# Patient Record
Sex: MALE | HISPANIC OR LATINO | Employment: FULL TIME | URBAN - METROPOLITAN AREA
[De-identification: names, ages, dates, MRNs, and addresses within clinical notes are randomized per-mention and may not be internally consistent; named-entity substitution may affect disease eponyms.]

---

## 2019-01-23 ENCOUNTER — HOSPITAL ENCOUNTER (INPATIENT)
Facility: MEDICAL CENTER | Age: 29
LOS: 2 days | DRG: 247 | End: 2019-01-26
Attending: EMERGENCY MEDICINE | Admitting: INTERNAL MEDICINE
Payer: COMMERCIAL

## 2019-01-23 ENCOUNTER — APPOINTMENT (OUTPATIENT)
Dept: RADIOLOGY | Facility: MEDICAL CENTER | Age: 29
DRG: 247 | End: 2019-01-23
Attending: EMERGENCY MEDICINE
Payer: COMMERCIAL

## 2019-01-23 ENCOUNTER — APPOINTMENT (OUTPATIENT)
Dept: RADIOLOGY | Facility: MEDICAL CENTER | Age: 29
DRG: 247 | End: 2019-01-23
Payer: COMMERCIAL

## 2019-01-23 DIAGNOSIS — R07.9 CHEST PAIN, UNSPECIFIED TYPE: ICD-10-CM

## 2019-01-23 LAB
ALBUMIN SERPL BCP-MCNC: 5.1 G/DL (ref 3.2–4.9)
ALBUMIN/GLOB SERPL: 1.7 G/DL
ALP SERPL-CCNC: 77 U/L (ref 30–99)
ALT SERPL-CCNC: 34 U/L (ref 2–50)
ANION GAP SERPL CALC-SCNC: 12 MMOL/L (ref 0–11.9)
APTT PPP: 31.3 SEC (ref 24.7–36)
AST SERPL-CCNC: 78 U/L (ref 12–45)
BASOPHILS # BLD AUTO: 0.4 % (ref 0–1.8)
BASOPHILS # BLD: 0.04 K/UL (ref 0–0.12)
BILIRUB SERPL-MCNC: 0.5 MG/DL (ref 0.1–1.5)
BNP SERPL-MCNC: 22 PG/ML (ref 0–100)
BUN SERPL-MCNC: 24 MG/DL (ref 8–22)
CALCIUM SERPL-MCNC: 9.7 MG/DL (ref 8.5–10.5)
CHLORIDE SERPL-SCNC: 101 MMOL/L (ref 96–112)
CO2 SERPL-SCNC: 24 MMOL/L (ref 20–33)
CREAT SERPL-MCNC: 1.29 MG/DL (ref 0.5–1.4)
EOSINOPHIL # BLD AUTO: 0.09 K/UL (ref 0–0.51)
EOSINOPHIL NFR BLD: 0.8 % (ref 0–6.9)
ERYTHROCYTE [DISTWIDTH] IN BLOOD BY AUTOMATED COUNT: 38.7 FL (ref 35.9–50)
GLOBULIN SER CALC-MCNC: 3 G/DL (ref 1.9–3.5)
GLUCOSE SERPL-MCNC: 94 MG/DL (ref 65–99)
HCT VFR BLD AUTO: 50.6 % (ref 42–52)
HGB BLD-MCNC: 17.4 G/DL (ref 14–18)
IMM GRANULOCYTES # BLD AUTO: 0.02 K/UL (ref 0–0.11)
IMM GRANULOCYTES NFR BLD AUTO: 0.2 % (ref 0–0.9)
INR PPP: 1.05 (ref 0.87–1.13)
LIPASE SERPL-CCNC: 32 U/L (ref 11–82)
LYMPHOCYTES # BLD AUTO: 1.4 K/UL (ref 1–4.8)
LYMPHOCYTES NFR BLD: 12.7 % (ref 22–41)
MCH RBC QN AUTO: 30.6 PG (ref 27–33)
MCHC RBC AUTO-ENTMCNC: 34.4 G/DL (ref 33.7–35.3)
MCV RBC AUTO: 89.1 FL (ref 81.4–97.8)
MONOCYTES # BLD AUTO: 0.61 K/UL (ref 0–0.85)
MONOCYTES NFR BLD AUTO: 5.5 % (ref 0–13.4)
NEUTROPHILS # BLD AUTO: 8.85 K/UL (ref 1.82–7.42)
NEUTROPHILS NFR BLD: 80.4 % (ref 44–72)
NRBC # BLD AUTO: 0 K/UL
NRBC BLD-RTO: 0 /100 WBC
PLATELET # BLD AUTO: 258 K/UL (ref 164–446)
PMV BLD AUTO: 9.5 FL (ref 9–12.9)
POTASSIUM SERPL-SCNC: 3.8 MMOL/L (ref 3.6–5.5)
PROT SERPL-MCNC: 8.1 G/DL (ref 6–8.2)
PROTHROMBIN TIME: 13.8 SEC (ref 12–14.6)
RBC # BLD AUTO: 5.68 M/UL (ref 4.7–6.1)
SODIUM SERPL-SCNC: 137 MMOL/L (ref 135–145)
TROPONIN I SERPL-MCNC: 6.87 NG/ML (ref 0–0.04)
WBC # BLD AUTO: 11 K/UL (ref 4.8–10.8)

## 2019-01-23 PROCEDURE — 700111 HCHG RX REV CODE 636 W/ 250 OVERRIDE (IP): Performed by: EMERGENCY MEDICINE

## 2019-01-23 PROCEDURE — 93005 ELECTROCARDIOGRAM TRACING: CPT

## 2019-01-23 PROCEDURE — 99291 CRITICAL CARE FIRST HOUR: CPT

## 2019-01-23 PROCEDURE — 83880 ASSAY OF NATRIURETIC PEPTIDE: CPT

## 2019-01-23 PROCEDURE — 84100 ASSAY OF PHOSPHORUS: CPT

## 2019-01-23 PROCEDURE — 93005 ELECTROCARDIOGRAM TRACING: CPT | Performed by: EMERGENCY MEDICINE

## 2019-01-23 PROCEDURE — 85025 COMPLETE CBC W/AUTO DIFF WBC: CPT

## 2019-01-23 PROCEDURE — 71045 X-RAY EXAM CHEST 1 VIEW: CPT

## 2019-01-23 PROCEDURE — 83735 ASSAY OF MAGNESIUM: CPT

## 2019-01-23 PROCEDURE — 96374 THER/PROPH/DIAG INJ IV PUSH: CPT

## 2019-01-23 PROCEDURE — 85610 PROTHROMBIN TIME: CPT

## 2019-01-23 PROCEDURE — 80053 COMPREHEN METABOLIC PANEL: CPT

## 2019-01-23 PROCEDURE — 85730 THROMBOPLASTIN TIME PARTIAL: CPT

## 2019-01-23 PROCEDURE — 84484 ASSAY OF TROPONIN QUANT: CPT | Mod: 91

## 2019-01-23 PROCEDURE — 96375 TX/PRO/DX INJ NEW DRUG ADDON: CPT

## 2019-01-23 PROCEDURE — 83690 ASSAY OF LIPASE: CPT

## 2019-01-23 RX ORDER — COLCHICINE 0.6 MG/1
1.2 TABLET ORAL DAILY
Status: DISCONTINUED | OUTPATIENT
Start: 2019-01-24 | End: 2019-01-23

## 2019-01-23 RX ORDER — MORPHINE SULFATE 4 MG/ML
4 INJECTION, SOLUTION INTRAMUSCULAR; INTRAVENOUS ONCE
Status: COMPLETED | OUTPATIENT
Start: 2019-01-24 | End: 2019-01-23

## 2019-01-23 RX ORDER — ONDANSETRON 2 MG/ML
4 INJECTION INTRAMUSCULAR; INTRAVENOUS ONCE
Status: COMPLETED | OUTPATIENT
Start: 2019-01-24 | End: 2019-01-23

## 2019-01-23 RX ADMIN — ONDANSETRON 4 MG: 2 INJECTION INTRAMUSCULAR; INTRAVENOUS at 23:53

## 2019-01-23 RX ADMIN — MORPHINE SULFATE 4 MG: 4 INJECTION INTRAVENOUS at 23:53

## 2019-01-24 ENCOUNTER — APPOINTMENT (OUTPATIENT)
Dept: CARDIOLOGY | Facility: MEDICAL CENTER | Age: 29
DRG: 247 | End: 2019-01-24
Attending: INTERNAL MEDICINE
Payer: COMMERCIAL

## 2019-01-24 PROBLEM — N17.9 AKI (ACUTE KIDNEY INJURY) (HCC): Status: RESOLVED | Noted: 2019-01-24 | Resolved: 2019-01-24

## 2019-01-24 PROBLEM — R07.9 CHEST PAIN RADIATING TO JAW: Status: ACTIVE | Noted: 2019-01-24

## 2019-01-24 PROBLEM — R74.01 ELEVATED AST (SGOT): Status: ACTIVE | Noted: 2019-01-24

## 2019-01-24 PROBLEM — R79.89 ELEVATED TROPONIN: Status: ACTIVE | Noted: 2019-01-24

## 2019-01-24 PROBLEM — N17.9 AKI (ACUTE KIDNEY INJURY) (HCC): Status: ACTIVE | Noted: 2019-01-24

## 2019-01-24 LAB
ALBUMIN SERPL BCP-MCNC: 4.6 G/DL (ref 3.2–4.9)
ALBUMIN/GLOB SERPL: 1.8 G/DL
ALP SERPL-CCNC: 74 U/L (ref 30–99)
ALT SERPL-CCNC: 42 U/L (ref 2–50)
AMPHET UR QL SCN: NEGATIVE
ANION GAP SERPL CALC-SCNC: 9 MMOL/L (ref 0–11.9)
AST SERPL-CCNC: 144 U/L (ref 12–45)
BARBITURATES UR QL SCN: NEGATIVE
BASOPHILS # BLD AUTO: 0.4 % (ref 0–1.8)
BASOPHILS # BLD: 0.04 K/UL (ref 0–0.12)
BENZODIAZ UR QL SCN: NEGATIVE
BILIRUB SERPL-MCNC: 0.7 MG/DL (ref 0.1–1.5)
BUN SERPL-MCNC: 22 MG/DL (ref 8–22)
BZE UR QL SCN: NEGATIVE
CALCIUM SERPL-MCNC: 9.5 MG/DL (ref 8.5–10.5)
CANNABINOIDS UR QL SCN: POSITIVE
CHLORIDE SERPL-SCNC: 104 MMOL/L (ref 96–112)
CHOLEST SERPL-MCNC: 174 MG/DL (ref 100–199)
CO2 SERPL-SCNC: 25 MMOL/L (ref 20–33)
CREAT SERPL-MCNC: 1.09 MG/DL (ref 0.5–1.4)
CRP SERPL HS-MCNC: 0.68 MG/DL (ref 0–0.75)
CRP SERPL HS-MCNC: 6.8 MG/L (ref 0–7.5)
EKG IMPRESSION: NORMAL
EOSINOPHIL # BLD AUTO: 0.2 K/UL (ref 0–0.51)
EOSINOPHIL NFR BLD: 1.8 % (ref 0–6.9)
ERYTHROCYTE [DISTWIDTH] IN BLOOD BY AUTOMATED COUNT: 39 FL (ref 35.9–50)
ERYTHROCYTE [SEDIMENTATION RATE] IN BLOOD BY WESTERGREN METHOD: 7 MM/HOUR (ref 0–15)
EST. AVERAGE GLUCOSE BLD GHB EST-MCNC: 111 MG/DL
GLOBULIN SER CALC-MCNC: 2.6 G/DL (ref 1.9–3.5)
GLUCOSE SERPL-MCNC: 93 MG/DL (ref 65–99)
HBA1C MFR BLD: 5.5 % (ref 0–5.6)
HCT VFR BLD AUTO: 48 % (ref 42–52)
HDLC SERPL-MCNC: 33 MG/DL
HGB BLD-MCNC: 16.7 G/DL (ref 14–18)
IMM GRANULOCYTES # BLD AUTO: 0.03 K/UL (ref 0–0.11)
IMM GRANULOCYTES NFR BLD AUTO: 0.3 % (ref 0–0.9)
LDLC SERPL CALC-MCNC: 110 MG/DL
LV EJECT FRACT  99904: 45
LV EJECT FRACT MOD 2C 99903: 62.72
LV EJECT FRACT MOD 4C 99902: 55.7
LV EJECT FRACT MOD BP 99901: 55.34
LYMPHOCYTES # BLD AUTO: 2.18 K/UL (ref 1–4.8)
LYMPHOCYTES NFR BLD: 19.9 % (ref 22–41)
MAGNESIUM SERPL-MCNC: 1.9 MG/DL (ref 1.5–2.5)
MAGNESIUM SERPL-MCNC: 2 MG/DL (ref 1.5–2.5)
MCH RBC QN AUTO: 30.8 PG (ref 27–33)
MCHC RBC AUTO-ENTMCNC: 34.8 G/DL (ref 33.7–35.3)
MCV RBC AUTO: 88.6 FL (ref 81.4–97.8)
METHADONE UR QL SCN: NEGATIVE
MONOCYTES # BLD AUTO: 0.95 K/UL (ref 0–0.85)
MONOCYTES NFR BLD AUTO: 8.7 % (ref 0–13.4)
NEUTROPHILS # BLD AUTO: 7.58 K/UL (ref 1.82–7.42)
NEUTROPHILS NFR BLD: 68.9 % (ref 44–72)
NRBC # BLD AUTO: 0 K/UL
NRBC BLD-RTO: 0 /100 WBC
OPIATES UR QL SCN: POSITIVE
OXYCODONE UR QL SCN: NEGATIVE
PCP UR QL SCN: NEGATIVE
PHOSPHATE SERPL-MCNC: 4.4 MG/DL (ref 2.5–4.5)
PLATELET # BLD AUTO: 228 K/UL (ref 164–446)
PMV BLD AUTO: 9.8 FL (ref 9–12.9)
POTASSIUM SERPL-SCNC: 3.9 MMOL/L (ref 3.6–5.5)
PROPOXYPH UR QL SCN: NEGATIVE
PROT SERPL-MCNC: 7.2 G/DL (ref 6–8.2)
RBC # BLD AUTO: 5.42 M/UL (ref 4.7–6.1)
SODIUM SERPL-SCNC: 138 MMOL/L (ref 135–145)
TRIGL SERPL-MCNC: 153 MG/DL (ref 0–149)
TROPONIN I SERPL-MCNC: 16.59 NG/ML (ref 0–0.04)
TROPONIN I SERPL-MCNC: 55.06 NG/ML (ref 0–0.04)
TROPONIN I SERPL-MCNC: 69.19 NG/ML (ref 0–0.04)
TROPONIN I SERPL-MCNC: 73.93 NG/ML (ref 0–0.04)
TSH SERPL DL<=0.005 MIU/L-ACNC: 3.86 UIU/ML (ref 0.38–5.33)
WBC # BLD AUTO: 11 K/UL (ref 4.8–10.8)

## 2019-01-24 PROCEDURE — 83735 ASSAY OF MAGNESIUM: CPT

## 2019-01-24 PROCEDURE — 92928 PRQ TCAT PLMT NTRAC ST 1 LES: CPT | Mod: LD | Performed by: INTERNAL MEDICINE

## 2019-01-24 PROCEDURE — 84443 ASSAY THYROID STIM HORMONE: CPT

## 2019-01-24 PROCEDURE — A9270 NON-COVERED ITEM OR SERVICE: HCPCS | Performed by: INTERNAL MEDICINE

## 2019-01-24 PROCEDURE — C1757 CATH, THROMBECTOMY/EMBOLECT: HCPCS

## 2019-01-24 PROCEDURE — C1769 GUIDE WIRE: HCPCS

## 2019-01-24 PROCEDURE — 700105 HCHG RX REV CODE 258: Performed by: INTERNAL MEDICINE

## 2019-01-24 PROCEDURE — C1874 STENT, COATED/COV W/DEL SYS: HCPCS

## 2019-01-24 PROCEDURE — A9270 NON-COVERED ITEM OR SERVICE: HCPCS | Performed by: STUDENT IN AN ORGANIZED HEALTH CARE EDUCATION/TRAINING PROGRAM

## 2019-01-24 PROCEDURE — 360979 HCHG DIAGNOSTIC CATH

## 2019-01-24 PROCEDURE — 99152 MOD SED SAME PHYS/QHP 5/>YRS: CPT

## 2019-01-24 PROCEDURE — C1894 INTRO/SHEATH, NON-LASER: HCPCS

## 2019-01-24 PROCEDURE — 700111 HCHG RX REV CODE 636 W/ 250 OVERRIDE (IP)

## 2019-01-24 PROCEDURE — 93306 TTE W/DOPPLER COMPLETE: CPT

## 2019-01-24 PROCEDURE — 93005 ELECTROCARDIOGRAM TRACING: CPT | Performed by: INTERNAL MEDICINE

## 2019-01-24 PROCEDURE — 700117 HCHG RX CONTRAST REV CODE 255: Performed by: INTERNAL MEDICINE

## 2019-01-24 PROCEDURE — 02C03ZZ EXTIRPATION OF MATTER FROM CORONARY ARTERY, ONE ARTERY, PERCUTANEOUS APPROACH: ICD-10-PCS | Performed by: INTERNAL MEDICINE

## 2019-01-24 PROCEDURE — 93010 ELECTROCARDIOGRAM REPORT: CPT | Performed by: INTERNAL MEDICINE

## 2019-01-24 PROCEDURE — 02703DZ DILATION OF CORONARY ARTERY, ONE ARTERY WITH INTRALUMINAL DEVICE, PERCUTANEOUS APPROACH: ICD-10-PCS | Performed by: INTERNAL MEDICINE

## 2019-01-24 PROCEDURE — 027034Z DILATION OF CORONARY ARTERY, ONE ARTERY WITH DRUG-ELUTING INTRALUMINAL DEVICE, PERCUTANEOUS APPROACH: ICD-10-PCS | Performed by: INTERNAL MEDICINE

## 2019-01-24 PROCEDURE — 93454 CORONARY ARTERY ANGIO S&I: CPT

## 2019-01-24 PROCEDURE — C1725 CATH, TRANSLUMIN NON-LASER: HCPCS

## 2019-01-24 PROCEDURE — 93454 CORONARY ARTERY ANGIO S&I: CPT | Mod: 26,59 | Performed by: INTERNAL MEDICINE

## 2019-01-24 PROCEDURE — 700102 HCHG RX REV CODE 250 W/ 637 OVERRIDE(OP): Performed by: STUDENT IN AN ORGANIZED HEALTH CARE EDUCATION/TRAINING PROGRAM

## 2019-01-24 PROCEDURE — 83036 HEMOGLOBIN GLYCOSYLATED A1C: CPT

## 2019-01-24 PROCEDURE — 700101 HCHG RX REV CODE 250

## 2019-01-24 PROCEDURE — 80061 LIPID PANEL: CPT

## 2019-01-24 PROCEDURE — 86141 C-REACTIVE PROTEIN HS: CPT

## 2019-01-24 PROCEDURE — 99223 1ST HOSP IP/OBS HIGH 75: CPT | Performed by: INTERNAL MEDICINE

## 2019-01-24 PROCEDURE — 93306 TTE W/DOPPLER COMPLETE: CPT | Mod: 26 | Performed by: INTERNAL MEDICINE

## 2019-01-24 PROCEDURE — 80053 COMPREHEN METABOLIC PANEL: CPT

## 2019-01-24 PROCEDURE — 86140 C-REACTIVE PROTEIN: CPT

## 2019-01-24 PROCEDURE — 93005 ELECTROCARDIOGRAM TRACING: CPT | Performed by: STUDENT IN AN ORGANIZED HEALTH CARE EDUCATION/TRAINING PROGRAM

## 2019-01-24 PROCEDURE — 770020 HCHG ROOM/CARE - TELE (206)

## 2019-01-24 PROCEDURE — 85025 COMPLETE CBC W/AUTO DIFF WBC: CPT

## 2019-01-24 PROCEDURE — B2111ZZ FLUOROSCOPY OF MULTIPLE CORONARY ARTERIES USING LOW OSMOLAR CONTRAST: ICD-10-PCS | Performed by: INTERNAL MEDICINE

## 2019-01-24 PROCEDURE — 80307 DRUG TEST PRSMV CHEM ANLYZR: CPT

## 2019-01-24 PROCEDURE — A9270 NON-COVERED ITEM OR SERVICE: HCPCS

## 2019-01-24 PROCEDURE — 700111 HCHG RX REV CODE 636 W/ 250 OVERRIDE (IP): Performed by: INTERNAL MEDICINE

## 2019-01-24 PROCEDURE — 99152 MOD SED SAME PHYS/QHP 5/>YRS: CPT | Performed by: INTERNAL MEDICINE

## 2019-01-24 PROCEDURE — C9602 PERC D-E COR STENT ATHER S: HCPCS | Mod: LD

## 2019-01-24 PROCEDURE — 99255 IP/OBS CONSLTJ NEW/EST HI 80: CPT | Performed by: INTERNAL MEDICINE

## 2019-01-24 PROCEDURE — 700102 HCHG RX REV CODE 250 W/ 637 OVERRIDE(OP): Performed by: INTERNAL MEDICINE

## 2019-01-24 PROCEDURE — C1760 CLOSURE DEV, VASC: HCPCS

## 2019-01-24 PROCEDURE — 700102 HCHG RX REV CODE 250 W/ 637 OVERRIDE(OP)

## 2019-01-24 PROCEDURE — 85652 RBC SED RATE AUTOMATED: CPT

## 2019-01-24 PROCEDURE — C1887 CATHETER, GUIDING: HCPCS

## 2019-01-24 PROCEDURE — 700105 HCHG RX REV CODE 258: Performed by: STUDENT IN AN ORGANIZED HEALTH CARE EDUCATION/TRAINING PROGRAM

## 2019-01-24 PROCEDURE — 84484 ASSAY OF TROPONIN QUANT: CPT | Mod: 91

## 2019-01-24 PROCEDURE — 304952 HCHG R 2 PADS

## 2019-01-24 PROCEDURE — 99153 MOD SED SAME PHYS/QHP EA: CPT

## 2019-01-24 PROCEDURE — 36415 COLL VENOUS BLD VENIPUNCTURE: CPT

## 2019-01-24 PROCEDURE — C9600 PERC DRUG-EL COR STENT SING: HCPCS | Mod: LD

## 2019-01-24 RX ORDER — NITROGLYCERIN 0.4 MG/1
0.4 TABLET SUBLINGUAL
Status: DISCONTINUED | OUTPATIENT
Start: 2019-01-24 | End: 2019-01-26 | Stop reason: HOSPADM

## 2019-01-24 RX ORDER — BIVALIRUDIN 250 MG/5ML
INJECTION, POWDER, LYOPHILIZED, FOR SOLUTION INTRAVENOUS
Status: COMPLETED
Start: 2019-01-24 | End: 2019-01-24

## 2019-01-24 RX ORDER — ACETAMINOPHEN 325 MG/1
650 TABLET ORAL EVERY 6 HOURS PRN
Status: DISCONTINUED | OUTPATIENT
Start: 2019-01-24 | End: 2019-01-24

## 2019-01-24 RX ORDER — MIDAZOLAM HYDROCHLORIDE 1 MG/ML
INJECTION INTRAMUSCULAR; INTRAVENOUS
Status: COMPLETED
Start: 2019-01-24 | End: 2019-01-24

## 2019-01-24 RX ORDER — ASPIRIN 325 MG
325 TABLET ORAL DAILY
Status: DISCONTINUED | OUTPATIENT
Start: 2019-01-24 | End: 2019-01-24

## 2019-01-24 RX ORDER — HEPARIN SODIUM,PORCINE 1000/ML
VIAL (ML) INJECTION
Status: COMPLETED
Start: 2019-01-24 | End: 2019-01-24

## 2019-01-24 RX ORDER — VERAPAMIL HYDROCHLORIDE 2.5 MG/ML
INJECTION, SOLUTION INTRAVENOUS
Status: COMPLETED
Start: 2019-01-24 | End: 2019-01-24

## 2019-01-24 RX ORDER — COLCHICINE 0.6 MG/1
0.6 TABLET ORAL 2 TIMES DAILY
Status: DISCONTINUED | OUTPATIENT
Start: 2019-01-24 | End: 2019-01-25

## 2019-01-24 RX ORDER — ATORVASTATIN CALCIUM 40 MG/1
40 TABLET, FILM COATED ORAL
Status: DISCONTINUED | OUTPATIENT
Start: 2019-01-24 | End: 2019-01-24

## 2019-01-24 RX ORDER — CARVEDILOL 3.12 MG/1
3.12 TABLET ORAL 2 TIMES DAILY WITH MEALS
Status: DISCONTINUED | OUTPATIENT
Start: 2019-01-24 | End: 2019-01-24

## 2019-01-24 RX ORDER — LIDOCAINE HYDROCHLORIDE 20 MG/ML
INJECTION, SOLUTION INFILTRATION; PERINEURAL
Status: COMPLETED
Start: 2019-01-24 | End: 2019-01-24

## 2019-01-24 RX ORDER — ACETAMINOPHEN 325 MG/1
650 TABLET ORAL EVERY 6 HOURS PRN
Status: DISCONTINUED | OUTPATIENT
Start: 2019-01-24 | End: 2019-01-26 | Stop reason: HOSPADM

## 2019-01-24 RX ORDER — AMOXICILLIN 250 MG
2 CAPSULE ORAL 2 TIMES DAILY
Status: DISCONTINUED | OUTPATIENT
Start: 2019-01-24 | End: 2019-01-26 | Stop reason: HOSPADM

## 2019-01-24 RX ORDER — SODIUM CHLORIDE 9 MG/ML
INJECTION, SOLUTION INTRAVENOUS CONTINUOUS
Status: DISPENSED | OUTPATIENT
Start: 2019-01-24 | End: 2019-01-25

## 2019-01-24 RX ORDER — ASPIRIN 325 MG
325 TABLET ORAL ONCE
Status: COMPLETED | OUTPATIENT
Start: 2019-01-24 | End: 2019-01-24

## 2019-01-24 RX ORDER — ATORVASTATIN CALCIUM 80 MG/1
80 TABLET, FILM COATED ORAL EVERY EVENING
Status: DISCONTINUED | OUTPATIENT
Start: 2019-01-24 | End: 2019-01-26 | Stop reason: HOSPADM

## 2019-01-24 RX ORDER — SODIUM CHLORIDE 9 MG/ML
INJECTION, SOLUTION INTRAVENOUS CONTINUOUS
Status: DISCONTINUED | OUTPATIENT
Start: 2019-01-24 | End: 2019-01-25

## 2019-01-24 RX ORDER — BISACODYL 10 MG
10 SUPPOSITORY, RECTAL RECTAL
Status: DISCONTINUED | OUTPATIENT
Start: 2019-01-24 | End: 2019-01-26 | Stop reason: HOSPADM

## 2019-01-24 RX ORDER — POLYETHYLENE GLYCOL 3350 17 G/17G
1 POWDER, FOR SOLUTION ORAL
Status: DISCONTINUED | OUTPATIENT
Start: 2019-01-24 | End: 2019-01-26 | Stop reason: HOSPADM

## 2019-01-24 RX ADMIN — BIVALIRUDIN IN 0.9% SODIUM CHLORIDE INJECTION 0.2 MG/KG/HR: 250 INJECTION INTRAVENOUS at 18:30

## 2019-01-24 RX ADMIN — HEPARIN SODIUM: 200 INJECTION, SOLUTION INTRAVENOUS at 15:45

## 2019-01-24 RX ADMIN — LIDOCAINE HYDROCHLORIDE: 20 INJECTION, SOLUTION INFILTRATION; PERINEURAL at 16:59

## 2019-01-24 RX ADMIN — FENTANYL CITRATE 100 MCG: 50 INJECTION, SOLUTION INTRAMUSCULAR; INTRAVENOUS at 17:45

## 2019-01-24 RX ADMIN — TICAGRELOR 180 MG: 90 TABLET ORAL at 18:04

## 2019-01-24 RX ADMIN — ASPIRIN 325 MG: 325 TABLET ORAL at 03:12

## 2019-01-24 RX ADMIN — SODIUM CHLORIDE: 9 INJECTION, SOLUTION INTRAVENOUS at 21:30

## 2019-01-24 RX ADMIN — COLCHICINE 0.6 MG: 0.6 TABLET, FILM COATED ORAL at 06:58

## 2019-01-24 RX ADMIN — MIDAZOLAM HYDROCHLORIDE 2 MG: 1 INJECTION, SOLUTION INTRAMUSCULAR; INTRAVENOUS at 17:45

## 2019-01-24 RX ADMIN — SODIUM CHLORIDE: 9 INJECTION, SOLUTION INTRAVENOUS at 04:00

## 2019-01-24 RX ADMIN — ASPIRIN 81 MG: 81 TABLET, COATED ORAL at 12:59

## 2019-01-24 RX ADMIN — MIDAZOLAM HYDROCHLORIDE 2 MG: 1 INJECTION, SOLUTION INTRAMUSCULAR; INTRAVENOUS at 17:07

## 2019-01-24 RX ADMIN — ATORVASTATIN CALCIUM 80 MG: 80 TABLET, FILM COATED ORAL at 18:47

## 2019-01-24 RX ADMIN — NITROGLYCERIN 0.4 MG: 0.4 TABLET SUBLINGUAL at 00:51

## 2019-01-24 RX ADMIN — VERAPAMIL HYDROCHLORIDE 2.5 MG: 2.5 INJECTION, SOLUTION INTRAVENOUS at 17:00

## 2019-01-24 RX ADMIN — ACETAMINOPHEN 650 MG: 325 TABLET, FILM COATED ORAL at 04:10

## 2019-01-24 RX ADMIN — HEPARIN SODIUM: 1000 INJECTION, SOLUTION INTRAVENOUS; SUBCUTANEOUS at 16:59

## 2019-01-24 RX ADMIN — NITROGLYCERIN 10 ML: 20 INJECTION INTRAVENOUS at 17:00

## 2019-01-24 RX ADMIN — IOHEXOL 237 ML: 350 INJECTION, SOLUTION INTRAVENOUS at 18:17

## 2019-01-24 RX ADMIN — COLCHICINE 0.6 MG: 0.6 TABLET, FILM COATED ORAL at 21:52

## 2019-01-24 RX ADMIN — FENTANYL CITRATE 100 MCG: 50 INJECTION, SOLUTION INTRAMUSCULAR; INTRAVENOUS at 17:07

## 2019-01-24 RX ADMIN — BIVALIRUDIN 250 MG: 250 INJECTION, POWDER, LYOPHILIZED, FOR SOLUTION INTRAVENOUS at 17:46

## 2019-01-24 RX ADMIN — MIDAZOLAM HYDROCHLORIDE 0.5 MG: 1 INJECTION, SOLUTION INTRAMUSCULAR; INTRAVENOUS at 18:18

## 2019-01-24 ASSESSMENT — LIFESTYLE VARIABLES
HAVE PEOPLE ANNOYED YOU BY CRITICIZING YOUR DRINKING: NO
ON A TYPICAL DAY WHEN YOU DRINK ALCOHOL HOW MANY DRINKS DO YOU HAVE: 3
CONSUMPTION TOTAL: POSITIVE
TOTAL SCORE: 0
TOTAL SCORE: 0
EVER FELT BAD OR GUILTY ABOUT YOUR DRINKING: NO
TOTAL SCORE: 0
HOW MANY TIMES IN THE PAST YEAR HAVE YOU HAD 5 OR MORE DRINKS IN A DAY: 2
ALCOHOL_USE: YES
EVER_SMOKED: NEVER
HAVE YOU EVER FELT YOU SHOULD CUT DOWN ON YOUR DRINKING: NO
AVERAGE NUMBER OF DAYS PER WEEK YOU HAVE A DRINK CONTAINING ALCOHOL: 1
EVER HAD A DRINK FIRST THING IN THE MORNING TO STEADY YOUR NERVES TO GET RID OF A HANGOVER: NO

## 2019-01-24 ASSESSMENT — COGNITIVE AND FUNCTIONAL STATUS - GENERAL
DAILY ACTIVITIY SCORE: 24
MOBILITY SCORE: 24
SUGGESTED CMS G CODE MODIFIER DAILY ACTIVITY: CH
SUGGESTED CMS G CODE MODIFIER MOBILITY: CH

## 2019-01-24 ASSESSMENT — ENCOUNTER SYMPTOMS
ABDOMINAL PAIN: 0
CHILLS: 0
FEVER: 0
VOMITING: 0
GASTROINTESTINAL NEGATIVE: 1
NECK PAIN: 0
PHOTOPHOBIA: 0
SORE THROAT: 0
CLAUDICATION: 0
DIZZINESS: 0
PND: 0
EYE PAIN: 0
ORTHOPNEA: 0
HEMOPTYSIS: 0
WEIGHT LOSS: 0
MUSCULOSKELETAL NEGATIVE: 1
RESPIRATORY NEGATIVE: 1
WEAKNESS: 0
SPUTUM PRODUCTION: 0
HEARTBURN: 1
WHEEZING: 0
POLYDIPSIA: 0
TREMORS: 0
PALPITATIONS: 0
SPEECH CHANGE: 0
HEADACHES: 0
SHORTNESS OF BREATH: 0
PSYCHIATRIC NEGATIVE: 1
BLURRED VISION: 0
COUGH: 0
MYALGIAS: 0
NAUSEA: 0
FOCAL WEAKNESS: 0
BACK PAIN: 0
CONSTIPATION: 0
TINGLING: 0
BLOOD IN STOOL: 0
DOUBLE VISION: 0
SENSORY CHANGE: 0
NEUROLOGICAL NEGATIVE: 1
CONSTITUTIONAL NEGATIVE: 1
DIARRHEA: 0
EYES NEGATIVE: 1
DIAPHORESIS: 0
FLANK PAIN: 0

## 2019-01-24 ASSESSMENT — PATIENT HEALTH QUESTIONNAIRE - PHQ9
2. FEELING DOWN, DEPRESSED, IRRITABLE, OR HOPELESS: NOT AT ALL
1. LITTLE INTEREST OR PLEASURE IN DOING THINGS: NOT AT ALL
SUM OF ALL RESPONSES TO PHQ9 QUESTIONS 1 AND 2: 0

## 2019-01-24 NOTE — ED PROVIDER NOTES
"ED Provider Note    ED Provider Note      Primary care provider: Pcp Pt States None    CHIEF COMPLAINT  Chief Complaint   Patient presents with   • Chest Pain   • Jaw Pain   • Body Aches       HPI  Zeeshan Hewitt is a 28 y.o. male who presents to the Emergency Department with chief complaint of chest pain.  Patient's been experiencing the chest pain 2 days.    States that he initially experienced it after playing basketball.  States he played basketball approximately 2 hours with no pain but then had burning type substernal chest pain that radiated into bilateral neck after the pain after the basketball yesterday.  Patient stated that the pain resolved on its own however he went for an approximately 3 mile run today again had no pain with exertion but had post exertional chest pain.  Patient states recent history of hand-foot-and-mouth/coxsackie infection and states he had similar symptoms with that presentation.  He said no shortness of breath he reports minor nausea no diaphoresis he has had no fevers or chills.  No abdominal pain.  No urinary complaints no changes in stool no skin changes no other acute symptoms family history of early coronary artery disease.      REVIEW OF SYSTEMS  10 systems reviewed and otherwise negative, pertinent positives and negatives listed in the history of present illness.    PAST MEDICAL HISTORY   Patient denies    SURGICAL HISTORY   has a past surgical history that includes radius ulna orif (3/17/2014).    SOCIAL HISTORY  Social History   Substance Use Topics   • Smoking status: Never Smoker   • Smokeless tobacco: Never Used   • Alcohol use Yes      Comment: occasionally      History   Drug Use     Comment: weed       FAMILY HISTORY  Early coronary artery disease    CURRENT MEDICATIONS  None  ALLERGIES  No Known Allergies    PHYSICAL EXAM  VITAL SIGNS: /103   Pulse 78   Temp 36.6 °C (97.9 °F) (Temporal)   Resp 17   Ht 1.778 m (5' 10\")   Wt 86.2 kg (190 lb)   SpO2 " 95%   BMI 27.26 kg/m²   Pulse ox interpretation: I interpret this pulse ox as normal.  Constitutional: Alert and oriented x 3, minimal distress  HEENT: Atraumatic normocephalic, pupils are equal round reactive to light extraocular movements are intact. The nares is clear, external ears are normal, mouth shows moist mucous membranes  Neck: Supple, no JVD no tracheal deviation  Cardiovascular: Regular rate and rhythm no murmur rub or gallop 2+ pulses peripherally x4  Thorax & Lungs: No respiratory distress, no wheezes rales or rhonchi, No chest tenderness.   GI: Soft nontender nondistended positive bowel sounds, no peritoneal signs  Skin: Warm dry no acute rash or lesion  Musculoskeletal: Moving all extremities with full range and 5 of 5 strength, no acute  deformity  Neurologic: Cranial nerves III through XII are grossly intact, no sensory deficit, no cerebellar dysfunction   Psychiatric: Appropriate affect for situation at this time      DIAGNOSTIC STUDIES / PROCEDURES  LABS      Results for orders placed or performed during the hospital encounter of 01/23/19   Troponin   Result Value Ref Range    Troponin I 6.87 (H) 0.00 - 0.04 ng/mL   Btype Natriuretic Peptide   Result Value Ref Range    B Natriuretic Peptide 22 0 - 100 pg/mL   CBC with Differential   Result Value Ref Range    WBC 11.0 (H) 4.8 - 10.8 K/uL    RBC 5.68 4.70 - 6.10 M/uL    Hemoglobin 17.4 14.0 - 18.0 g/dL    Hematocrit 50.6 42.0 - 52.0 %    MCV 89.1 81.4 - 97.8 fL    MCH 30.6 27.0 - 33.0 pg    MCHC 34.4 33.7 - 35.3 g/dL    RDW 38.7 35.9 - 50.0 fL    Platelet Count 258 164 - 446 K/uL    MPV 9.5 9.0 - 12.9 fL    Neutrophils-Polys 80.40 (H) 44.00 - 72.00 %    Lymphocytes 12.70 (L) 22.00 - 41.00 %    Monocytes 5.50 0.00 - 13.40 %    Eosinophils 0.80 0.00 - 6.90 %    Basophils 0.40 0.00 - 1.80 %    Immature Granulocytes 0.20 0.00 - 0.90 %    Nucleated RBC 0.00 /100 WBC    Neutrophils (Absolute) 8.85 (H) 1.82 - 7.42 K/uL    Lymphs (Absolute) 1.40 1.00 -  4.80 K/uL    Monos (Absolute) 0.61 0.00 - 0.85 K/uL    Eos (Absolute) 0.09 0.00 - 0.51 K/uL    Baso (Absolute) 0.04 0.00 - 0.12 K/uL    Immature Granulocytes (abs) 0.02 0.00 - 0.11 K/uL    NRBC (Absolute) 0.00 K/uL   Complete Metabolic Panel (CMP)   Result Value Ref Range    Sodium 137 135 - 145 mmol/L    Potassium 3.8 3.6 - 5.5 mmol/L    Chloride 101 96 - 112 mmol/L    Co2 24 20 - 33 mmol/L    Anion Gap 12.0 (H) 0.0 - 11.9    Glucose 94 65 - 99 mg/dL    Bun 24 (H) 8 - 22 mg/dL    Creatinine 1.29 0.50 - 1.40 mg/dL    Calcium 9.7 8.5 - 10.5 mg/dL    AST(SGOT) 78 (H) 12 - 45 U/L    ALT(SGPT) 34 2 - 50 U/L    Alkaline Phosphatase 77 30 - 99 U/L    Total Bilirubin 0.5 0.1 - 1.5 mg/dL    Albumin 5.1 (H) 3.2 - 4.9 g/dL    Total Protein 8.1 6.0 - 8.2 g/dL    Globulin 3.0 1.9 - 3.5 g/dL    A-G Ratio 1.7 g/dL   Prothrombin Time   Result Value Ref Range    PT 13.8 12.0 - 14.6 sec    INR 1.05 0.87 - 1.13   APTT   Result Value Ref Range    APTT 31.3 24.7 - 36.0 sec   Lipase   Result Value Ref Range    Lipase 32 11 - 82 U/L   ESTIMATED GFR   Result Value Ref Range    GFR If African American >60 >60 mL/min/1.73 m 2    GFR If Non African American >60 >60 mL/min/1.73 m 2   TROPONIN   Result Value Ref Range    Troponin I 16.59 (H) 0.00 - 0.04 ng/mL   MAGNESIUM   Result Value Ref Range    Magnesium 1.9 1.5 - 2.5 mg/dL   PHOSPHORUS   Result Value Ref Range    Phosphorus 4.4 2.5 - 4.5 mg/dL   EKG   Result Value Ref Range    Report       [facility]    Test Date:  2019  Pt Name:    RUTH NGUYEN              Department: ER  MRN:        [id number]                      Room:  Gender:     Male                         Technician: [id number]  :        1990                   Requested By:ER TRIAGE PROTOCOL  Order #:    [id number]                    Reading MD:    Measurements  Intervals                                Axis  Rate:       84                           P:          62  CT:         156                           QRS:        80  QRSD:       70                           T:          42  QT:         352  QTc:        417    Interpretive Statements  SINUS RHYTHM  No previous ECG available for comparison     EKG in four (4) hours   Result Value Ref Range    Report       [facility] Emergency Dept.    Test Date:  2019  Pt Name:    RUTH NGUYEN              Department: ER  MRN:        [id number]                      Room:        14  Gender:     Male                         Technician: [id number]  :        1990                   Requested By:CLAUDIA DALAL  Order #:    [id number]                    Reading MD:    Measurements  Intervals                                Axis  Rate:       70                           P:          33  AZ:         156                          QRS:        54  QRSD:       78                           T:          16  QT:         380  QTc:        410    Interpretive Statements  SINUS RHYTHM  Compared to ECG 2019 21:18:11  No significant changes         All labs reviewed by me.      RADIOLOGY  DX-CHEST-LIMITED (1 VIEW)   Final Result      No evidence of acute cardiopulmonary process.        The radiologist's interpretation of all radiological studies have been reviewed by me.    COURSE & MEDICAL DECISION MAKING  Pertinent Labs & Imaging studies reviewed. (See chart for details)    11:38 PM - Patient seen and examined at bedside.  Patient had chest pain triage protocol ordered and had grossly elevated troponin at 6.8.  His EKG is unremarkable and his chest pain-free at this time we will not give any aspirin due to low suspicion that this is acute coronary syndrome and more likely myocarditis.  Bedside ultrasound shows normal ejection fraction no gross valve dysfunction and no evidence of any pericardial.  Due to concerns that this is most likely myocarditis and not acute coronary syndrome or pericarditis NSAIDs will be held at this time there is no indication  "for heparinization at this time.  I discussed the case with cardiologist Dr. Martinez who agrees to consult on the patient and have also discussed with the internal medicine resident housestaff.  Patient be admitted to their care for further evaluation and treatment admitted in guarded condition.      Patient noted to have slightly elevated blood pressure likely circumstantial secondary to presenting complaint. Referred to primary care physician for further evaluation.        /103   Pulse 81   Temp 36.6 °C (97.9 °F) (Temporal)   Resp 15   Ht 1.778 m (5' 10\")   Wt 86.2 kg (190 lb)   SpO2 90%   BMI 27.26 kg/m²           FINAL IMPRESSION  1. Chest pain, unspecified type    2.  Probable myocarditis versus pericarditis      This dictation has been created using voice recognition software and/or scribes. The accuracy of the dictation is limited by the abilities of the software and the expertise of the scribes. I expect there may be some errors of grammar and possibly content. I made every attempt to manually correct the errors within my dictation. However, errors related to voice recognition software and/or scribes may still exist and should be interpreted within the appropriate context.            "

## 2019-01-24 NOTE — ED NOTES
1st pt contact:  Received to [facility] in no acute distress.  Cardiac monitor/NIBP/Pulse ox applied, repeat EKG done.  Pt denies c/o CP, nausea or SOB.  Call light in reach.

## 2019-01-24 NOTE — NON-PROVIDER
Internal Medicine Medical Student Note  Note Author: Sofia Childs, Student    Name Zeeshan Hewitt     1990   Age/Sex 28 y.o. male   MRN [id number]   Code Status FULL     Reason for interval visit  (Principal Problem)   Chest pain radiating to jaw   - started having burning chest pain after 2hr basketball, was able to sleep but pain still noticeable   - woke up with improved pain, again started after 3mi run, came into ED a few hours later with continued pain  -Explains pain as similar to when he previously had hand-foot-mouth coxsackie in   -Denies recent illness or sick contacts   -Fam Hx: MI brother at 42, DM in both parents and brother     Interval Problem Daily Status Update  (problem status, last 24 hours, new history, new data )   -Troponins: 6.87 at 11pm, 16.59 at 12:30am, 74 at 5:13am  -WNL: BMP, BNP, TSH, CRP, aPTT, PT, INR  -UDS: Cannabinoid+ and opiate+ (likely due to analgesics given in ED)  -Fasting lipids: TG elevated at 153, HDL low at 33, LDL high at 110  -CXR negative  -EKG normal  -ECHO:   -Mildly reduced left ventricular systolic function   -Left ventricular ejection fraction is visually estimated to be 45%   -There is hypokinesis of the basal to mid anteroseptal wall   -Grade I diastolic dysfunction   -Mild concentric left ventricular hypertrophy   -Borderline aneurysmal interatrial septum with less than 10 mm excursion     Physical Exam  Constitutional: Well-developed, well-appearing  Head: Normocephalic, atraumatic  CV: RRR, palpable pulses, no MRG appreciated   Pulm: Normal respiratory effort, no wheezes/rales   Abd: Normoactive BS, NT/ND  Neuro: A&Ox4     Vitals:    19 0530 19 0600 19 0630 19 0700   BP:       Pulse: 63 63 66 (!) 59   Resp:      Temp:       TempSrc:       SpO2: 94% 100% 93% 95%   Weight:       Height:         Body mass index is 27.26 kg/m². Weight: 86.2 kg (190 lb)  Oxygen Therapy:  Pulse Oximetry: 95 %, O2 (LPM):  0    Physical Exam    Assessment/Plan    Chest pain (ACS versus myocarditis): Pt presented with burning retrosternal pain that first started after 2h basketball on 1/22 and resolved with sleep. Recurrent pain, radiating to neck on 1/22 continuing for hours after 3mi run brought this pt to ED. Normal EKG/CXR, elevated troponin (6.87 -> 16.59 -> 74.93), ECHO demonstrating mildly reduced left ventricular systolic function, LVEF visually estimated at 45%, hypokinesis of the basal to mid anteroseptal wall, grade I diastolic dysfunction, mild concentric left ventricular hypertrophy, and borderline aneurysmal interatrial septum with less than 10 mm excursion.   -0.4mg NG Q5M PRN and 0.6mg colchicine BID for pain control   -Cont 81mg aspirin and 80mg atorvastatin  -Coronary angiography  -Cardiology following      Elevated troponin (ACS versus myocarditis): Since admit to ED troponin has trended up 6.87 -> 16.59 -> 74.93 (5:13AM).   -Cont trending EKGs and troponin   -Telemetry monitoring      Sofia Childs, MS3

## 2019-01-24 NOTE — CONSULTS
Cardiology Initial Consult Note    Date of note:    1/24/2019      Consulting Physician: Patric Tineo M.D.    Patient ID:    Name:   Zeeshan Hewitt   YOB: 1990  Age:   28 y.o.  male   MRN:   [id number]      Reason for Consultation: elevated troponin.     HPI:  Zeeshan Hewitt is a 28 y.o.-year-old male with a family history of early CAD who presents with chest pain.     He was in his usual state of health until around 9:30 PM last night when, after playing 2 hours of basketball, he had acute onset of 8/10 burning substernal chest pain radiating up to his neck.  He rested at home and eventually was able to fall asleep and when he woke up his pain was gone.  He originally attributed this to heart burn.  Today he went for a three mile run and afterwards the pain recurred and thus he drove himself to the emergency room for evaluation.  Patient denies palpitations, dyspnea on exertion, pre-syncope, syncope, lower extremity swelling, orthopnea, PND or recent weight gain.       Initial EKG showed no ischemic changes.  Initial troponin was 6.9.    He smokes occasional marijuana and has the occasional drink but nothing recently. He denies any recent URI symptoms, but has had sick contacts.  He has had diffuse arthralgias, but no rash. Of note, he did have similar symptoms years ago when he had coxsackie virus.     His brother did have an MI at age 42.       ROS  Constitution: Negative for chills, fever and night sweats.   HENT: Negative for nosebleeds.    Eyes: Negative for vision loss in left eye and vision loss in right eye.   Respiratory: Negative for hemoptysis.    Gastrointestinal: Negative for hematemesis, hematochezia and melena.   Genitourinary: Negative for hematuria.   Neurological: Negative for focal weakness, numbness and paresthesias.      All others reviewed and negative.      History reviewed. No pertinent past medical history. No past medical history.     Past Surgical History:  "  Procedure Laterality Date   • RADIUS ULNA ORIF  3/17/2014    Performed by Joby Gunderson M.D. at SURGERY [facility]       Medications: No prior to admission medications.       No Known Allergies      Family History   Problem Relation Age of Onset   • Diabetes Mother    • Diabetes Father    • Diabetes Brother    • Heart Attack Brother 42   • Heart Disease Brother         pacemaker         Social History     Social History   • Marital status: Single     Spouse name: N/A   • Number of children: N/A   • Years of education: N/A     Occupational History   • Not on file.     Social History Main Topics   • Smoking status: Never Smoker   • Smokeless tobacco: Never Used   • Alcohol use Yes      Comment: occasionally   • Drug use: Yes      Comment: weed   • Sexual activity: Not on file     Other Topics Concern   • Not on file     Social History Narrative   • No narrative on file         Physical Exam  Body mass index is 27.26 kg/m².  Blood pressure 160/103, pulse 84, temperature 36.6 °C (97.9 °F), temperature source Temporal, resp. rate 17, height 1.778 m (5' 10\"), weight 86.2 kg (190 lb), SpO2 92 %.  Vitals:    01/23/19 2123 01/23/19 2125 01/23/19 2330 01/24/19 0000   BP: 160/103      Pulse: 93  78 84   Resp: 18  17    Temp: 36.6 °C (97.9 °F)      TempSrc: Temporal      SpO2: 99%  95% 92%   Weight:  86.2 kg (190 lb)     Height:  1.778 m (5' 10\")       Oxygen Therapy:  Pulse Oximetry: 92 %, O2 (LPM): 0    General: Well appearing and in no apparent distress  Eyes: nl conjunctiva  ENT: OP clear  Neck: JVP 4 cm H2O, no carotid bruits  Lungs: normal respiratory effort, CTAB  Heart: RRR, no murmurs, no rubs or gallops, no edema bilateral lower extremities. No LV/RV heave on cardiac palpatation. 2+ bilateral radial pulses.  2+ bilateral dp pulses.   Abdomen: soft, non tender, non distended, no masses, normal bowel sounds.  No HSM.  Extremities/MSK: no clubbing, no cyanosis  Neurological: No focal sensory " deficits  Psychiatric: Appropriate affect, A/O x 3  Skin: Warm extremities        Labs (personally reviewed and notable for):   WBC 11  Trop 6.9      Cardiac Imaging and Procedures Review:    EKG dated 1/23/2019: My personal interpretation is NSR      Radiology test Review:  CXR:   FINDINGS:  There is no evidence of focal infiltrate or pulmonary edema.  The heart is normal in size.  There is no pleural effusion.  Bony structures and soft tissues are unremarkable.           Impression and Medical Decision Making:  # Chest pain with severely elevated troponin and normal EKG - ddx includes ischemia secondary to obstructive CAD vs vasospasm vs myopericarditis. Less likely dissection.     Recommendations:  # trial of ntg for pain control  # trend trop, ekg  # check lipids, hgbA1c, esr, crp with next lab draw.   # NPO for possible cath if necessary  # echo first thing in the morning  # Coronary CTA vs cath later today depending on clinical course tonight.   # no current indication for heparin given unlikely secondary to acute plaque rupture, will reassess tonight if pain recurs  # start empiric colchicine 0.6mg PO bid for pain  # would start aspirin 81mg PO daily    Case discussed with Dr. Schwartz.       Thank you for allowing me to participate in the care of this patient, Dr. Arias Barajas will continue to follow.  Please contact me with any questions.      Lj Martinez MD  Cardiologist, [facility] [place]   [phone]

## 2019-01-24 NOTE — ASSESSMENT & PLAN NOTE
Elevated troponin. Likely secondary to mid LAD stenosis with large thrombus.  -Troponin trending down after PCI.  Will remain elevated up to 24-48 hours due to PCI

## 2019-01-24 NOTE — PROGRESS NOTES
Internal Medicine Interval Note  Note Author: Love Saleem M.D.     Name Zeeshan Hewitt     1990   Age/Sex 28 y.o. male   MRN [id number]   Code Status FULL CODE     After 5PM or if no immediate response to page, please call for cross-coverage  Attending/Team: Dr. Duque / Pavel See Patient List for primary contact information  Call [phone] to page    1st Call - Day Intern (R1):   Dr. Saleem 2nd Call - Day Sr. Resident (R2/R3):   Dr. Luis     Reason for interval visit  (Principal Problem)   Chest pain    Interval Problem Daily Status Update  (24 hours, problem oriented, brief subjective history, new lab/imaging data pertinent to that problem)   - The patient was evaluated at the bedside. The patient is stable without any overnight event.  Patient denied having any recent infection or any major stress in life.  He occasionally smokes marijuana and drinks alcohol but did not do it recently.  He has a history of coxsackievirus infection in .  -He denied any palpitation, shortness of breath, syncope or abdominal pain.  He stated that his pain felt like burning in the chest which travel up to his neck.  He had 2 recent episodes of chest pain, one after playing basketball and another one after running for a 3 mile today.  He has exertional chest pain which might indicate stable angina however his pain lasted more than 30 minutes which is atypical cardiac pain.  - All the relevant labs and imaging were reviewed this morning.   - Zeeshan Hewitt's vitals were stable overnight and this morning.   -I discussed the plan of care for the day with the patient this morning.  -All the questions were answered this morning.  - No significant interval changes on the telemetry noted.    Review of Systems   Constitutional: Negative.    HENT: Negative.    Eyes: Negative.    Respiratory: Negative.    Cardiovascular: Positive for chest pain. Negative for palpitations, orthopnea, claudication, leg  swelling and PND.   Gastrointestinal: Negative.    Genitourinary: Negative.    Musculoskeletal: Negative.    Skin: Negative.    Neurological: Negative.    Endo/Heme/Allergies: Negative.    Psychiatric/Behavioral: Negative.    All other systems reviewed and are negative.    Disposition/Barriers to discharge:   Clinical improvement, disposition will be planned based on cardiac catheter results.  I expect to discharge him tomorrow if cardiac cath is unremarkable.    Consultants/Specialty  Cardiology  PCP: Pcp Pt States None    Quality Measures  Quality-Core Measures   Reviewed items::  EKG reviewed, Labs reviewed, Medications reviewed and Radiology images reviewed  Story catheter::  No Story  DVT: Holding DVT prophylaxis for possible cardiac cath.  Ulcer Prophylaxis::  Not indicated    Physical Exam       Vitals:    01/24/19 0530 01/24/19 0600 01/24/19 0630 01/24/19 0700   BP:       Pulse: 63 63 66 (!) 59   Resp: 20 19     Temp:       TempSrc:       SpO2: 94% 100% 93% 95%   Weight:       Height:         Body mass index is 27.26 kg/m². Weight: 86.2 kg (190 lb)  Oxygen Therapy:  Pulse Oximetry: 95 %, O2 (LPM): 0    Physical Exam   Constitutional: He is oriented to person, place, and time and well-developed, well-nourished, and in no distress. No distress.   HENT:   Head: Normocephalic and atraumatic.   Eyes: Pupils are equal, round, and reactive to light. EOM are normal.   Neck: Normal range of motion. Neck supple.   Cardiovascular: Normal rate, regular rhythm and normal heart sounds.    No murmur heard.  Pulmonary/Chest: Effort normal and breath sounds normal. No respiratory distress. He has no wheezes.   Abdominal: Soft. Bowel sounds are normal. He exhibits no distension. There is no tenderness.   Musculoskeletal: Normal range of motion. He exhibits no edema or tenderness.   Neurological: He is alert and oriented to person, place, and time.   Skin: Skin is warm and dry.   Psychiatric: Mood, memory, affect and judgment  normal.   Nursing note and vitals reviewed.    Assessment/Plan     * Chest pain radiating to jaw   Assessment & Plan    Atypical chest pain, duration of pain is more than 30 minutes.  His pain increases after exertion which might be stable angina however he has unremarkable EKG and chest x-ray on presentation.  Has a significant family history of premature MI  -Unremarkable ESR, CRP, TSH and HbA1c.  UDS positive for opiates and cannabis.  - Troponin I trending up  -Denies having any recent infection, concerns for pericarditis versus coronary vasospasm versus stable angina  - Nitroglycerin for pain control, and colchicine 0.6 mg for suspected pericarditis  -Echocardiogram performed in the ER: Ejection fraction 45%,hypokinesis of the basal to mid anteroseptal wall.  -Continue aspirin and atorvastatin  -Planned coronary angiography, cardiology following and appreciate recommendations.      Elevated troponin   Assessment & Plan    Troponin trending up. Likely secondary to myocarditis/pericarditis vs. acute coronary syndrome.   - Continue to trend with EKGs   - Telemetry monitoring     Elevated AST (SGOT)   Assessment & Plan    Likely secondary to systemic response to acute inflammatory process   - Continue to monitor   - If fails to resolve, consider transaminitis workup         Love Saleem M.D.

## 2019-01-24 NOTE — ASSESSMENT & PLAN NOTE
- Baseline creatinine 0.89 in 2015   - Cr 1.29 on admission   - IVF   - If fails to improve, consider JACKIE workup versus may be patient's new baseline

## 2019-01-24 NOTE — H&P
Internal Medicine Admitting History and Physical    Note Author: Anahi Jamison D.O.       Name Zeeshan Hewitt     1990   Age/Sex 28 y.o. male   MRN [id number]   Code Status Full Code      After 5PM or if no immediate response to page, please call for cross-coverage  Attending/Team: Dr. Duque / GABRIELA Zhoa See Patient List for primary contact information  Call [phone] to page    1st Call - Day Intern (R1):   Dr. Saleem 2nd Call - Day Sr. Resident (R2/R3):   Dr. Luis       Chief Complaint:   Chest Pain radiating to jaw     HPI:  Zeeshan Hewitt is a 27yo male who present to the hospital with chest pain. Patient states retrosternal, burning, chest pain that first started yesterday when he was playing basketball for a couple of hours. This pain radiated to his neck and he was able to rest eventually and fall asleep. Patient woke up with resolution of his pain. Patient today then went on a three mile run and the pain started to recur immediately as the previous day. Pain did not improve and patient drove himself from [place]. Patient also admits to small joint achiness that started in the last two days. He denies any recent illness but has sick family members. He also denies positional changes, exacerbating or relieving factors, diaphoresis, nausea, vomiting, palpitations, swelling, and dyspnea on exertion.     Of note, patient has history of Coxsackie virus in  in [place], [place] at which time patient was evaluated at an urgent care. Patient states his symptoms are similar in nature to when he had Coxsackie virus. Patient's family history of early CAD and pacemaker in place in his brother who is in his early forties.     In the ED, patient was noted to have a HR of 92 with /103. Patient was found to have elevated troponin of 6.87. Initial EKG was negative for any ischemic changes. Chest x ray was negative for acute cardiopulmonary process.  Patient received Morphine and Zofran in the ED.  Pain had improved by the time patient was evaluated.  Patient was admitted for further medical management.    Patient was seen by Cardiology and they recommended to continue monitoring troponin and EKGs, and start empiric colchicine. Depending on clinic course patient may need catheterization vs. Coronary CTA in the day.     Review of Systems   Constitutional: Positive for malaise/fatigue. Negative for chills, diaphoresis, fever and weight loss.   HENT: Negative for congestion, ear discharge, ear pain, hearing loss, nosebleeds, sore throat and tinnitus.         Denies jaw claudication  + jaw pain   Eyes: Negative for blurred vision, double vision, photophobia and pain.   Respiratory: Negative for cough, hemoptysis, sputum production, shortness of breath and wheezing.    Cardiovascular: Positive for chest pain. Negative for palpitations, orthopnea, claudication and leg swelling.   Gastrointestinal: Positive for heartburn. Negative for abdominal pain, blood in stool, constipation, diarrhea, melena, nausea and vomiting.   Genitourinary: Negative for dysuria, flank pain, frequency, hematuria and urgency.   Musculoskeletal: Positive for joint pain. Negative for back pain, myalgias and neck pain.   Skin: Negative for itching and rash.   Neurological: Negative for dizziness, tingling, tremors, sensory change, speech change, focal weakness, weakness and headaches.   Endo/Heme/Allergies: Negative for polydipsia.             Past Medical History (Chronic medical problem, known complications and current treatment)    History of Coxsackie Virus in 2011  History of Gastrochisis status post surgical revision   History of SBO     Past Surgical History:  Past Surgical History:   Procedure Laterality Date   • RADIUS ULNA ORIF  3/17/2014    Performed by Joby Gunderson M.D. at SURGERY [facility]       Current Outpatient Medications:  Home Medications     Reviewed by Franck Salinas (Pharmacy Tech) on 01/24/19 at 0009  Med  "List Status: Complete   Medication Last Dose Status        Patient Brian Taking any Medications                       Medication Allergy/Sensitivities:  No Known Allergies      Family History (mandatory)   Family History   Problem Relation Age of Onset   • Diabetes Mother    • Diabetes Father    • Diabetes Brother    • Heart Attack Brother 42   • Heart Disease Brother         pacemaker       Social History (mandatory)   Social History     Social History   • Marital status: Single     Spouse name: N/A   • Number of children: N/A   • Years of education: N/A     Occupational History   • Not on file.     Social History Main Topics   • Smoking status: Never Smoker   • Smokeless tobacco: Never Used   • Alcohol use Yes      Comment: occasionally   • Drug use: Yes      Comment: weed   • Sexual activity: Not on file     Other Topics Concern   • Not on file     Social History Narrative   • No narrative on file   Smoking: denies   ETOH: denies regular use, very socially   Recreational drugs: cannabis     Living situation:   PCP : Pcp Pt States None    Physical Exam     Vitals:    01/24/19 0045 01/24/19 0100 01/24/19 0130 01/24/19 0200   BP:       Pulse: 82 81 68    Resp:  15  14   Temp:       TempSrc:       SpO2: 95% 90% 94% 97%   Weight:       Height:         Body mass index is 27.26 kg/m².  /103   Pulse 68   Temp 36.6 °C (97.9 °F) (Temporal)   Resp 14   Ht 1.778 m (5' 10\")   Wt 86.2 kg (190 lb)   SpO2 97%   BMI 27.26 kg/m²   O2 therapy: Pulse Oximetry: 97 %, O2 (LPM): 0    Physical Exam   Constitutional: He is oriented to person, place, and time and well-developed, well-nourished, and in no distress. No distress.   Well appearing young male   HENT:   Head: Normocephalic and atraumatic.   Mouth/Throat: Oropharynx is clear and moist. No oropharyngeal exudate.   Head is non-tender to palpation   No TMJ tenderness  No oral lesions in his mouth    Eyes: Pupils are equal, round, and reactive to light. EOM are normal. " No scleral icterus.   Neck: Normal range of motion. No JVD present.   Cardiovascular: Normal rate, regular rhythm and intact distal pulses.  Exam reveals no friction rub.    No murmur heard.  Pulmonary/Chest: Breath sounds normal. No respiratory distress. He has no wheezes. He has no rales. He exhibits no tenderness.   Abdominal: Soft. Bowel sounds are normal. He exhibits no distension and no mass. There is no tenderness. There is no rebound and no guarding.   + surgical scars   Musculoskeletal: Normal range of motion. He exhibits no edema or deformity.   Lymphadenopathy:     He has cervical adenopathy.        Right cervical: Superficial cervical adenopathy present.        Left cervical: Superficial cervical adenopathy present.   Neurological: He is alert and oriented to person, place, and time. No cranial nerve deficit. He exhibits normal muscle tone.   Skin: Skin is warm and dry. No rash noted. He is not diaphoretic. No erythema.         Data Review       Old Records Request:   Completed  Current Records review/summary: Completed    Lab Data Review:  Recent Results (from the past 24 hour(s))   EKG    Collection Time: 19  9:18 PM   Result Value Ref Range    Report       [facility] Emergency Dept.    Test Date:  2019  Pt Name:    RUTH NGUYEN              Department: ER  MRN:        [id number]                      Room:        14  Gender:     Male                         Technician: [id number]  :        1990                   Requested By:ER TRIAGE PROTOCOL  Order #:    [id number]                    Reading MD: SUNG RIVERA MD    Measurements  Intervals                                Axis  Rate:       84                           P:          62  KY:         156                          QRS:        80  QRSD:       70                           T:          42  QT:         352  QTc:        417    Interpretive Statements  normal sinus rhythm at  84   , no ST elevation no ST  depression no T-wave  inversion appropriate R-wave progression normal axis normal intervals no  other  ischemic or arrhythmic features.    Electronically Signed On 1- 1:21:03 PST by SUNG RIVERA MD     Troponin    Collection Time: 01/23/19  9:32 PM   Result Value Ref Range    Troponin I 6.87 (H) 0.00 - 0.04 ng/mL   Btype Natriuretic Peptide    Collection Time: 01/23/19  9:32 PM   Result Value Ref Range    B Natriuretic Peptide 22 0 - 100 pg/mL   CBC with Differential    Collection Time: 01/23/19  9:32 PM   Result Value Ref Range    WBC 11.0 (H) 4.8 - 10.8 K/uL    RBC 5.68 4.70 - 6.10 M/uL    Hemoglobin 17.4 14.0 - 18.0 g/dL    Hematocrit 50.6 42.0 - 52.0 %    MCV 89.1 81.4 - 97.8 fL    MCH 30.6 27.0 - 33.0 pg    MCHC 34.4 33.7 - 35.3 g/dL    RDW 38.7 35.9 - 50.0 fL    Platelet Count 258 164 - 446 K/uL    MPV 9.5 9.0 - 12.9 fL    Neutrophils-Polys 80.40 (H) 44.00 - 72.00 %    Lymphocytes 12.70 (L) 22.00 - 41.00 %    Monocytes 5.50 0.00 - 13.40 %    Eosinophils 0.80 0.00 - 6.90 %    Basophils 0.40 0.00 - 1.80 %    Immature Granulocytes 0.20 0.00 - 0.90 %    Nucleated RBC 0.00 /100 WBC    Neutrophils (Absolute) 8.85 (H) 1.82 - 7.42 K/uL    Lymphs (Absolute) 1.40 1.00 - 4.80 K/uL    Monos (Absolute) 0.61 0.00 - 0.85 K/uL    Eos (Absolute) 0.09 0.00 - 0.51 K/uL    Baso (Absolute) 0.04 0.00 - 0.12 K/uL    Immature Granulocytes (abs) 0.02 0.00 - 0.11 K/uL    NRBC (Absolute) 0.00 K/uL   Complete Metabolic Panel (CMP)    Collection Time: 01/23/19  9:32 PM   Result Value Ref Range    Sodium 137 135 - 145 mmol/L    Potassium 3.8 3.6 - 5.5 mmol/L    Chloride 101 96 - 112 mmol/L    Co2 24 20 - 33 mmol/L    Anion Gap 12.0 (H) 0.0 - 11.9    Glucose 94 65 - 99 mg/dL    Bun 24 (H) 8 - 22 mg/dL    Creatinine 1.29 0.50 - 1.40 mg/dL    Calcium 9.7 8.5 - 10.5 mg/dL    AST(SGOT) 78 (H) 12 - 45 U/L    ALT(SGPT) 34 2 - 50 U/L    Alkaline Phosphatase 77 30 - 99 U/L    Total Bilirubin 0.5 0.1 - 1.5 mg/dL    Albumin 5.1 (H) 3.2 -  4.9 g/dL    Total Protein 8.1 6.0 - 8.2 g/dL    Globulin 3.0 1.9 - 3.5 g/dL    A-G Ratio 1.7 g/dL   Prothrombin Time    Collection Time: 19  9:32 PM   Result Value Ref Range    PT 13.8 12.0 - 14.6 sec    INR 1.05 0.87 - 1.13   APTT    Collection Time: 19  9:32 PM   Result Value Ref Range    APTT 31.3 24.7 - 36.0 sec   Lipase    Collection Time: 19  9:32 PM   Result Value Ref Range    Lipase 32 11 - 82 U/L   ESTIMATED GFR    Collection Time: 19  9:32 PM   Result Value Ref Range    GFR If African American >60 >60 mL/min/1.73 m 2    GFR If Non African American >60 >60 mL/min/1.73 m 2   TROPONIN    Collection Time: 19 11:57 PM   Result Value Ref Range    Troponin I 16.59 (H) 0.00 - 0.04 ng/mL   MAGNESIUM    Collection Time: 19 11:57 PM   Result Value Ref Range    Magnesium 1.9 1.5 - 2.5 mg/dL   PHOSPHORUS    Collection Time: 19 11:57 PM   Result Value Ref Range    Phosphorus 4.4 2.5 - 4.5 mg/dL   EKG in four (4) hours    Collection Time: 19  1:06 AM   Result Value Ref Range    Report       [facility] Emergency Dept.    Test Date:  2019  Pt Name:    RUTH NGUYEN              Department: ER  MRN:        [id number]                      Room:       [facility]  Gender:     Male                         Technician: [id number]  :        1990                   Requested By:IRTCRISTEL DALAL  Order #:    [id number]                    Reading MD:    Measurements  Intervals                                Axis  Rate:       70                           P:          33  FL:         156                          QRS:        54  QRSD:       78                           T:          16  QT:         380  QTc:        410    Interpretive Statements  SINUS RHYTHM  Compared to ECG 2019 21:18:11  No significant changes     URINE DRUG SCREEN    Collection Time: 19  1:25 AM   Result Value Ref Range    Amphetamines Urine Negative Negative    Barbiturates Negative Negative     Benzodiazepines Negative Negative    Cocaine Metabolite Negative Negative    Methadone Negative Negative    Opiates Positive (A) Negative    Oxycodone Negative Negative    Phencyclidine -Pcp Negative Negative    Propoxyphene Negative Negative    Cannabinoid Metab Positive (A) Negative       Imaging/Procedures Review:    Independant Imaging Review: Completed  EC-ECHOCARDIOGRAM COMPLETE W/O CONT         DX-CHEST-LIMITED (1 VIEW)   Final Result      No evidence of acute cardiopulmonary process.           EKG:   EKG Independent Review: Completed  QTc 417 HR 84 , Normal Sinus Rhythm, no ST/T changes   Records reviewed and summarized in current documentation :  Yes  Dictation #1  MRN:[id number]  CSN:[id number]    [facility] teaching service handout given to patient:  Yes         Assessment/Plan     * Chest pain radiating to jaw   Assessment & Plan    Patient presents with retrosternal, burning, chest pain that radiates to his jaw that started after his three mile run with a troponin elevation of 6.87. Although, there are no ischemic changes on the EKG. Patient's brother had an MI at the age of 42 and has a pacemaker in place. Differentials include coronary vasospasm, perimyocarditis and NSTEMI. Cardiology was consulted and appreciate recommendations.   Plan   - Admit to Telemetry   - Nitroglycerin for pain control  - Trend Troponins and EKG   - NPO for possible procedure   - Obtain Echocardiogram   - Day team to touch base with Cardiology regarding coronary CTA vs. Catheterization   - Hold off on Heparin   - Empiric Colchicine 0.6mg BID   - Daily Aspirin   - Atorvastatin      Elevated troponin   Assessment & Plan    Patient's troponin is 6.87 on admission. Likely secondary to myocarditis vs. Cardiac event.   - Continue to trend with EKGs   - Telemetry monitoring     Elevated AST (SGOT)   Assessment & Plan    Likely secondary to systemic response to acute inflammatory process   - Continue to monitor   - If fails to resolve,  consider transaminitis workup     JACKIE (acute kidney injury) (HCC)   Assessment & Plan    - Baseline creatinine 0.89 in 2015   - Cr 1.29 on admission   - IVF   - If fails to improve, consider JACKIE workup versus may be patient's new baseline         Anticipated Hospital stay:  >2 midnights     Quality Measures  Quality-Core Measures   Reviewed items::  EKG reviewed, Labs reviewed, Medications reviewed and Radiology images reviewed  Story catheter::  No Story  DVT prophylaxis - mechanical:  SCDs    PCP: Pcp Pt States None

## 2019-01-24 NOTE — ED NOTES
Lab called with critical result of troponin 16.59 at 0031. Critical lab result read back to lab.   Dr. Tineo notified of critical lab result at 0032.  Critical lab result read back by Dr. Tineo.

## 2019-01-24 NOTE — ED TRIAGE NOTES
C/o chest pain midline tingling pressure like in nature 5/10,  bilat jaw pain with headache and body aches..  States stated to happen after excesses for 2 days now.  Sometimes sob. Speaking in full sentences EKG performed, protocol ordered.  NAD noted. Will alert staff if conditions changes.

## 2019-01-24 NOTE — SENIOR ADMIT NOTE
" Senior Admission Note    In summary: Zeeshan Hewitt is a 28 y.o. 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 Patient is a , pretty healthy and active. He was apparently playing basketball for a few hours, did not have any symptoms but afterwards he developed sudden onset of chest \"burning\" in the center of his chest, radiating up to his bilateral jaw, associated with achy joints. He apparently then put some ice packs on and fell asleep therefore is unsure of how long the chest pain actually lasted. By the time he woke up in the morning his chest pain was gone    Denies shortness of breath, orthopnea, dyspnea, pedal edema, diaphoresis, recent fevers, chills, travel    He then went for a 3 mile run; again no symptoms during the run. But right after he developed the same constellation of symptoms so he came to the emergency department      Pertinent physical exam findings:    HEENT: grossly normal   Cardiovascular: Normal heart rate, Normal rhythm. No rub or murmur   Lungs: Respiratory effort is normal. Normal breath sounds  Abdomen:, No tenderness  Skin: No erythema, No rash  Lower limbs: normal, no pitting edema   Neurologic: Alert & oriented x 3, Normal motor function, Normal sensory function, No focal deficits noted, cranial nerves II through XII are normal  PSY: stable mood.   Other systems also examined, grossly normal.       Pertinent studies:   - WBC: 11  - Platelets 258  - Hb 17.4, MCV 89.1  - K 3.8  - BUN 24, Cr 1.29  - Mag 1.9  - Troponin 6.87 --> 16.59  - CXR: clear  - EKG: normal sinus rhythm x 2    Assessment and plan in summary:  1. Chest Pain  - with elevated troponin. EKG x 2 normal sinus rhythm  - ? myocarditis vs. Coronary event  - Cardiology on board  - continue to trend troponin and EKG  - follow-up with cardiology in the morning  - get " echo in am  - daily aspirin 81 mg  - Hb1c, lipid panel in AM  - Check ESR, CRP  - tele monitoring  - Per cards, NPO for possible cath depending on clinic course    2. Mild JACKIE: IV fluids as patient is NPO      For full plan, please see Intern note for details   Hattie Schwartz M.D.  PGY 2

## 2019-01-24 NOTE — ASSESSMENT & PLAN NOTE
Likely secondary to systemic response to acute inflammatory process   - Continue to monitor   - If fails to resolve, consider transaminitis workup

## 2019-01-24 NOTE — ASSESSMENT & PLAN NOTE
Atypical chest pain, duration of pain is more than 30 minutes.  His pain increases after exertion which might be stable angina however he has unremarkable EKG and chest x-ray on presentation.  Has a significant family history of premature MI  -Unremarkable ESR, CRP, TSH and HbA1c.  UDS positive for opiates and cannabis.  -Elevated troponin I on presentation.  -Denies having any recent infection, concerns for pericarditis versus coronary vasospasm versus stable angina  -Echocardiogram performed in the ER: Ejection fraction 45%,hypokinesis of the basal to mid anteroseptal wall.  -Underwent coronary angiography on 1/24.  Large thrombus in mid LAD with ruptured plaue and about 60% stenosis.  [facility] stent was placed.  -Continue aspirin, ticagrelor for 1 year and atorvastatin 80 mg.  - EF 45%, PFO with ASA. May need outpatient follow for PFO closure.

## 2019-01-24 NOTE — ED NOTES
Break RN  Dr. Saleem w/[facility] notified that repeat EKG has been done on pt and updated on the repeat troponin result.

## 2019-01-24 NOTE — ED NOTES
Med rec completed per pt.   Antibiotics within last 30 days: No  Patient allergies have been reviewed: NKDA  Comments: Denies taking medications.

## 2019-01-24 NOTE — ED NOTES
Kayla from Lab called with critical result of trop 73.93  at 0620. Critical lab result read back to Kyala.   Dr. Fish informed, (cardiology, and [facility] admitting paged to notify) notified of critical lab result at 0620.  Critical lab result read back by Dr. Fish. Awaiting call back from other providers.

## 2019-01-25 LAB
ANION GAP SERPL CALC-SCNC: 8 MMOL/L (ref 0–11.9)
BUN SERPL-MCNC: 16 MG/DL (ref 8–22)
CALCIUM SERPL-MCNC: 9.1 MG/DL (ref 8.5–10.5)
CHLORIDE SERPL-SCNC: 103 MMOL/L (ref 96–112)
CO2 SERPL-SCNC: 27 MMOL/L (ref 20–33)
CREAT SERPL-MCNC: 1.08 MG/DL (ref 0.5–1.4)
EKG IMPRESSION: NORMAL
ERYTHROCYTE [DISTWIDTH] IN BLOOD BY AUTOMATED COUNT: 39.8 FL (ref 35.9–50)
GLUCOSE SERPL-MCNC: 87 MG/DL (ref 65–99)
HCT VFR BLD AUTO: 50.1 % (ref 42–52)
HGB BLD-MCNC: 17.1 G/DL (ref 14–18)
MCH RBC QN AUTO: 30.7 PG (ref 27–33)
MCHC RBC AUTO-ENTMCNC: 34.1 G/DL (ref 33.7–35.3)
MCV RBC AUTO: 89.9 FL (ref 81.4–97.8)
PLATELET # BLD AUTO: 285 K/UL (ref 164–446)
PMV BLD AUTO: 9.7 FL (ref 9–12.9)
POTASSIUM SERPL-SCNC: 3.6 MMOL/L (ref 3.6–5.5)
RBC # BLD AUTO: 5.57 M/UL (ref 4.7–6.1)
SODIUM SERPL-SCNC: 138 MMOL/L (ref 135–145)
TROPONIN I SERPL-MCNC: 24.97 NG/ML (ref 0–0.04)
WBC # BLD AUTO: 9.8 K/UL (ref 4.8–10.8)

## 2019-01-25 PROCEDURE — A9270 NON-COVERED ITEM OR SERVICE: HCPCS | Performed by: INTERNAL MEDICINE

## 2019-01-25 PROCEDURE — 700102 HCHG RX REV CODE 250 W/ 637 OVERRIDE(OP): Performed by: STUDENT IN AN ORGANIZED HEALTH CARE EDUCATION/TRAINING PROGRAM

## 2019-01-25 PROCEDURE — 99233 SBSQ HOSP IP/OBS HIGH 50: CPT | Performed by: INTERNAL MEDICINE

## 2019-01-25 PROCEDURE — 770020 HCHG ROOM/CARE - TELE (206)

## 2019-01-25 PROCEDURE — A9270 NON-COVERED ITEM OR SERVICE: HCPCS | Performed by: STUDENT IN AN ORGANIZED HEALTH CARE EDUCATION/TRAINING PROGRAM

## 2019-01-25 PROCEDURE — 93010 ELECTROCARDIOGRAM REPORT: CPT | Performed by: INTERNAL MEDICINE

## 2019-01-25 PROCEDURE — 80048 BASIC METABOLIC PNL TOTAL CA: CPT

## 2019-01-25 PROCEDURE — 97162 PT EVAL MOD COMPLEX 30 MIN: CPT

## 2019-01-25 PROCEDURE — 84484 ASSAY OF TROPONIN QUANT: CPT

## 2019-01-25 PROCEDURE — 85027 COMPLETE CBC AUTOMATED: CPT

## 2019-01-25 PROCEDURE — 700105 HCHG RX REV CODE 258: Performed by: STUDENT IN AN ORGANIZED HEALTH CARE EDUCATION/TRAINING PROGRAM

## 2019-01-25 PROCEDURE — 700102 HCHG RX REV CODE 250 W/ 637 OVERRIDE(OP): Performed by: INTERNAL MEDICINE

## 2019-01-25 PROCEDURE — 36415 COLL VENOUS BLD VENIPUNCTURE: CPT

## 2019-01-25 PROCEDURE — 93005 ELECTROCARDIOGRAM TRACING: CPT | Performed by: STUDENT IN AN ORGANIZED HEALTH CARE EDUCATION/TRAINING PROGRAM

## 2019-01-25 RX ADMIN — COLCHICINE 0.6 MG: 0.6 TABLET, FILM COATED ORAL at 06:26

## 2019-01-25 RX ADMIN — SODIUM CHLORIDE: 9 INJECTION, SOLUTION INTRAVENOUS at 02:04

## 2019-01-25 RX ADMIN — ATORVASTATIN CALCIUM 80 MG: 80 TABLET, FILM COATED ORAL at 16:55

## 2019-01-25 RX ADMIN — TICAGRELOR 90 MG: 90 TABLET ORAL at 06:00

## 2019-01-25 RX ADMIN — TICAGRELOR 90 MG: 90 TABLET ORAL at 16:57

## 2019-01-25 RX ADMIN — ASPIRIN 81 MG: 81 TABLET, COATED ORAL at 06:27

## 2019-01-25 ASSESSMENT — ENCOUNTER SYMPTOMS
STRIDOR: 0
DIAPHORESIS: 0
PSYCHIATRIC NEGATIVE: 1
MUSCULOSKELETAL NEGATIVE: 1
CHEST TIGHTNESS: 0
ORTHOPNEA: 0
CONSTITUTIONAL NEGATIVE: 1
LIGHT-HEADEDNESS: 0
CHILLS: 0
COUGH: 0
ABDOMINAL PAIN: 0
PALPITATIONS: 0
COLOR CHANGE: 0
RESPIRATORY NEGATIVE: 1
DIZZINESS: 0
WHEEZING: 0
PND: 0
FEVER: 0
GASTROINTESTINAL NEGATIVE: 1
FATIGUE: 0
CLAUDICATION: 0
EYES NEGATIVE: 1
SHORTNESS OF BREATH: 0
NEUROLOGICAL NEGATIVE: 1

## 2019-01-25 ASSESSMENT — COGNITIVE AND FUNCTIONAL STATUS - GENERAL
MOBILITY SCORE: 24
SUGGESTED CMS G CODE MODIFIER MOBILITY: CH

## 2019-01-25 NOTE — PROGRESS NOTES
Cardiology Follow Up Progress Note    Date of Service  1/25/2019    Attending Physician  JOJO Hernandez*    Chief Complaint   Chest pain    Cardiology was consulted for chest pain and elevated troponins.     YOUNG Hewitt is a 28 y.o. male admitted 1/23/2019 with non-ST elevation myocardial infarction.  Patient presented with chest pain radiating to his neck x 1 day.     Initial EKG showed no ischemic changes and initial troponin was 6.9. Troponin peaked at 73.93 and patient underwent cardiac catheterization with PCI and DUKE stents x 2 to the LAD.     Patient has no significant past medical history. He was a family history of early CAD and a brother who had an MI at age 42.     Interim Events  1/25/19: Patient up ambulating without difficulty.  He denies chest pain, palpitations, shortness of breath, orthopnea, PND, lightheadedness or dizziness, and/or lower extremity edema.  No significant events of rhythm issues overnight. Tele monitor showed SR 60-89 bpm without ectopy.  Right groin site CDI without evidence of erythema, edema, or hematoma.    Review of Systems  Review of Systems   Constitutional: Negative for chills, diaphoresis, fatigue and fever.   Respiratory: Negative for cough, chest tightness, shortness of breath, wheezing and stridor.    Cardiovascular: Negative for chest pain, palpitations and leg swelling.   Gastrointestinal: Negative for abdominal pain.   Genitourinary: Negative for difficulty urinating and hematuria.   Skin: Negative for color change.   Neurological: Negative for dizziness and light-headedness.     Vital signs in last 24 hours  Temp:  [36.4 °C (97.6 °F)-36.9 °C (98.4 °F)] 36.7 °C (98 °F)  Pulse:  [62-90] 88  Resp:  [4-17] 16  BP: ()/(55-83) 145/71  SpO2:  [93 %-97 %] 97 %    Physical Exam  Physical Exam   Constitutional: He is oriented to person, place, and time. He appears well-developed and well-nourished. No distress.   HENT:   Head: Normocephalic.    Eyes: Pupils are equal, round, and reactive to light.   Neck: No JVD present. No thyromegaly present.   Cardiovascular: Normal rate and regular rhythm.    No murmur heard.  Pulses:       Radial pulses are 2+ on the right side, and 2+ on the left side.        Dorsalis pedis pulses are 2+ on the right side, and 2+ on the left side.   Pulmonary/Chest: Effort normal and breath sounds normal. No respiratory distress. He has no wheezes. He has no rales. He exhibits no tenderness.   Abdominal: Soft. There is no tenderness.   Musculoskeletal: He exhibits no edema.   Neurological: He is alert and oriented to person, place, and time.   Skin: Skin is warm and dry. He is not diaphoretic. No erythema.   Right groin site soft, CDI, no signs of edema, erythema, or hematoma.   Psychiatric: He has a normal mood and affect. His behavior is normal. Judgment and thought content normal.   Nursing note and vitals reviewed.    Lab Review  Lab Results   Component Value Date/Time    WBC 9.8 01/25/2019 12:41 AM    RBC 5.57 01/25/2019 12:41 AM    HEMOGLOBIN 17.1 01/25/2019 12:41 AM    HEMATOCRIT 50.1 01/25/2019 12:41 AM    MCV 89.9 01/25/2019 12:41 AM    MCH 30.7 01/25/2019 12:41 AM    MCHC 34.1 01/25/2019 12:41 AM    MPV 9.7 01/25/2019 12:41 AM      Lab Results   Component Value Date/Time    SODIUM 138 01/25/2019 12:41 AM    POTASSIUM 3.6 01/25/2019 12:41 AM    CHLORIDE 103 01/25/2019 12:41 AM    CO2 27 01/25/2019 12:41 AM    GLUCOSE 87 01/25/2019 12:41 AM    BUN 16 01/25/2019 12:41 AM    CREATININE 1.08 01/25/2019 12:41 AM      Lab Results   Component Value Date/Time    ASTSGOT 144 (H) 01/24/2019 05:13 AM    ALTSGPT 42 01/24/2019 05:13 AM     Lab Results   Component Value Date/Time    CHOLSTRLTOT 174 01/24/2019 05:13 AM     (H) 01/24/2019 05:13 AM    HDL 33 (A) 01/24/2019 05:13 AM    TRIGLYCERIDE 153 (H) 01/24/2019 05:13 AM    TROPONINI 24.97 (H) 01/25/2019 07:14 AM       Recent Labs      01/23/19   2132   BNPBTYPENAT  22        Cardiac Imaging and Procedures Review  EKG 01/25/2019:  SINUS RHYTHM   LEFT BUNDLE BRANCH BLOCK   Compared to ECG 01/24/2019 19:13:53   No significant changes      Echocardiogram 01/24/2019:  Mildly reduced left ventricular systolic function.  Left ventricular ejection fraction is visually estimated to be 45%.  There is hypokinesis of the basal to mid anteroseptal wall.  Grade I diastolic dysfunction.  Mild concentric left ventricular hypertrophy.  Borderline aneurysmal interatrial septum with less than 10 mm excursion   is present.  No patent foramen ovale present by doppler interrogation.  No significant valve disease or flow abnormalities.   Right heart pressures are normal.   No prior study is available for comparison.     Cardiac Catheterization 01/24/2019:  Pre-operative Diagnosis:  Chest discomfort with new left bundle branch block and wall motion abnormality on echocardiography suspecting acute coronary syndrome  Post-operative Diagnosis:   Acute coronary syndrome secondary to 70% mid left anterior descending artery stenosis with large intraluminal thrombus  Findings:  aspiration thrombectomy using priority 1 catheter hoping to avoid placement of any stent small amount of thrombus was retrieved after a couple passes but some subsequent angiography still showed significant residual stenosis.  We therefore decided to perform primary stenting using a 3.5 x 12 mm Synergy drug eluting stent.  Subsequent angiography however still showed at least moderate stenosis proximal to the stent, therefore additional 3.5 x 8 mm stent was deployed was deployed at 12 mari.  Both stents were post dilated with 3.5 x 15 mm non-compliance balloon upto 14 ATMs.  After intervention, there was 10% residual stenosis in LAD with ÁNGEL III flow.    Assessment/Plan  Non-ST elevation Myocardial Infarction (NSTEMI)  - s/p PCI with DUKE stents x 2 to the LAD.  - Troponin peaked at 73.93.  - Blood glucose and TSH WNL.   - ECHO (1/23/19)  showed reduced LVEF 45%.  - Repeat ECHO, ordered  - Continue DAPT therapy with ASA 81 mg daily and Brilinta 90 mg BID.   - Continue Lipitor 80 mg daily.  - May consider adding beta-blocker if BP and heart rate tolerate.   - Cardiac rehab referral placed.     Hypertension  - Stable.   - Continue current medical management per above.     Hyperlipidemia  -  (1/24/19)  - Continue Lipitor per above.     Thank you for allowing me to participate in the care of this patient.  I will continue to follow this patient    Please contact me with any questions.    LATOYA Cronin.   Cardiologist, [facility]  [phone]

## 2019-01-25 NOTE — PROGRESS NOTES
Assumed care at 1900, bedside report received from day shift RN. Pt is Sr on the telemetry monitor. Patient is AO x 4 and is resting in bed with no complaints of pain. Initial assessment completed and orders reviewed. POC addressed with patient. Call light within reach and hourly rounding in place. No further questions at this time. Patient on bed rest until 0030. Post op vitals in place.

## 2019-01-25 NOTE — OR SURGEON
Immediate Post-Operative Note      PreOp Diagnosis: CP with new LBBB and elevated Tn, prob ACS    PostOp Diagnosis: Large thrombus in mid LAD with ruptured plaue and about 60% stenosis, no other sig disease seen  S/p 3.5x12+ 8 mm Synergy to mid LAD about 10% residual, ÁNGEL III    Procedure(s) :  Coronary Angiography  Aspiration thrombectomy  PCI LAD  Angioseal    Surgeon(s):  Cris Bridges M.D.    Type of Anesthesia: Moderate Sedation    Specimen: None    Estimated Blood Loss: 20 cc's      Findings: As above    Complications: none      Cris Bridges M.D.  1/24/2019 6:20 PM

## 2019-01-25 NOTE — CARE PLAN
Problem: Safety  Goal: Will remain free from falls  Outcome: PROGRESSING AS EXPECTED  Pt mobility assessed at start of shift. Pt able to be up self. Steady on feet with steady gait. No complaints of dizziness.    Problem: Pain Management  Goal: Pain level will decrease to patient's comfort goal  Outcome: PROGRESSING AS EXPECTED  Pt currently with complaints of groin cath site tenderness but within comfort goal and not currently requiring intervention.

## 2019-01-25 NOTE — PROGRESS NOTES
Internal Medicine Interval Note  Note Author: Love Saleem M.D.     Name Zeeshan Hewitt     1990   Age/Sex 28 y.o. male   MRN [id number]   Code Status FULL CODE     After 5PM or if no immediate response to page, please call for cross-coverage  Attending/Team: Dr. Duque / Pavel See Patient List for primary contact information  Call [phone] to page    1st Call - Day Intern (R1):   Dr. Saleem 2nd Call - Day Sr. Resident (R2/R3):   Dr. Luis     Reason for interval visit  (Principal Problem)   Chest pain    Interval Problem Daily Status Update  (24 hours, problem oriented, brief subjective history, new lab/imaging data pertinent to that problem)   - The patient was evaluated at the bedside. The patient is stable without any overnight event.  He denied having any active chest pain at this time.  He has some soreness on the catheter into the inside on the right femoral artery.  -He will stay here in the hospital for next 24 hours for observation.  - All the relevant labs and imaging were reviewed this morning.   - Zeeshan Hewitt's vitals were stable overnight and this morning.   -I discussed the plan of care for the day with the patient this morning.  -All the questions were answered this morning.  - No significant interval changes on the telemetry noted.    Review of Systems   Constitutional: Negative.    HENT: Negative.    Eyes: Negative.    Respiratory: Negative.    Cardiovascular: Positive for chest pain. Negative for palpitations, orthopnea, claudication, leg swelling and PND.   Gastrointestinal: Negative.    Genitourinary: Negative.    Musculoskeletal: Negative.    Skin: Negative.    Neurological: Negative.    Endo/Heme/Allergies: Negative.    Psychiatric/Behavioral: Negative.    All other systems reviewed and are negative.    Disposition/Barriers to discharge:   Echocardiographic pending, plan discharge tomorrow    Consultants/Specialty  Cardiology  PCP: Pcp Pt States  None    Quality Measures  Quality-Core Measures   Reviewed items::  EKG reviewed, Labs reviewed and Medications reviewed  Story catheter::  No Story  DVT prophylaxis pharmacological::  Not indicated at this time, ambulatory  DVT prophylaxis - mechanical:  Not indicated at this time, ambulatory  Ulcer Prophylaxis::  Not indicated    Physical Exam       Vitals:    01/25/19 0121 01/25/19 0420 01/25/19 0856 01/25/19 1246   BP: 113/70 109/55 138/78 145/71   Pulse: 79 62 77 88   Resp: 17 16 16 16   Temp:  36.6 °C (97.8 °F) 36.8 °C (98.2 °F) 36.7 °C (98 °F)   TempSrc:  Temporal Temporal Temporal   SpO2: 94% 94% 93% 97%   Weight:       Height:         Body mass index is 27.96 kg/m². Weight: 88.4 kg (194 lb 14.2 oz) (bedrest)  Oxygen Therapy:  Pulse Oximetry: 97 %, O2 (LPM): 0, O2 Delivery: None (Room Air)    Physical Exam   Constitutional: He is oriented to person, place, and time and well-developed, well-nourished, and in no distress. No distress.   HENT:   Head: Normocephalic and atraumatic.   Eyes: Pupils are equal, round, and reactive to light. EOM are normal.   Neck: Normal range of motion. Neck supple.   Cardiovascular: Normal rate, regular rhythm and normal heart sounds.    No murmur heard.  Pulmonary/Chest: Effort normal and breath sounds normal. No respiratory distress. He has no wheezes.   Abdominal: Soft. Bowel sounds are normal. He exhibits no distension. There is no tenderness.   Musculoskeletal: Normal range of motion. He exhibits no edema or tenderness.   Neurological: He is alert and oriented to person, place, and time.   Skin: Skin is warm and dry.   Psychiatric: Mood, memory, affect and judgment normal.   Nursing note and vitals reviewed.    Assessment/Plan     * Chest pain radiating to jaw   Assessment & Plan    Atypical chest pain, duration of pain is more than 30 minutes.  His pain increases after exertion which might be stable angina however he has unremarkable EKG and chest x-ray on presentation.  Has  a significant family history of premature MI  -Unremarkable ESR, CRP, TSH and HbA1c.  UDS positive for opiates and cannabis.  -Elevated troponin I on presentation.  -Denies having any recent infection, concerns for pericarditis versus coronary vasospasm versus stable angina  -Echocardiogram performed in the ER: Ejection fraction 45%,hypokinesis of the basal to mid anteroseptal wall.  -Underwent coronary angiography on 1/24.  Large thrombus in mid LAD with ruptured plaue and about 60% stenosis.  DUKE stent was placed.  -Continue aspirin, ticagrelor for 1 year and atorvastatin 80 mg.  -Follow-up with echocardiography     Elevated troponin   Assessment & Plan    Elevated troponin. Likely secondary to mid LAD stenosis with large thrombus.  -Troponin trending down after PCI.  Will remain elevated up to 24-48 hours due to PCI     Elevated AST (SGOT)   Assessment & Plan    Likely secondary to systemic response to acute inflammatory process   - Continue to monitor   - If fails to resolve, consider transaminitis workup         Love Saleem M.D.

## 2019-01-25 NOTE — PROGRESS NOTES
Assumed care of patient at bedside report from NOC RN. Updated on POC. Patient currently A & O x 4; on room air; up self, steady on feet with steady gait; with complaints of groin cath site tenderness. Groin cath site CDI and soft. Call light within reach. Fall precautions in place. Bed locked and in lowest position. All questions answered. No other needs indicated at this time.

## 2019-01-25 NOTE — PROGRESS NOTES
Assumed care at 1840, bedside report received from cath lab RN. Patient is AOx4 with no signs of distress. Pt. Is SR on the monitor. Initial assessment completed, orders reviewed, call light within reach, and hourly rounding in place. POC addressed with patient, no additional questions at this time.

## 2019-01-25 NOTE — OP REPORT
Cardiac catheterization report    Procedure date: 1/24/2019    Referring physician: Dr. Julio C Barajas    Pre-operative Diagnosis:  Chest discomfort with new left bundle branch block and wall motion abnormality on echocardiography suspecting acute coronary syndrome    Post-operative Diagnosis:   Acute coronary syndrome secondary to 70% mid left anterior descending artery stenosis with large intraluminal thrombus    Procedure:  1.  Selective coronary angiography  2.  Aspiration thrombectomy  3.  Percutaneous coronary intervention of the left anterior descending artery  4.  Supervision of moderate conscious sedation    Complications: None    Description of Procedure:    After informed consent was obtained, the patient was brought to cardiac catheterization laboratory in fasting state.   Right wrist and right groin were then prepped and draped in the usual sterile fashion.  Versed and fentanyl administered for conscious sedation.  We initially attempted to perform the procedure via radial approach.  After 2% Xylocaine was applied to the right wrist, radial artery was identified but the guidewire could not be advanced easily therefore it was then withdrawn.  Subsequent ultrasound interrogation revealed that the radial artery was rather small, we therefore decided to perform the procedure from via femoral approach.      After local anesthetic was applied, using portable ultrasound of the right common femoral artery was identified and a 4 [place] sheath was placed using modified Seldinger technique.  Selective angiography of the left coronary artery was initially performed using 4 [place] JL4 catheter.  We noted that the images were somewhat suboptimal due to the catheter was not coaxial to the left main. The catheter was exchanged to a 4 [place]ese JL 4.5 and additional angiography was performed.  Due to his anatomy, more than usual we will had to be taken.  Selective angiography of the right coronary artery was then  performed using 4 [place]  3DRC catheter.    After reviewing of the diagnostic angiography, it was felt that intervention of the left anterior descending artery artery was indicated.  The femoral artery sheath was then exchanged to a 6 [place] sheath  Bivalrudin was then started for anticoagulation.  A 6 [place] EBU 3.5 guide catheter was used.  A 0.014 BMW wire was then advanced pass the stenosis.   We first perform aspiration thrombectomy using priority 1 catheter hoping to avoid placement of any stent small amount of thrombus was retrieved after a couple passes but some subsequent angiography still showed significant residual stenosis.  We therefore decided to perform primary stenting using a 3.5 x 12 mm Synergy drug eluting stent.  Subsequent angiography however still showed at least moderate stenosis proximal to the stent, therefore additional 3.5 x 8 mm stent was deployed was deployed at 12 mari.  Both stents were post dilated with 3.5 x 15 mm non-compliance balloon upto 14 ATMs.  Subsequent angiography showed about 10% residual stenosis with ÁNGEL III flow.   The guide wire was subsequently removed and final angiography was performed.  It confirmed good result. The guide catheter was then removed.   Angiography of the right femoral artery was performed and felt to be suitable for closure device.  The femoral artery sheath was removed.    Hemostasis was obtained using Angio-Seal.   The patient was then given a loading dose of Brilinta.  Bivalirudin was subsequently reduced to low dose infusion.  The patient tolerated procedure well and left cardiac catheterization laboratory in stable condition.    I supervised monitoring the patient under moderate conscious sedation beginning at 4:53 PM until the end of the case at 6:16 PM.    Findings:    Coronary artery disease    This is right dominant system.    Left main is large and without flow limiting disease.  It bifurcated into left anterior descending and left  circumflex artery.     Left anterior descending artery is large caliber vessel and extends slightly past the apex. It gives rises to 1 large diagonal branch proximally.  There was eccentric 70% stenosis in the mid portion of the left anterior descending artery (LAD) immediately after the origin of the diagonal branch with filling defect indicating relatively large amounts of thrombus.  There is no other signficant disease in the left anterior descending artery or its major branches. The antegrade flow is normal.    Left circumflex artery is large in caliber.   It gives rise to one large and one small obtuse marginal branches.  There is no significant disease in the LCX system.  The antegrade flow is normal.    Right coronary artery (RCA) is large caliber. It gives rise to several small to medium sized acute marginal branches, small posterior descending artery and posterolateral branch. There is no significant disease in the right coronary artery or its major branches.The antegrade flow is normal.    After intervention, there was 10% residual stenosis in LAD with ÁNGEL III flow.    Plan:     Dual antiplatelet therapy for one year. Risk factor modification.  Bedrest for 4 hours.      Cris Bridges M.D.

## 2019-01-26 ENCOUNTER — APPOINTMENT (OUTPATIENT)
Dept: CARDIOLOGY | Facility: MEDICAL CENTER | Age: 29
DRG: 247 | End: 2019-01-26
Attending: INTERNAL MEDICINE
Payer: COMMERCIAL

## 2019-01-26 ENCOUNTER — PATIENT OUTREACH (OUTPATIENT)
Dept: HEALTH INFORMATION MANAGEMENT | Facility: OTHER | Age: 29
End: 2019-01-26

## 2019-01-26 VITALS
DIASTOLIC BLOOD PRESSURE: 71 MMHG | RESPIRATION RATE: 16 BRPM | WEIGHT: 195.11 LBS | HEART RATE: 63 BPM | OXYGEN SATURATION: 93 % | TEMPERATURE: 98.6 F | BODY MASS INDEX: 27.93 KG/M2 | SYSTOLIC BLOOD PRESSURE: 126 MMHG | HEIGHT: 70 IN

## 2019-01-26 PROBLEM — I21.02 ACUTE MYOCARDIAL INFARCTION INVOLVING LEFT ANTERIOR DESCENDING (LAD) CORONARY ARTERY (HCC): Status: RESOLVED | Noted: 2019-01-24 | Resolved: 2019-01-26

## 2019-01-26 PROBLEM — Z95.5 STATUS POST INSERTION OF DRUG-ELUTING STENT INTO LEFT ANTERIOR DESCENDING ARTERY: Chronic | Status: ACTIVE | Noted: 2019-01-24

## 2019-01-26 PROBLEM — I21.02 ACUTE MYOCARDIAL INFARCTION INVOLVING LEFT ANTERIOR DESCENDING (LAD) CORONARY ARTERY (HCC): Status: ACTIVE | Noted: 2019-01-24

## 2019-01-26 LAB
LV EJECT FRACT  99904: 45
LV EJECT FRACT MOD 2C 99903: 45.68
LV EJECT FRACT MOD 4C 99902: 43.29
LV EJECT FRACT MOD BP 99901: 42.62

## 2019-01-26 PROCEDURE — 700102 HCHG RX REV CODE 250 W/ 637 OVERRIDE(OP): Performed by: INTERNAL MEDICINE

## 2019-01-26 PROCEDURE — 99232 SBSQ HOSP IP/OBS MODERATE 35: CPT | Performed by: INTERNAL MEDICINE

## 2019-01-26 PROCEDURE — 93306 TTE W/DOPPLER COMPLETE: CPT

## 2019-01-26 PROCEDURE — A9270 NON-COVERED ITEM OR SERVICE: HCPCS | Performed by: INTERNAL MEDICINE

## 2019-01-26 PROCEDURE — 99238 HOSP IP/OBS DSCHRG MGMT 30/<: CPT | Performed by: INTERNAL MEDICINE

## 2019-01-26 PROCEDURE — 93306 TTE W/DOPPLER COMPLETE: CPT | Mod: 26 | Performed by: INTERNAL MEDICINE

## 2019-01-26 RX ORDER — METOPROLOL SUCCINATE 25 MG/1
12.5 TABLET, EXTENDED RELEASE ORAL DAILY
Qty: 15 TAB | Refills: 2 | Status: SHIPPED | OUTPATIENT
Start: 2019-01-26 | End: 2019-01-26

## 2019-01-26 RX ORDER — ATORVASTATIN CALCIUM 80 MG/1
80 TABLET, FILM COATED ORAL EVERY EVENING
Qty: 30 TAB | Refills: 11 | Status: SHIPPED | OUTPATIENT
Start: 2019-01-26 | End: 2019-02-08 | Stop reason: SDUPTHER

## 2019-01-26 RX ORDER — CLOPIDOGREL BISULFATE 75 MG/1
75 TABLET ORAL DAILY
Qty: 30 TAB | Refills: 11 | Status: SHIPPED | OUTPATIENT
Start: 2019-01-26 | End: 2019-01-26

## 2019-01-26 RX ORDER — METOPROLOL SUCCINATE 25 MG/1
12.5 TABLET, EXTENDED RELEASE ORAL DAILY
Qty: 15 TAB | Refills: 11 | Status: SHIPPED | OUTPATIENT
Start: 2019-01-26 | End: 2019-02-08 | Stop reason: SDUPTHER

## 2019-01-26 RX ORDER — ASPIRIN 81 MG/1
81 TABLET ORAL DAILY
Qty: 30 TAB | Refills: 5 | Status: SHIPPED | OUTPATIENT
Start: 2019-01-27

## 2019-01-26 RX ORDER — ATORVASTATIN CALCIUM 80 MG/1
80 TABLET, FILM COATED ORAL EVERY EVENING
Qty: 30 TAB | Refills: 5 | Status: SHIPPED | OUTPATIENT
Start: 2019-01-26 | End: 2019-01-26

## 2019-01-26 RX ADMIN — ASPIRIN 81 MG: 81 TABLET, COATED ORAL at 05:11

## 2019-01-26 RX ADMIN — TICAGRELOR 90 MG: 90 TABLET ORAL at 05:11

## 2019-01-26 ASSESSMENT — ENCOUNTER SYMPTOMS
COLOR CHANGE: 0
CHILLS: 0
DIAPHORESIS: 0
STRIDOR: 0
ABDOMINAL PAIN: 0
CHEST TIGHTNESS: 0
DIZZINESS: 0
FEVER: 0
PALPITATIONS: 0
TROUBLE SWALLOWING: 0
WHEEZING: 0
LIGHT-HEADEDNESS: 0
FATIGUE: 0
COUGH: 0
SHORTNESS OF BREATH: 0

## 2019-01-26 NOTE — THERAPY
"Physical Therapy Evaluation completed.     Pt presents post NSTEMI, found to have thrombus in LAD with ruptured plaque requiring aspiration and stent, trops peak to 73.9, EF 45% via echo. Pt very active at baseline, part time , season starts in May; discussed RPE/HR goals with activity and MET level recommendations per AHA/AMA (5 METs or less). Did not wish to do the full HDR evaluation at this time but did internalize education well; will return tomorrow for follow up questions but appears interested in phase II cardiac rehab as outpt.     See \"Rehab Therapy-Acute\" Patient Summary Report for complete documentation.     "

## 2019-01-26 NOTE — PROGRESS NOTES
Assumed care of patient at bedside report from NOC RN. Updated on POC. Patient currently sleeping without signs or symptoms of discomfort or distress; on room air; up self, steady on feet with steady gait. Call light within reach. Fall precautions in place. Bed locked and in lowest position. No other needs indicated at this time.

## 2019-01-26 NOTE — DISCHARGE INSTRUCTIONS
Discharge Instructions    Discharged to home by car with self. Discharged via walking, hospital escort: Refused.  Special equipment needed: Not Applicable    Be sure to schedule a follow-up appointment with your primary care doctor or any specialists as instructed.     Discharge Plan:   Diet Plan: Discussed  Activity Level: Discussed  Confirmed Follow up Appointment: Patient to Call and Schedule Appointment  Confirmed Symptoms Management: Discussed  Medication Reconciliation Updated: Yes  Influenza Vaccine Indication: Patient Refuses    I understand that a diet low in cholesterol, fat, and sodium is recommended for good health. Unless I have been given specific instructions below for another diet, I accept this instruction as my diet prescription.   Other diet: Cardiac    Special Instructions: Diagnosis:  Acute Coronary Syndrome (ACS) is a diagnosis that encompasses cardiac-related chest pain and heart attack. ACS occurs when the blood flow to the heart muscle is severely reduced or cut off completely due to a slow process called atherosclerosis.  Atherosclerosis is a disease in which the coronary arteries become narrow from a buildup of fat, cholesterol, and other substances that combine to form plaque. If the plaque breaks, a blood clot will form and block the blood flow to the heart muscle. This lack of blood flow can cause damage or death to the heart muscle which is called a heart attack or Myocardial Infarction (MI). There are two different types of MIs:  ST Elevation Myocardial Infarction or STEMI (the most severe type of heart attack) and Non-ST Elevation Myocardial Infarction or NSTEMI.    Treatment Plan:  · Cardiac Diet  - Low fat, low salt, low cholesterol   · Cardiac Rehab  - Your doctor has ordered you a referral to [facility].  Call [phone] to schedule an appointment.  · Attend my follow-up appointment with my Cardiologist.  · Take my medications as prescribed by my doctor  · Exercise daily  · Lower  my bad cholesterol and raise my good cholesterol    Medications:  Certain medications are used to treat ACS.  Remember to always take medications as prescribed and never stop talking medications unless told by your doctor.    You have been prescribed the following medicatons:    Aspirin - Aspirin is used as a blood thinning medication and you will require this medication indefinitely.  Anti-platelet/blood thinner - Your Anti-platelet/Blood thinning medication is called ticagrelor, and is used in combination with aspirin to prevent clots from forming in your heart and/or around your stent.  Your doctor will determine how long you need to be on this medicine.  Beta-Blocker - Beta-Blocker metoprolol is used to lower blood pressure and heart rate, and/or helps your heart heal after a heart attack.  Statin - Statin atorvastatin is used to lower cholesterol.    · Is patient discharged on Warfarin / Coumadin?   No     Aspirin, ASA oral tablets  What is this medicine?  ASPIRIN (AS pir in) is a pain reliever. It is used to treat mild pain and fever. This medicine is also used as directed by a doctor to prevent and to treat heart attacks, to prevent strokes, and to treat arthritis or inflammation.  This medicine may be used for other purposes; ask your health care provider or pharmacist if you have questions.  COMMON BRAND NAME(S): Aspir-Low, Aspir-Gerda, Aspirtab, Allie Advanced Aspirin, Allie Aspirin, Allie Aspirin Extra Strength, Allie Aspirin Plus, Allie Extra Strength, Allie Extra Strength Plus, Allie Genuine Aspirin, Allie Womens Aspirin, Bufferin, Bufferin Extra Strength, Bufferin Low Dose  What should I tell my health care provider before I take this medicine?  They need to know if you have any of these conditions:  -anemia  -asthma  -bleeding problems  -child with chickenpox, the flu, or other viral infection  -diabetes  -gout  -if you frequently drink alcohol containing drinks  -kidney disease  -liver disease  -low  level of vitamin K  -lupus  -smoke tobacco  -stomach ulcers or other problems  -an unusual or allergic reaction to aspirin, tartrazine dye, other medicines, dyes, or preservatives  -pregnant or trying to get pregnant  -breast-feeding  How should I use this medicine?  Take this medicine by mouth with a glass of water. Follow the directions on the package or prescription label. You can take this medicine with or without food. If it upsets your stomach, take it with food. Do not take your medicine more often than directed.  Talk to your pediatrician regarding the use of this medicine in children. While this drug may be prescribed for children as young as 12 years of age for selected conditions, precautions do apply. Children and teenagers should not use this medicine to treat chicken pox or flu symptoms unless directed by a doctor.  Patients over 65 years old may have a stronger reaction and need a smaller dose.  Overdosage: If you think you have taken too much of this medicine contact a poison control center or emergency room at once.  NOTE: This medicine is only for you. Do not share this medicine with others.  What if I miss a dose?  If you are taking this medicine on a regular schedule and miss a dose, take it as soon as you can. If it is almost time for your next dose, take only that dose. Do not take double or extra doses.  What may interact with this medicine?  Do not take this medicine with any of the following medications:  -cidofovir  -ketorolac  -probenecid  This medicine may also interact with the following medications:  -alcohol  -alendronate  -bismuth subsalicylate  -flavocoxid  -herbal supplements like feverfew, garlic, adam, ginkgo biloba, horse chestnut  -medicines for diabetes or glaucoma like acetazolamide, methazolamide  -medicines for gout  -medicines that treat or prevent blood clots like enoxaparin, heparin, ticlopidine, warfarin  -other aspirin and aspirin-like medicines  -NSAIDs, medicines  for pain and inflammation, like ibuprofen or naproxen  -pemetrexed  -sulfinpyrazone  -varicella live vaccine  This list may not describe all possible interactions. Give your health care provider a list of all the medicines, herbs, non-prescription drugs, or dietary supplements you use. Also tell them if you smoke, drink alcohol, or use illegal drugs. Some items may interact with your medicine.  What should I watch for while using this medicine?  If you are treating yourself for pain, tell your doctor or health care professional if the pain lasts more than 10 days, if it gets worse, or if there is a new or different kind of pain. Tell your doctor if you see redness or swelling. Also, check with your doctor if you have a fever that lasts for more than 3 days. Only take this medicine to prevent heart attacks or blood clotting if prescribed by your doctor or health care professional.  Do not take aspirin or aspirin-like medicines with this medicine. Too much aspirin can be dangerous. Always read the labels carefully.  This medicine can irritate your stomach or cause bleeding problems. Do not smoke cigarettes or drink alcohol while taking this medicine. Do not lie down for 30 minutes after taking this medicine to prevent irritation to your throat.  If you are scheduled for any medical or dental procedure, tell your healthcare provider that you are taking this medicine. You may need to stop taking this medicine before the procedure.  This medicine may be used to treat migraines. If you take migraine medicines for 10 or more days a month, your migraines may get worse. Keep a diary of headache days and medicine use. Contact your healthcare professional if your migraine attacks occur more frequently.  What side effects may I notice from receiving this medicine?  Side effects that you should report to your doctor or health care professional as soon as possible:  -allergic reactions like skin rash, itching or hives, swelling  of the face, lips, or tongue  -breathing problems  -changes in hearing, ringing in the ears  -confusion  -general ill feeling or flu-like symptoms  -pain on swallowing  -redness, blistering, peeling or loosening of the skin, including inside the mouth or nose  -signs and symptoms of bleeding such as bloody or black, tarry stools; red or dark-brown urine; spitting up blood or brown material that looks like coffee grounds; red spots on the skin; unusual bruising or bleeding from the eye, gums, or nose  -trouble passing urine or change in the amount of urine  -unusually weak or tired  -yellowing of the eyes or skin  Side effects that usually do not require medical attention (report to your doctor or health care professional if they continue or are bothersome):  -diarrhea or constipation  -headache  -nausea, vomiting  -stomach gas, heartburn  This list may not describe all possible side effects. Call your doctor for medical advice about side effects. You may report side effects to FDA at 3-882-FDA-3968.  Where should I keep my medicine?  Keep out of the reach of children.  Store at room temperature between 15 and 30 degrees C (59 and 86 degrees F). Protect from heat and moisture. Do not use this medicine if it has a strong vinegar smell. Throw away any unused medicine after the expiration date.  NOTE: This sheet is a summary. It may not cover all possible information. If you have questions about this medicine, talk to your doctor, pharmacist, or health care provider.  © 2018 [employer] (2014-08-19 11:30:31)    Atorvastatin tablets  What is this medicine?  ATORVASTATIN (a TORE va sta tin) is known as a HMG-CoA reductase inhibitor or 'statin'. It lowers the level of cholesterol and triglycerides in the blood. This drug may also reduce the risk of heart attack, stroke, or other health problems in patients with risk factors for heart disease. Diet and lifestyle changes are often used with this drug.  This  medicine may be used for other purposes; ask your health care provider or pharmacist if you have questions.  COMMON BRAND NAME(S): Lipitor  What should I tell my health care provider before I take this medicine?  They need to know if you have any of these conditions:  -frequently drink alcoholic beverages  -history of stroke, TIA  -kidney disease  -liver disease  -muscle aches or weakness  -other medical condition  -an unusual or allergic reaction to atorvastatin, other medicines, foods, dyes, or preservatives  -pregnant or trying to get pregnant  -breast-feeding  How should I use this medicine?  Take this medicine by mouth with a glass of water. Follow the directions on the prescription label. You can take this medicine with or without food. Take your doses at regular intervals. Do not take your medicine more often than directed.  Talk to your pediatrician regarding the use of this medicine in children. While this drug may be prescribed for children as young as 10 years old for selected conditions, precautions do apply.  Overdosage: If you think you have taken too much of this medicine contact a poison control center or emergency room at once.  NOTE: This medicine is only for you. Do not share this medicine with others.  What if I miss a dose?  If you miss a dose, take it as soon as you can. If it is almost time for your next dose, take only that dose. Do not take double or extra doses.  What may interact with this medicine?  Do not take this medicine with any of the following medications:  -red yeast rice  -telaprevir  -telithromycin  -voriconazole  This medicine may also interact with the following medications:  -alcohol  -antiviral medicines for HIV or AIDS  -boceprevir  -certain antibiotics like clarithromycin, erythromycin, troleandomycin  -certain medicines for cholesterol like fenofibrate or gemfibrozil  -cimetidine  -clarithromycin  -colchicine  -cyclosporine  -digoxin  -female hormones, like estrogens or  progestins and birth control pills  -grapefruit juice  -medicines for fungal infections like fluconazole, itraconazole, ketoconazole  -niacin  -rifampin  -spironolactone  This list may not describe all possible interactions. Give your health care provider a list of all the medicines, herbs, non-prescription drugs, or dietary supplements you use. Also tell them if you smoke, drink alcohol, or use illegal drugs. Some items may interact with your medicine.  What should I watch for while using this medicine?  Visit your doctor or health care professional for regular check-ups. You may need regular tests to make sure your liver is working properly.  Tell your doctor or health care professional right away if you get any unexplained muscle pain, tenderness, or weakness, especially if you also have a fever and tiredness. Your doctor or health care professional may tell you to stop taking this medicine if you develop muscle problems. If your muscle problems do not go away after stopping this medicine, contact your health care professional.  This drug is only part of a total heart-health program. Your doctor or a dietician can suggest a low-cholesterol and low-fat diet to help. Avoid alcohol and smoking, and keep a proper exercise schedule.  Do not use this drug if you are pregnant or breast-feeding. Serious side effects to an unborn child or to an infant are possible. Talk to your doctor or pharmacist for more information.  This medicine may affect blood sugar levels. If you have diabetes, check with your doctor or health care professional before you change your diet or the dose of your diabetic medicine.  If you are going to have surgery tell your health care professional that you are taking this drug.  What side effects may I notice from receiving this medicine?  Side effects that you should report to your doctor or health care professional as soon as possible:  -allergic reactions like skin rash, itching or hives,  swelling of the face, lips, or tongue  -dark urine  -fever  -joint pain  -muscle cramps, pain  -redness, blistering, peeling or loosening of the skin, including inside the mouth  -trouble passing urine or change in the amount of urine  -unusually weak or tired  -yellowing of eyes or skin  Side effects that usually do not require medical attention (report to your doctor or health care professional if they continue or are bothersome):  -constipation  -heartburn  -stomach gas, pain, upset  This list may not describe all possible side effects. Call your doctor for medical advice about side effects. You may report side effects to FDA at 3-605-FDA-2314.  Where should I keep my medicine?  Keep out of the reach of children.  Store at room temperature between 20 to 25 degrees C (68 to 77 degrees F). Throw away any unused medicine after the expiration date.  NOTE: This sheet is a summary. It may not cover all possible information. If you have questions about this medicine, talk to your doctor, pharmacist, or health care provider.  © 2018 [employer] Standard (2012-11-06 09:18:24)    Metoprolol extended-release tablets  What is this medicine?  METOPROLOL (me TOE proe lole) is a beta-blocker. Beta-blockers reduce the workload on the heart and help it to beat more regularly. This medicine is used to treat high blood pressure and to prevent chest pain. It is also used to after a heart attack and to prevent an additional heart attack from occurring.  This medicine may be used for other purposes; ask your health care provider or pharmacist if you have questions.  COMMON BRAND NAME(S): toprol, Toprol XL  What should I tell my health care provider before I take this medicine?  They need to know if you have any of these conditions:  -diabetes  -heart or vessel disease like slow heart rate, worsening heart failure, heart block, sick sinus syndrome or Raynaud's disease  -kidney disease  -liver disease  -lung or breathing disease, like  asthma or emphysema  -pheochromocytoma  -thyroid disease  -an unusual or allergic reaction to metoprolol, other beta-blockers, medicines, foods, dyes, or preservatives  -pregnant or trying to get pregnant  -breast-feeding  How should I use this medicine?  Take this medicine by mouth with a glass of water. Follow the directions on the prescription label. Do not crush or chew. Take this medicine with or immediately after meals. Take your doses at regular intervals. Do not take more medicine than directed. Do not stop taking this medicine suddenly. This could lead to serious heart-related effects.  Talk to your pediatrician regarding the use of this medicine in children. While this drug may be prescribed for children as young as 6 years for selected conditions, precautions do apply.  Overdosage: If you think you have taken too much of this medicine contact a poison control center or emergency room at once.  NOTE: This medicine is only for you. Do not share this medicine with others.  What if I miss a dose?  If you miss a dose, take it as soon as you can. If it is almost time for your next dose, take only that dose. Do not take double or extra doses.  What may interact with this medicine?  This medicine may interact with the following medications:  -certain medicines for blood pressure, heart disease, irregular heart beat  -certain medicines for depression, like monoamine oxidase (MAO) inhibitors, fluoxetine, or paroxetine  -clonidine  -dobutamine  -epinephrine  -isoproterenol  -reserpine  This list may not describe all possible interactions. Give your health care provider a list of all the medicines, herbs, non-prescription drugs, or dietary supplements you use. Also tell them if you smoke, drink alcohol, or use illegal drugs. Some items may interact with your medicine.  What should I watch for while using this medicine?  Visit your doctor or health care professional for regular check ups. Contact your doctor right  away if your symptoms worsen. Check your blood pressure and pulse rate regularly. Ask your health care professional what your blood pressure and pulse rate should be, and when you should contact them.  You may get drowsy or dizzy. Do not drive, use machinery, or do anything that needs mental alertness until you know how this medicine affects you. Do not sit or stand up quickly, especially if you are an older patient. This reduces the risk of dizzy or fainting spells. Contact your doctor if these symptoms continue. Alcohol may interfere with the effect of this medicine. Avoid alcoholic drinks.  What side effects may I notice from receiving this medicine?  Side effects that you should report to your doctor or health care professional as soon as possible:  -allergic reactions like skin rash, itching or hives  -cold or numb hands or feet  -depression  -difficulty breathing  -faint  -fever with sore throat  -irregular heartbeat, chest pain  -rapid weight gain  -swollen legs or ankles  Side effects that usually do not require medical attention (report to your doctor or health care professional if they continue or are bothersome):  -anxiety or nervousness  -change in sex drive or performance  -dry skin  -headache  -nightmares or trouble sleeping  -short term memory loss  -stomach upset or diarrhea  -unusually tired  This list may not describe all possible side effects. Call your doctor for medical advice about side effects. You may report side effects to FDA at 1-520-FDA-4808.  Where should I keep my medicine?  Keep out of the reach of children.  Store at room temperature between 15 and 30 degrees C (59 and 86 degrees F). Throw away any unused medicine after the expiration date.  NOTE: This sheet is a summary. It may not cover all possible information. If you have questions about this medicine, talk to your doctor, pharmacist, or health care provider.  © 2018 [employer] (2014-08-22 14:41:37)    Ticagrelor oral  tablet  What is this medicine?  TICAGRELOR (ERICA ka GREL or) helps to prevent blood clots. This medicine is used to prevent heart attack, stroke, or other vascular events in people who have had a recent heart attack or who have severe chest pain.  This medicine may be used for other purposes; ask your health care provider or pharmacist if you have questions.  COMMON BRAND NAME(S): ENIO  What should I tell my health care provider before I take this medicine?  They need to know if you have any of these conditions:  -bleeding disorders  -bleeding in the brain  -having surgery  -history of irregular heartbeat  -history of stomach bleeding  -liver disease  -an unusual or allergic reaction to ticagrelor, other medicines, foods, dyes, or preservatives  -pregnant or trying to get pregnant  -breast-feeding  How should I use this medicine?  Take this medicine by mouth with a glass of water. Follow the directions on the prescription label. You can take it with or without food. If it upsets your stomach, take it with food. Take your medicine at regular intervals. Do not take it more often than directed. Do not stop taking except on your doctor's advice.  Talk to you pediatrician regarding the use of this medicine in children. Special care may be needed.  Overdosage: If you think you have taken too much of this medicine contact a poison control center or emergency room at once.  NOTE: This medicine is only for you. Do not share this medicine with others.  What if I miss a dose?  If you miss a dose, take it as soon as you can. If it is almost time for your next dose, take only that dose. Do not take double or extra doses.  What may interact with this medicine?  -certain antibiotics like clarithromycin and telithromycin  -certain medicines for fungal infections like itraconazole, ketoconazole, and voriconazole  -certain medicines for HIV infection like atazanavir, indinavir, nelfinavir, ritonavir, and saquinavir  -certain  medicines for seizures like carbamazepine, phenobarbital, and phenytoin  -certain medicines that treat or prevent blood clots like warfarin  -dexamethasone  -digoxin  -lovastatin  -nefazodone  -rifampin  -simvastatin  This list may not describe all possible interactions. Give your health care provider a list of all the medicines, herbs, non-prescription drugs, or dietary supplements you use. Also tell them if you smoke, drink alcohol, or use illegal drugs. Some items may interact with your medicine.  What should I watch for while using this medicine?  Visit your doctor or health care professional for regular check ups. Do not stop taking you medicine unless your doctor tells you to.  Notify your doctor or health care professional and seek emergency treatment if you develop breathing problems; changes in vision; chest pain; severe, sudden headache; pain, swelling, warmth in the leg; trouble speaking; sudden numbness or weakness of the face, arm, or leg. These can be signs that your condition has gotten worse.  If you are going to have surgery or dental work, tell your doctor or health care professional that you are taking this medicine.  You should take aspirin every day with this medicine. Do not take more than 100 mg each day. Talk to your doctor if you have questions.  What side effects may I notice from receiving this medicine?  Side effects that you should report to your doctor or health care professional as soon as possible:  -allergic reactions like skin rash, itching or hives, swelling of the face, lips, or tongue  -breathing problems  -fast or irregular heartbeat  -feeling faint or light-headed, falls  -signs and symptoms of bleeding such as bloody or black, tarry stools; red or dark-brown urine; spitting up blood or brown material that looks like coffee grounds; red spots on the skin; unusual bruising or bleeding from the eye, gums, or nose  Side effects that usually do not require medical attention (report  "to your doctor or health care professional if they continue or are bothersome):  -breast enlargement in both males and females  -diarrhea  -dizziness  -headache  -tiredness  -upset stomach  This list may not describe all possible side effects. Call your doctor for medical advice about side effects. You may report side effects to FDA at 1-500-FDA-7155.  Where should I keep my medicine?  Keep out of the reach of children.  Store at room temperature of 59 to 86 degrees F (15 to 30 degrees C). Throw away any unused medicine after the expiration date.  NOTE: This sheet is a summary. It may not cover all possible information. If you have questions about this medicine, talk to your doctor, pharmacist, or health care provider.  © 2018 [employer]/Gold Standard (2017-01-19 11:53:14)    DASH Eating Plan  DASH stands for \"Dietary Approaches to Stop Hypertension.\" The DASH eating plan is a healthy eating plan that has been shown to reduce high blood pressure (hypertension). Additional health benefits may include reducing the risk of type 2 diabetes mellitus, heart disease, and stroke. The DASH eating plan may also help with weight loss.  What do I need to know about the DASH eating plan?  For the DASH eating plan, you will follow these general guidelines:  · Choose foods with less than 150 milligrams of sodium per serving (as listed on the food label).  · Use salt-free seasonings or herbs instead of table salt or sea salt.  · Check with your health care provider or pharmacist before using salt substitutes.  · Eat lower-sodium products. These are often labeled as \"low-sodium\" or \"no salt added.\"  · Eat fresh foods. Avoid eating a lot of canned foods.  · Eat more vegetables, fruits, and low-fat dairy products.  · Choose whole grains. Look for the word \"whole\" as the first word in the ingredient list.  · Choose fish and skinless chicken or turkey more often than red meat. Limit fish, poultry, and meat to 6 oz (170 g) each " day.  · Limit sweets, desserts, sugars, and sugary drinks.  · Choose heart-healthy fats.  · Eat more home-cooked food and less restaurant, buffet, and fast food.  · Limit fried foods.  · Do not drake foods. Cook foods using methods such as baking, boiling, grilling, and broiling instead.  · When eating at a restaurant, ask that your food be prepared with less salt, or no salt if possible.  What foods can I eat?  Seek help from a dietitian for individual calorie needs.  Grains   Whole grain or whole wheat bread. Brown rice. Whole grain or whole wheat pasta. Quinoa, bulgur, and whole grain cereals. Low-sodium cereals. Corn or whole wheat flour tortillas. Whole grain cornbread. Whole grain crackers. Low-sodium crackers.  Vegetables   Fresh or frozen vegetables (raw, steamed, roasted, or grilled). Low-sodium or reduced-sodium tomato and vegetable juices. Low-sodium or reduced-sodium tomato sauce and paste. Low-sodium or reduced-sodium canned vegetables.  Fruits   All fresh, canned (in natural juice), or frozen fruits.  Meat and Other Protein Products   Ground beef (85% or leaner), grass-fed beef, or beef trimmed of fat. Skinless chicken or turkey. Ground chicken or turkey. Pork trimmed of fat. All fish and seafood. Eggs. Dried beans, peas, or lentils. Unsalted nuts and seeds. Unsalted canned beans.  Dairy   Low-fat dairy products, such as skim or 1% milk, 2% or reduced-fat cheeses, low-fat ricotta or cottage cheese, or plain low-fat yogurt. Low-sodium or reduced-sodium cheeses.  Fats and Oils   Tub margarines without trans fats. Light or reduced-fat mayonnaise and salad dressings (reduced sodium). Avocado. Safflower, olive, or canola oils. Natural peanut or almond butter.  Other   Unsalted popcorn and pretzels.  The items listed above may not be a complete list of recommended foods or beverages. Contact your dietitian for more options.   What foods are not recommended?  Grains   White bread. White pasta. White rice.  Refined cornbread. Bagels and croissants. Crackers that contain trans fat.  Vegetables   Creamed or fried vegetables. Vegetables in a cheese sauce. Regular canned vegetables. Regular canned tomato sauce and paste. Regular tomato and vegetable juices.  Fruits   Canned fruit in light or heavy syrup. Fruit juice.  Meat and Other Protein Products   Fatty cuts of meat. Ribs, chicken wings, pires, sausage, bologna, salami, chitterlings, fatback, hot dogs, bratwurst, and packaged luncheon meats. Salted nuts and seeds. Canned beans with salt.  Dairy   Whole or 2% milk, cream, half-and-half, and cream cheese. Whole-fat or sweetened yogurt. Full-fat cheeses or blue cheese. Nondairy creamers and whipped toppings. Processed cheese, cheese spreads, or cheese curds.  Condiments   Onion and garlic salt, seasoned salt, table salt, and sea salt. Canned and packaged gravies. Worcestershire sauce. Tartar sauce. Barbecue sauce. Teriyaki sauce. Soy sauce, including reduced sodium. Steak sauce. Fish sauce. Oyster sauce. Cocktail sauce. Horseradish. Ketchup and mustard. Meat flavorings and tenderizers. Bouillon cubes. Hot sauce. Tabasco sauce. Marinades. Taco seasonings. Relishes.  Fats and Oils   Butter, stick margarine, lard, shortening, ghee, and pires fat. Coconut, palm kernel, or palm oils. Regular salad dressings.  Other   Pickles and olives. Salted popcorn and pretzels.  The items listed above may not be a complete list of foods and beverages to avoid. Contact your dietitian for more information.   Where can I find more information?  National Heart, Lung, and Blood [place]: www.nhlbi.nih.gov/health/health-topics/topics/dash/  This information is not intended to replace advice given to you by your health care provider. Make sure you discuss any questions you have with your health care provider.  Document Released: 12/06/2012 Document Revised: 05/25/2017 Document Reviewed: 10/22/2014  [employer] Interactive Patient Education © 2017  [employer]    Depression / Suicide Risk    As you are discharged from this [facility] facility, it is important to learn how to keep safe from harming yourself.    Recognize the warning signs:  · Abrupt changes in personality, positive or negative- including increase in energy   · Giving away possessions  · Change in eating patterns- significant weight changes-  positive or negative  · Change in sleeping patterns- unable to sleep or sleeping all the time   · Unwillingness or inability to communicate  · Depression  · Unusual sadness, discouragement and loneliness  · Talk of wanting to die  · Neglect of personal appearance   · Rebelliousness- reckless behavior  · Withdrawal from people/activities they love  · Confusion- inability to concentrate     If you or a loved one observes any of these behaviors or has concerns about self-harm, here's what you can do:  · Talk about it- your feelings and reasons for harming yourself  · Remove any means that you might use to hurt yourself (examples: pills, rope, extension cords, firearm)  · Get professional help from the community (Mental Health, Substance Abuse, psychological counseling)  · Do not be alone:Call your Safe Contact- someone whom you trust who will be there for you.  · Call your local CRISIS HOTLINE 249-2489 or 789-875-4915  · Call your local Children's Mobile Crisis Response Team [place] (295) 679-8838 or www.[employer]  · Call the toll free National Suicide Prevention Hotlines   · National Suicide Prevention Lifeline 493-136-COJB (5114)  · National Hope Line Network 800-SUICIDE (297-2063)

## 2019-01-26 NOTE — PROGRESS NOTES
Patient discharged home. All personal belongings collected. IV access removed. Monitor removed, monitor room notified. Discharge instructions discussed. Medications reviewed; paper prescriptions given, prescriptions sent to preferred pharmacy, new medication printed education provided. Follow up appointment with PCP to be scheduled by patient; cardiology to call patient on Monday to schedule follow up. Patient ambulated off unit with all personal belongings without incident.

## 2019-01-26 NOTE — NON-PROVIDER
Internal Medicine Medical Student Note  Note Author: Sofia Childs, Student    Name Zeeshan Hewitt     1990   Age/Sex 28 y.o. male   MRN [id number]   Code Status FULL     Reason for interval visit  (Principal Problem)   Chest pain radiating to jaw    Interval Problem Daily Status Update  (problem status, last 24 hours, new history, new data )   -Cardiology NP met with pt yesterday and recommends   -Cont DAPT with ASA and brilinta    -Consider b-blocker if BP/HR can tolerate    -Outpt cardiac rehab  -PT met with this pt yesterday, will return for full eval today but agrees with cardiac rehab  -Repeat ECHO today    -Mildly reduced left ventricular systolic function. Left ventricular ejection fraction is visually estimated to be 45%.     -Abnormal septal motion. Grade I diastolic dysfunction.   -Mobile interatrial septum. Saline bubble study performed, positive for shunt, with atrial septal aneurysm.  -Discussed f/u at [facility]     Physical Exam  Constitutional: Well-developed, well-appearing  Head: Normocephalic, atraumatic  CV: RRR, palpable pulses, no MRG appreciated   Pulm: Normal respiratory effort, no wheezes/rales   Abd: Normoactive BS, NT/ND  Neuro: A&Ox4     Vitals:    19 1915 19 2031 19 0010 19 0420   BP:  145/74 123/74 112/66   Pulse:  60 86 63   Resp:  18 18 16   Temp:  36.3 °C (97.4 °F) 36.3 °C (97.3 °F) 36.1 °C (97 °F)   TempSrc:  Temporal Temporal Temporal   SpO2:  98% 96% 93%   Weight: 88.5 kg (195 lb 1.7 oz)      Height:         Body mass index is 27.99 kg/m². Weight: 88.5 kg (195 lb 1.7 oz)  Oxygen Therapy:  Pulse Oximetry: 93 %, O2 (LPM): 0, O2 Delivery: None (Room Air)    Physical Exam    Assessment/Plan  Chest pain (ACS versus myocarditis): Pt presented with burning retrosternal pain that first started after 2h basketball on  and resolved with sleep. Recurrent pain, radiating to neck on  continuing for hours after 3mi run brought this pt to ED.  Normal EKG/CXR, elevated troponin (6.87 -> 16.59 -> 74.93), ECHO demonstrating mildly reduced left ventricular systolic function, LVEF visually estimated at 45%, hypokinesis of the basal to mid anteroseptal wall, grade I diastolic dysfunction, mild concentric left ventricular hypertrophy, and borderline aneurysmal interatrial septum with less than 10 mm excursion. Repeat EKG 1/24 demonstrated LBBB and T-wave abnormality in inferior leads. Pt was taken to cath 1/24 and large thrombus was found in LAD with 70% stenosis. Aspiration thrombectomy and PCI of LAD were performed with 10% residual stenosis of LAD. Cardiology recommends 1 year of DAPT. Repeat EKG 1/25 demonstrated sinus rhythm with LBBB. Repeat ECHO today demonstrated mildly reduced left ventricular systolic function, left ventricular ejection fraction is visually estimated to be 45%, abnormal septal motion, grade I diastolic dysfunction, mobile interatrial septum, saline bubble study performed, positive for shunt, with atrial septal aneurysm.  -Cont DAPT (1 year): 81mg aspirin daily and 90mg brilinta BID  -Cont 80mg atorvastatin  -Consider starting extended release metoprolol succinate at follow up  -F/u with [facility]       Elevated troponin (due to 70% stenosis of LAD): On admit 1/23-24, troponin trended up 6.87 -> 16.59 -> 73.93 (5:13AM). Began down-trending at 1PM on 1/24 from 69.19 -> 55.06 -> 24.97 on 1/25.      Sofia Childs, MS3

## 2019-01-26 NOTE — PROGRESS NOTES
Cardiology Follow Up Progress Note    Date of Service  1/26/2019    Attending Physician  JOJO Hernandez*    Chief Complaint   Chest pain    Cardiology was consulted for chest pain and elevated troponins.     YOUNG Hewitt is a 28 y.o. male admitted 1/23/2019 with non-ST elevation myocardial infarction.  Patient presented with chest pain radiating to his neck x 1 day.     Initial EKG showed no ischemic changes and initial troponin was 6.9. Troponin peaked at 73.93 and patient underwent cardiac catheterization with a 3.5 x 12 mm Synergy drug eluting stent paced to the LAD with additional stent placed proxima to the first stent with a 3.5 x 8 mm Synergy drug eluting stent.    Patient has no significant past medical history. He has a family history of early CAD and a brother who had an MI at age 42.     Interim Events  1/26/19: Pt lying in bed in n distress. He is quite anxious to go home. He reports no CP, palpitations, or SOB. He had bit anxious.   1/25/19: Patient up ambulating without difficulty.  He denies chest pain, palpitations, shortness of breath, orthopnea, PND, lightheadedness or dizziness, and/or lower extremity edema.  No significant events of rhythm issues overnight. Tele monitor showed SR 60-89 bpm without ectopy.  Right groin site CDI without evidence of erythema, edema, or hematoma.    Telemetry: SB-SR 57-90s      Review of Systems  Review of Systems   Constitutional: Negative for chills, diaphoresis, fatigue and fever.   HENT: Negative for trouble swallowing.    Respiratory: Negative for cough, chest tightness, shortness of breath, wheezing and stridor.    Cardiovascular: Negative for chest pain, palpitations and leg swelling.   Gastrointestinal: Negative for abdominal pain.   Genitourinary: Negative for difficulty urinating and hematuria.   Skin: Negative for color change.   Neurological: Negative for dizziness and light-headedness.     Vital signs in last 24 hours  Temp:  [36.1  °C (97 °F)-36.8 °C (98.2 °F)] 36.1 °C (97 °F)  Pulse:  [60-88] 63  Resp:  [16-18] 16  BP: (112-145)/(66-84) 112/66  SpO2:  [93 %-98 %] 93 %    Physical Exam  Physical Exam   Constitutional: He is oriented to person, place, and time. He appears well-developed and well-nourished. No distress.   HENT:   Head: Normocephalic.   Eyes: Pupils are equal, round, and reactive to light.   Neck: No JVD present. No thyromegaly present.   Cardiovascular: Normal rate and regular rhythm.    No murmur heard.  Pulses:       Radial pulses are 2+ on the right side, and 2+ on the left side.        Dorsalis pedis pulses are 2+ on the right side, and 2+ on the left side.   Pulmonary/Chest: Effort normal and breath sounds normal. No respiratory distress. He has no wheezes. He has no rales. He exhibits no tenderness.   Abdominal: Soft. There is no tenderness.   Musculoskeletal: He exhibits no edema.   Neurological: He is alert and oriented to person, place, and time.   Skin: Skin is warm and dry. He is not diaphoretic. No erythema.   Right groin site soft, CDI, no signs of edema, erythema, or hematoma.   Psychiatric: He has a normal mood and affect. His behavior is normal. Judgment and thought content normal.   Nursing note and vitals reviewed.    Lab Review  Lab Results   Component Value Date/Time    WBC 9.8 01/25/2019 12:41 AM    RBC 5.57 01/25/2019 12:41 AM    HEMOGLOBIN 17.1 01/25/2019 12:41 AM    HEMATOCRIT 50.1 01/25/2019 12:41 AM    MCV 89.9 01/25/2019 12:41 AM    MCH 30.7 01/25/2019 12:41 AM    MCHC 34.1 01/25/2019 12:41 AM    MPV 9.7 01/25/2019 12:41 AM      Lab Results   Component Value Date/Time    SODIUM 138 01/25/2019 12:41 AM    POTASSIUM 3.6 01/25/2019 12:41 AM    CHLORIDE 103 01/25/2019 12:41 AM    CO2 27 01/25/2019 12:41 AM    GLUCOSE 87 01/25/2019 12:41 AM    BUN 16 01/25/2019 12:41 AM    CREATININE 1.08 01/25/2019 12:41 AM      Lab Results   Component Value Date/Time    ASTSGOT 144 (H) 01/24/2019 05:13 AM    ALTSGPT 42  01/24/2019 05:13 AM     Lab Results   Component Value Date/Time    CHOLSTRLTOT 174 01/24/2019 05:13 AM     (H) 01/24/2019 05:13 AM    HDL 33 (A) 01/24/2019 05:13 AM    TRIGLYCERIDE 153 (H) 01/24/2019 05:13 AM    TROPONINI 24.97 (H) 01/25/2019 07:14 AM       Recent Labs      01/23/19   2132   BNPBTYPENAT  22       Cardiac Imaging and Procedures Review  EKG 01/25/2019:  SINUS RHYTHM   LEFT BUNDLE BRANCH BLOCK   Compared to ECG 01/24/2019 19:13:53   No significant changes      Echocardiogram 01/24/2019:  Mildly reduced left ventricular systolic function.  Left ventricular ejection fraction is visually estimated to be 45%.  There is hypokinesis of the basal to mid anteroseptal wall.  Grade I diastolic dysfunction.  Mild concentric left ventricular hypertrophy.  Borderline aneurysmal interatrial septum with less than 10 mm excursion   is present.  No patent foramen ovale present by doppler interrogation.  No significant valve disease or flow abnormalities.   Right heart pressures are normal.   No prior study is available for comparison.     Cardiac Catheterization 01/24/2019:  Pre-operative Diagnosis:  Chest discomfort with new left bundle branch block and wall motion abnormality on echocardiography suspecting acute coronary syndrome  Post-operative Diagnosis:   Acute coronary syndrome secondary to 70% mid left anterior descending artery stenosis with large intraluminal thrombus  Findings:  aspiration thrombectomy using priority 1 catheter hoping to avoid placement of any stent small amount of thrombus was retrieved after a couple passes but some subsequent angiography still showed significant residual stenosis.  We therefore decided to perform primary stenting using a 3.5 x 12 mm Synergy drug eluting stent.  Subsequent angiography however still showed at least moderate stenosis proximal to the stent, therefore additional 3.5 x 8 mm stent was deployed was deployed at 12 mari.  Both stents were post dilated with 3.5  x 15 mm non-compliance balloon upto 14 ATMs.  After intervention, there was 10% residual stenosis in LAD with ÁNGEL III flow.    Assessment/Plan  NSTEMI  -S/P stents x2, 3.5 x 8 mm and 3.5 x 12 mm Synergy drug-eluting stents  -Troponin peaked at 73.93  -Echo indicates EF 45%  -Repeat echo pending  -Continue ASA 81 mg daily, atorvastatin 80 mg every evening  -Clopidogrel 75 mg daily, no Brilinta as it is not covered by insurance  -May consider adding beta-blocker at follow-up  -Cardiac rehab referral placed.     Hypertension  -Stable  -Patient will monitor BP and heart rate to see if beta-blocker can be started at his follow-up visit    Hyperlipidemia  -  -Continue atorvastatin 80 mg every evening and ASA 81 mg daily    Thank you for allowing me to participate in the care of this patient.  Cardiology will sign off.     No follow-up visit has been scheduled as he has [employer].  I will have my nurse arrange for follow-up visit.    Please contact me with any questions.    FRANKLYN Barney.P.[email or web address].   [facility]  [phone]

## 2019-01-26 NOTE — PROGRESS NOTES
MEDS TO BEDS  Brilinta 90mg #60 dispensed.  Pt counseling performed.  All questions answered.  Pt can verbalize dosing instructions including to take an 81mg ASA daily.   Zero copay collected (copay card)  Chata Alvarez Clinical Pharmacist

## 2019-01-26 NOTE — CARE PLAN
Problem: Safety  Goal: Will remain free from injury  Outcome: PROGRESSING AS EXPECTED  Pt mobility assessed at start of shift. Pt able to be up self, steady on feet with steady gait. No dyspnea with exertion currently. No complaints of acute pain. No complaints of dizziness. Pt educated to fall risk. Verbalized understanding    Problem: Knowledge Deficit  Goal: Knowledge of the prescribed therapeutic regimen will improve  Outcome: PROGRESSING AS EXPECTED  Discussed daily medications. Asked appropriate questions. Verbalized understanding of education provided.

## 2019-01-26 NOTE — CARE PLAN
Problem: Safety  Goal: Will remain free from falls  Outcome: PROGRESSING AS EXPECTED  Patient resting in bed with call light in reach and night light on.      Problem: Pain Management  Goal: Pain level will decrease to patient's comfort goal  Outcome: PROGRESSING AS EXPECTED  Patient advised to request prn pain medication as needed for pain management.

## 2019-01-26 NOTE — DISCHARGE SUMMARY
Internal Medicine Discharge Summary  Note Author: Love Saleem M.D.       Name Zeeshan Hewitt     1990   Age/Sex 28 y.o. male   MRN [id number]     Admit Date:  2019       Discharge Date:   2019    Service:   [facility] Internal Medicine Green Team  Attending Physician(s):   Dr. Duque       Senior Resident(s):   Dr. Luis  Jagdeep Resident(s):   Dr. Saleem  PCP: Pcp Pt States None    Primary Diagnosis:   Mid LAD stenosis with thrombosis    Secondary Diagnoses:                Principal Problem (Resolved):    Acute myocardial infarction involving left anterior descending (LAD) coronary artery (HCC) POA: Unknown  Active Problems:    Elevated AST (SGOT) POA: Unknown    Elevated troponin POA: Unknown    PFO with atrial septal aneurysm (Chronic) POA: Yes    Status post insertion of drug-eluting stent into left anterior descending artery - in setting of LAD threombus, CAD possible paradoxical embolism due to PFO (Chronic) POA: No  Resolved Problems:    JACKIE (acute kidney injury) (HCC) POA: Unknown    Hospital Summary (Brief Narrative):       This is a 27 yo M with no significant past medical history presented with exertional  Chest pain. He is s/p NSTEMI found to have large thrombus in mid LAD with ruptured plaue and about 60% stenosis and was treated with synergy (DUKE) stent on . He was appropriately started on aspirin, ticagrelor and atorvastatin post PCI. Anticoagulation an be changed to effient or plavix depending on his insurance status. Repeat ECHO on discharge showed EF 45%. Possible PFO with ASA. He will need outpatient follow with a cardiologist for PFO closure.  He will be discharged with a close follow up with PCP and a cardiologist who can consider PFO closure and starting beta blocker. Also, he need to get a US doppler bilateral to rule out DVT in the setting of PFO and thrombus.     Patient /Hospital Summary (Details -- Problem Oriented) :          * Acute myocardial  infarction involving left anterior descending (LAD) coronary artery (HCC)-resolved as of 1/26/2019   Assessment & Plan    Atypical chest pain, duration of pain is more than 30 minutes.  His pain increases after exertion which might be stable angina however he has unremarkable EKG and chest x-ray on presentation.  Has a significant family history of premature MI  -Unremarkable ESR, CRP, TSH and HbA1c.  UDS positive for opiates and cannabis.  -Elevated troponin I on presentation.  -Denies having any recent infection, concerns for pericarditis versus coronary vasospasm versus stable angina  -Echocardiogram performed in the ER: Ejection fraction 45%,hypokinesis of the basal to mid anteroseptal wall.  -Underwent coronary angiography on 1/24.  Large thrombus in mid LAD with ruptured plaue and about 60% stenosis.  DUKE stent was placed.  -Continue aspirin, ticagrelor for 1 year and atorvastatin 80 mg.  - EF 45%, PFO with ASA. May need outpatient follow for PFO closure.      PFO with atrial septal aneurysm   Assessment & Plan    On ECHO.  - May need outpatient follow up for PFO closure.     Elevated troponin   Assessment & Plan    Elevated troponin. Likely secondary to mid LAD stenosis with large thrombus.  -Troponin trending down after PCI.  Will remain elevated up to 24-48 hours due to PCI     Elevated AST (SGOT)   Assessment & Plan    Likely secondary to systemic response to acute inflammatory process   - Continue to monitor   - If fails to resolve, consider transaminitis workup     JACKIE (acute kidney injury) (HCC)-resolved as of 1/24/2019   Assessment & Plan    - Baseline creatinine 0.89 in 2015   - Cr 1.29 on admission   - IVF   - If fails to improve, consider JACKIE workup versus may be patient's new baseline       Consultants:     Cardiologist and Interventional Cardiologist.    Procedures:        PCI with DUKE stent    Imaging/ Testing:      EC-ECHOCARDIOGRAM COMPLETE W/O CONT   Final Result      EC-ECHOCARDIOGRAM  COMPLETE W/O CONT   Final Result      DX-CHEST-LIMITED (1 VIEW)   Final Result      No evidence of acute cardiopulmonary process.        Discharge Medications:         Medication Reconciliation: Completed       Medication List      START taking these medications      Instructions   aspirin 81 MG EC tablet  Start taking on:  1/27/2019   Take 1 Tab by mouth every day.  Dose:  81 mg     atorvastatin 80 MG tablet  Commonly known as:  LIPITOR   Take 1 Tab by mouth every evening.  Dose:  80 mg     metoprolol SR 25 MG Tb24  Commonly known as:  TOPROL XL   Take 0.5 Tabs by mouth every day.  Dose:  12.5 mg     ticagrelor 90 MG Tabs tablet  Commonly known as:  BRILINTA   Take 1 Tab by mouth 2 Times a Day.  Dose:  90 mg          Disposition:   Home    Diet:   Heart Healthy Diet    Activity:   As tolerated    Instructions:      Please see your PCP in 1 week.  Please take your medications as directed.  See your cardiologist in one month.      The patient was instructed to return to the ER in the event of worsening symptoms. I have counseled the patient on the importance of compliance and the patient has agreed to proceed with all medical recommendations and follow up plan indicated above.   The patient understands that all medications come with benefits and risks. Risks may include permanent injury or death and these risks can be minimized with close reassessment and monitoring.        Primary Care Provider:    Pcp Pt States None    Discharge summary faxed to primary care provider:  Completed  Copy of discharge summary given to the patient: Completed      Follow up appointment details :      PCP in one week.    Pending Studies:        None    Discharge Time (Minutes) :    >35 Time spent on discharge day patient visit, preparing discharge paperwork and arranging for patient follow up.  Hospital Course Type:  Inpatient Stay >2 midnights      Condition on Discharge   Stable  ______________________________________________________________________    Interval history/exam for day of discharge:    - The patient was evaluated at the bedside. The patient is stable without any overnight event.   - All the relevant labs and imaging were reviewed this morning.   - Zeeshan Hewitt's vitals were stable overnight and this morning.   -I discussed the plan of care for the day with the patient this morning.  -All the questions were answered this morning.    Review of Systems   Constitutional: Negative.    HENT: Negative.    Eyes: Negative.    Respiratory: Negative.    Cardiovascular: Negative for Chest pain, palpitations, orthopnea, claudication, leg swelling and PND.   Gastrointestinal: Negative.    Genitourinary: Negative.    Musculoskeletal: Negative.    Skin: Negative.    Neurological: Negative.    Endo/Heme/Allergies: Negative.    Psychiatric/Behavioral: Negative.    All other systems reviewed and are negative.    Physical Exam   Vitals:    01/26/19 1220   BP: 126/71   Pulse: 63   Resp: 16   Temp: 37 °C (98.6 °F)   SpO2: 93%     Constitutional: He is oriented to person, place, and time and well-developed, well-nourished, and in no distress. No distress.   HENT:   Head: Normocephalic and atraumatic.   Eyes: Pupils are equal, round, and reactive to light. EOM are normal.   Neck: Normal range of motion. Neck supple.   Cardiovascular: Normal rate, regular rhythm and normal heart sounds.    No murmur heard.  Pulmonary/Chest: Effort normal and breath sounds normal. No respiratory distress. He has no wheezes.   Abdominal: Soft. Bowel sounds are normal. He exhibits no distension. There is no tenderness.   Musculoskeletal: Normal range of motion. He exhibits no edema or tenderness.   Neurological: He is alert and oriented to person, place, and time.   Skin: Skin is warm and dry.   Psychiatric: Mood, memory, affect and judgment normal.   Nursing note and vitals reviewed.    Most Recent Labs:     Lab Results   Component Value Date/Time    WBC 9.8 01/25/2019 12:41 AM    RBC 5.57 01/25/2019 12:41 AM    HEMOGLOBIN 17.1 01/25/2019 12:41 AM    HEMATOCRIT 50.1 01/25/2019 12:41 AM    MCV 89.9 01/25/2019 12:41 AM    MCH 30.7 01/25/2019 12:41 AM    MCHC 34.1 01/25/2019 12:41 AM    MPV 9.7 01/25/2019 12:41 AM    NEUTSPOLYS 68.90 01/24/2019 05:00 AM    LYMPHOCYTES 19.90 (L) 01/24/2019 05:00 AM    MONOCYTES 8.70 01/24/2019 05:00 AM    EOSINOPHILS 1.80 01/24/2019 05:00 AM    BASOPHILS 0.40 01/24/2019 05:00 AM      Lab Results   Component Value Date/Time    SODIUM 138 01/25/2019 12:41 AM    POTASSIUM 3.6 01/25/2019 12:41 AM    CHLORIDE 103 01/25/2019 12:41 AM    CO2 27 01/25/2019 12:41 AM    GLUCOSE 87 01/25/2019 12:41 AM    BUN 16 01/25/2019 12:41 AM    CREATININE 1.08 01/25/2019 12:41 AM      Lab Results   Component Value Date/Time    ALTSGPT 42 01/24/2019 05:13 AM    ASTSGOT 144 (H) 01/24/2019 05:13 AM    ALKPHOSPHAT 74 01/24/2019 05:13 AM    TBILIRUBIN 0.7 01/24/2019 05:13 AM    LIPASE 32 01/23/2019 09:32 PM    ALBUMIN 4.6 01/24/2019 05:13 AM    GLOBULIN 2.6 01/24/2019 05:13 AM    INR 1.05 01/23/2019 09:32 PM     Lab Results   Component Value Date/Time    PROTHROMBTM 13.8 01/23/2019 09:32 PM    INR 1.05 01/23/2019 09:32 PM      Love Saleem M.D.

## 2019-01-30 ENCOUNTER — TELEPHONE (OUTPATIENT)
Dept: CARDIOLOGY | Facility: MEDICAL CENTER | Age: 29
End: 2019-01-30

## 2019-01-30 NOTE — TELEPHONE ENCOUNTER
Message   Received: 4 days ago   Message Contents   IVONNE Barney REL.             Can you lease make sure this man gets an appt scheduled with me. Hospital follow up. He has medical so he knows he will have to pay cash. I believe it will be about 186$. Can you check on [place].   Thanks, yamil      Contacted patient.  Discussed FV with DB.  Patient currently in [place].  Will return next week.  FV scheduled on 2/8/19.     Patient states he feels good right now. Denies any complaints.     Discussed financial burden.  Patient can afford OV and will follow up.  Patient has a weeks worth of medication.  Patient states when he gets back home he will contact pharmacy to discuss.  Explained we will also contact pharmacy to find out costs and will follow up with patient.      Message   Received: 4 days ago   Message Contents   IVONNE Barney, R.N.             Also can you please call [facility] in [place], [place] and find out the cost of Brillinta, Toprol XL and Atorvastatin? I want to make sure he can afford the medication. He has Medical and it isn't taken in [place] so his costs were very high. Please call me if there is a problem.      Contacted pharmacy in [facility] in [place].     Brilinta - requires a Prior Auth which was sent over per pharmacy  Other meds are zero co-pay      To [facility] - can you follow up on PA for Brilinta?  JULIET OLIVEIRA

## 2019-01-31 LAB — EKG IMPRESSION: NORMAL

## 2019-02-08 ENCOUNTER — OFFICE VISIT (OUTPATIENT)
Dept: CARDIOLOGY | Facility: MEDICAL CENTER | Age: 29
End: 2019-02-08

## 2019-02-08 VITALS
OXYGEN SATURATION: 92 % | HEIGHT: 70 IN | SYSTOLIC BLOOD PRESSURE: 118 MMHG | DIASTOLIC BLOOD PRESSURE: 92 MMHG | HEART RATE: 82 BPM | WEIGHT: 187 LBS | BODY MASS INDEX: 26.77 KG/M2

## 2019-02-08 DIAGNOSIS — Q21.12 PFO WITH ATRIAL SEPTAL ANEURYSM: Chronic | ICD-10-CM

## 2019-02-08 DIAGNOSIS — Z95.5 STATUS POST INSERTION OF DRUG-ELUTING STENT INTO LEFT ANTERIOR DESCENDING ARTERY: Chronic | ICD-10-CM

## 2019-02-08 DIAGNOSIS — I25.3 PFO WITH ATRIAL SEPTAL ANEURYSM: Chronic | ICD-10-CM

## 2019-02-08 PROCEDURE — 99214 OFFICE O/P EST MOD 30 MIN: CPT | Performed by: NURSE PRACTITIONER

## 2019-02-08 RX ORDER — ATORVASTATIN CALCIUM 80 MG/1
80 TABLET, FILM COATED ORAL EVERY EVENING
Qty: 90 TAB | Refills: 3 | Status: ON HOLD | OUTPATIENT
Start: 2019-02-08 | End: 2021-05-01 | Stop reason: SDUPTHER

## 2019-02-08 RX ORDER — METOPROLOL SUCCINATE 25 MG/1
12.5 TABLET, EXTENDED RELEASE ORAL DAILY
Qty: 45 TAB | Refills: 3 | Status: ON HOLD | OUTPATIENT
Start: 2019-02-08 | End: 2021-05-01

## 2019-02-08 ASSESSMENT — ENCOUNTER SYMPTOMS
VOMITING: 0
WHEEZING: 0
HEMOPTYSIS: 0
FEVER: 0
PND: 0
DIZZINESS: 0
HEADACHES: 0
COUGH: 0
ORTHOPNEA: 0
CHILLS: 0
PALPITATIONS: 0
CLAUDICATION: 0
SHORTNESS OF BREATH: 0
NAUSEA: 0
SPUTUM PRODUCTION: 0

## 2019-02-08 NOTE — PROGRESS NOTES
"Chief Complaint   Patient presents with   • Coronary Artery Disease     Hospital FV       Subjective:   Zeeshan Hewitt is a 28 y.o. male who presents today after being hospitalized 1/20 3-26/19 for chest pain and elevated troponins.  Patient was admitted for NSTEMI with chest pain which radiated to his neck for 1 day.  Initial EKG showed no ischemic changes however troponin was elevated at 6.9.  Troponin peaked at 73.93 and patient underwent cardiac catheterization.  The patient had aspiration thrombectomy, with a 3.5 x 12 mm Synergy drug-eluting stent to the LAD and additional stent placed proximally to the first stent measuring 3.5 x 8 mm Synergy drug-eluting stent.    Echocardiogram indicates EF 45%, moderate hypokinesis of the basal to mid anteroseptal wall, grade 1 diastolic dysfunction, borderline aneurysmal anterior atrial septum with less than 10 mm excursion, and likely PFO.    Patient comes in today reporting random sharp pains to his left mid abdominal region.  He also reports that he can feel \"something in my heart.\"    The patient is not overly thrilled with taking \"all this medication.\"  I have explained for now they are all extremely important and that he should continue taking them daily as directed.  I have asked him to check his blood pressure and heart rate as he may need an increase in his metoprolol due to elevated DBP.    Past Medical History:   Diagnosis Date   • PFO with atrial septal aneurysm      Past Surgical History:   Procedure Laterality Date   • RADIUS ULNA ORIF  3/17/2014    Performed by Joby Gunderson M.D. at SUR[facility]     Family History   Problem Relation Age of Onset   • Diabetes Mother    • Diabetes Father    • Diabetes Brother    • Heart Attack Brother 42   • Heart Disease Brother         pacemaker     Social History     Social History   • Marital status: Single     Spouse name: N/A   • Number of children: N/A   • Years of education: N/A     Occupational " "History   • Not on file.     Social History Main Topics   • Smoking status: Never Smoker   • Smokeless tobacco: Never Used   • Alcohol use Yes      Comment: occasionally   • Drug use: Yes      Comment: weed   • Sexual activity: Not on file     Other Topics Concern   • Not on file     Social History Narrative   • No narrative on file     No Known Allergies  Outpatient Encounter Prescriptions as of 2/8/2019   Medication Sig Dispense Refill   • aspirin EC 81 MG EC tablet Take 1 Tab by mouth every day. 30 Tab 5   • metoprolol SR (TOPROL XL) 25 MG TABLET SR 24 HR Take 0.5 Tabs by mouth every day. 15 Tab 11   • ticagrelor (BRILINTA) 90 MG Tab tablet Take 1 Tab by mouth 2 Times a Day. 60 Tab 11   • atorvastatin (LIPITOR) 80 MG tablet Take 1 Tab by mouth every evening. 30 Tab 11     No facility-administered encounter medications on file as of 2/8/2019.      Review of Systems   Constitutional: Negative for chills and fever.   Respiratory: Negative for cough, hemoptysis, sputum production, shortness of breath and wheezing.    Cardiovascular: Negative for chest pain, palpitations, orthopnea, claudication, leg swelling and PND.   Gastrointestinal: Negative for nausea and vomiting.   Neurological: Negative for dizziness and headaches.   All other systems reviewed and are negative.       Objective:   /92 (BP Location: Left arm, Patient Position: Sitting, BP Cuff Size: Adult)   Pulse 82   Ht 1.778 m (5' 10\")   Wt 84.8 kg (187 lb)   SpO2 92%   BMI 26.83 kg/m²     Physical Exam   Constitutional: He is oriented to person, place, and time. He appears well-developed and well-nourished.   HENT:   Head: Normocephalic and atraumatic.   Eyes: EOM are normal.   Neck: Neck supple.   Cardiovascular: Normal rate, regular rhythm and normal heart sounds.    No murmur heard.  Pulmonary/Chest: Effort normal and breath sounds normal.   Abdominal: Soft.   Musculoskeletal: He exhibits no edema.   Neurological: He is alert and oriented to " person, place, and time.   Skin: Skin is warm and dry.   Psychiatric: He has a normal mood and affect. His behavior is normal. Judgment and thought content normal.   Nursing note and vitals reviewed.      Assessment:     1. Status post insertion of drug-eluting stent into left anterior descending artery - in setting of LAD threombus, CAD possible paradoxical embolism due to PFO     2. PFO with atrial septal aneurysm         Medical Decision Making:  Today's Assessment / Status / Plan:   1. CAD  -Status post stent x2 to LAD  -Status post thrombectomy, possible paradoxical embolism due to PFO  -Continue ASA 81 mg, atorvastatin 80 mg daily, metoprolol SR 12.5 mg every evening, and Brilinta 90 mg twice daily  -I have ordered a cardiac rehab for this patient.  He has [employer] so I am not sure that it will be covered by insurance    2.  PFO with atrial septal aneurysm  -MRI of the brain to look for chronic thromboembolic disease  -Event monitor for 30 days to make sure there is no atrial fibrillation    I have discussed this case with Dr. Gonzalez.  He would like the patient to follow-up with him in approximately 6 weeks to go over results.

## 2019-02-21 ENCOUNTER — TELEPHONE (OUTPATIENT)
Dept: CARDIOLOGY | Facility: MEDICAL CENTER | Age: 29
End: 2019-02-21

## 2019-02-21 NOTE — TELEPHONE ENCOUNTER
"returning to work   Received: Today   Message Contents   Lianet Corona R.N.   Phone Number: [phone]             DB/valeria     Pt calling to discuss being released to return to work.       Please call [phone].      Contacted patient.  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      Explained he has a 3/22 appt with [facility].  Patient states he hasn't cancelled yet and won't cancel it until he speaks with DB first.  .      Offered to schedule an OV with DB, however patient prefers to first speak with her on the phone.        To DB - please contact patient.  Thank you.       "

## 2019-02-23 NOTE — TELEPHONE ENCOUNTER
Telephone call with Mr. Hewitt.  He is asking about whether or not he can go back to work and discussing the possibility of getting a letter stating he is okay to go back to work as a .  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    Ultimately this patient has [facility] and needs to be seen in [place].  He has been told again to seek cardiac care in [place].  His new cardiologist can request records.

## 2020-05-13 ENCOUNTER — TELEPHONE (OUTPATIENT)
Dept: CARDIOLOGY | Facility: MEDICAL CENTER | Age: 30
End: 2020-05-13

## 2020-05-13 NOTE — TELEPHONE ENCOUNTER
DB    Hello, patients job is requesting a list of his medications, and per patient some of his medication he was only suppose to take for about 6 months to let his stents heal and some medications where for a lifetime. Patient would like a call back with this information at [phone]

## 2020-05-18 ENCOUNTER — TELEPHONE (OUTPATIENT)
Dept: CARDIOLOGY | Facility: MEDICAL CENTER | Age: 30
End: 2020-05-18

## 2021-04-29 ENCOUNTER — HOSPITAL ENCOUNTER (INPATIENT)
Facility: MEDICAL CENTER | Age: 31
LOS: 2 days | DRG: 281 | End: 2021-05-01
Attending: EMERGENCY MEDICINE | Admitting: HOSPITALIST
Payer: COMMERCIAL

## 2021-04-29 ENCOUNTER — APPOINTMENT (OUTPATIENT)
Dept: RADIOLOGY | Facility: MEDICAL CENTER | Age: 31
DRG: 281 | End: 2021-04-29
Attending: EMERGENCY MEDICINE
Payer: COMMERCIAL

## 2021-04-29 PROBLEM — I21.4 NSTEMI (NON-ST ELEVATED MYOCARDIAL INFARCTION) (HCC): Status: ACTIVE | Noted: 2021-04-29

## 2021-04-29 PROBLEM — Z87.761 HISTORY OF GASTROSCHISIS: Status: ACTIVE | Noted: 2021-04-29

## 2021-04-29 LAB
ALBUMIN SERPL BCP-MCNC: 5 G/DL (ref 3.2–4.9)
ALBUMIN/GLOB SERPL: 1.6 G/DL
ALP SERPL-CCNC: 89 U/L (ref 30–99)
ALT SERPL-CCNC: 33 U/L (ref 2–50)
ANION GAP SERPL CALC-SCNC: 10 MMOL/L (ref 7–16)
APTT PPP: 32 SEC (ref 24.7–36)
AST SERPL-CCNC: 64 U/L (ref 12–45)
BASOPHILS # BLD AUTO: 0.5 % (ref 0–1.8)
BASOPHILS # BLD: 0.05 K/UL (ref 0–0.12)
BILIRUB SERPL-MCNC: 0.5 MG/DL (ref 0.1–1.5)
BUN SERPL-MCNC: 18 MG/DL (ref 8–22)
CALCIUM SERPL-MCNC: 9.8 MG/DL (ref 8.5–10.5)
CHLORIDE SERPL-SCNC: 101 MMOL/L (ref 96–112)
CO2 SERPL-SCNC: 26 MMOL/L (ref 20–33)
CREAT SERPL-MCNC: 1.03 MG/DL (ref 0.5–1.4)
EKG IMPRESSION: NORMAL
EOSINOPHIL # BLD AUTO: 0.12 K/UL (ref 0–0.51)
EOSINOPHIL NFR BLD: 1.3 % (ref 0–6.9)
ERYTHROCYTE [DISTWIDTH] IN BLOOD BY AUTOMATED COUNT: 40 FL (ref 35.9–50)
GLOBULIN SER CALC-MCNC: 3.1 G/DL (ref 1.9–3.5)
GLUCOSE SERPL-MCNC: 88 MG/DL (ref 65–99)
HCT VFR BLD AUTO: 47.3 % (ref 42–52)
HGB BLD-MCNC: 16.6 G/DL (ref 14–18)
IMM GRANULOCYTES # BLD AUTO: 0.06 K/UL (ref 0–0.11)
IMM GRANULOCYTES NFR BLD AUTO: 0.6 % (ref 0–0.9)
INR PPP: 0.96 (ref 0.87–1.13)
LYMPHOCYTES # BLD AUTO: 1.42 K/UL (ref 1–4.8)
LYMPHOCYTES NFR BLD: 15.3 % (ref 22–41)
MCH RBC QN AUTO: 31.1 PG (ref 27–33)
MCHC RBC AUTO-ENTMCNC: 35.1 G/DL (ref 33.7–35.3)
MCV RBC AUTO: 88.7 FL (ref 81.4–97.8)
MONOCYTES # BLD AUTO: 0.61 K/UL (ref 0–0.85)
MONOCYTES NFR BLD AUTO: 6.6 % (ref 0–13.4)
NEUTROPHILS # BLD AUTO: 7 K/UL (ref 1.82–7.42)
NEUTROPHILS NFR BLD: 75.7 % (ref 44–72)
NRBC # BLD AUTO: 0 K/UL
NRBC BLD-RTO: 0 /100 WBC
PLATELET # BLD AUTO: 242 K/UL (ref 164–446)
PMV BLD AUTO: 9.8 FL (ref 9–12.9)
POTASSIUM SERPL-SCNC: 4.1 MMOL/L (ref 3.6–5.5)
PROT SERPL-MCNC: 8.1 G/DL (ref 6–8.2)
PROTHROMBIN TIME: 13.1 SEC (ref 12–14.6)
RBC # BLD AUTO: 5.33 M/UL (ref 4.7–6.1)
SARS-COV+SARS-COV-2 AG RESP QL IA.RAPID: NOTDETECTED
SARS-COV-2 RNA RESP QL NAA+PROBE: NOTDETECTED
SODIUM SERPL-SCNC: 137 MMOL/L (ref 135–145)
SPECIMEN SOURCE: NORMAL
SPECIMEN SOURCE: NORMAL
TROPONIN T SERPL-MCNC: 1351 NG/L (ref 6–19)
TROPONIN T SERPL-MCNC: 333 NG/L (ref 6–19)
WBC # BLD AUTO: 9.3 K/UL (ref 4.8–10.8)

## 2021-04-29 PROCEDURE — 99223 1ST HOSP IP/OBS HIGH 75: CPT | Performed by: INTERNAL MEDICINE

## 2021-04-29 PROCEDURE — 80053 COMPREHEN METABOLIC PANEL: CPT

## 2021-04-29 PROCEDURE — 36415 COLL VENOUS BLD VENIPUNCTURE: CPT

## 2021-04-29 PROCEDURE — 87426 SARSCOV CORONAVIRUS AG IA: CPT

## 2021-04-29 PROCEDURE — 93005 ELECTROCARDIOGRAM TRACING: CPT | Performed by: EMERGENCY MEDICINE

## 2021-04-29 PROCEDURE — U0003 INFECTIOUS AGENT DETECTION BY NUCLEIC ACID (DNA OR RNA); SEVERE ACUTE RESPIRATORY SYNDROME CORONAVIRUS 2 (SARS-COV-2) (CORONAVIRUS DISEASE [COVID-19]), AMPLIFIED PROBE TECHNIQUE, MAKING USE OF HIGH THROUGHPUT TECHNOLOGIES AS DESCRIBED BY CMS-2020-01-R: HCPCS

## 2021-04-29 PROCEDURE — 71045 X-RAY EXAM CHEST 1 VIEW: CPT

## 2021-04-29 PROCEDURE — 700105 HCHG RX REV CODE 258: Performed by: INTERNAL MEDICINE

## 2021-04-29 PROCEDURE — 85730 THROMBOPLASTIN TIME PARTIAL: CPT

## 2021-04-29 PROCEDURE — A9270 NON-COVERED ITEM OR SERVICE: HCPCS | Performed by: HOSPITALIST

## 2021-04-29 PROCEDURE — 99223 1ST HOSP IP/OBS HIGH 75: CPT | Performed by: HOSPITALIST

## 2021-04-29 PROCEDURE — 84484 ASSAY OF TROPONIN QUANT: CPT

## 2021-04-29 PROCEDURE — 85610 PROTHROMBIN TIME: CPT

## 2021-04-29 PROCEDURE — A9270 NON-COVERED ITEM OR SERVICE: HCPCS | Performed by: INTERNAL MEDICINE

## 2021-04-29 PROCEDURE — 700111 HCHG RX REV CODE 636 W/ 250 OVERRIDE (IP): Performed by: HOSPITALIST

## 2021-04-29 PROCEDURE — 700102 HCHG RX REV CODE 250 W/ 637 OVERRIDE(OP): Performed by: HOSPITALIST

## 2021-04-29 PROCEDURE — C9803 HOPD COVID-19 SPEC COLLECT: HCPCS | Performed by: EMERGENCY MEDICINE

## 2021-04-29 PROCEDURE — 700102 HCHG RX REV CODE 250 W/ 637 OVERRIDE(OP): Performed by: INTERNAL MEDICINE

## 2021-04-29 PROCEDURE — 770020 HCHG ROOM/CARE - TELE (206)

## 2021-04-29 PROCEDURE — 85025 COMPLETE CBC W/AUTO DIFF WBC: CPT

## 2021-04-29 PROCEDURE — 93005 ELECTROCARDIOGRAM TRACING: CPT

## 2021-04-29 PROCEDURE — U0005 INFEC AGEN DETEC AMPLI PROBE: HCPCS

## 2021-04-29 PROCEDURE — 99285 EMERGENCY DEPT VISIT HI MDM: CPT

## 2021-04-29 RX ORDER — HYDROMORPHONE HYDROCHLORIDE 1 MG/ML
0.25 INJECTION, SOLUTION INTRAMUSCULAR; INTRAVENOUS; SUBCUTANEOUS
Status: DISCONTINUED | OUTPATIENT
Start: 2021-04-29 | End: 2021-05-01 | Stop reason: HOSPADM

## 2021-04-29 RX ORDER — OXYCODONE HYDROCHLORIDE 5 MG/1
5 TABLET ORAL
Status: DISCONTINUED | OUTPATIENT
Start: 2021-04-29 | End: 2021-05-01 | Stop reason: HOSPADM

## 2021-04-29 RX ORDER — ONDANSETRON 4 MG/1
4 TABLET, ORALLY DISINTEGRATING ORAL EVERY 4 HOURS PRN
Status: DISCONTINUED | OUTPATIENT
Start: 2021-04-29 | End: 2021-05-01 | Stop reason: HOSPADM

## 2021-04-29 RX ORDER — ASPIRIN 81 MG/1
324 TABLET, CHEWABLE ORAL ONCE
Status: COMPLETED | OUTPATIENT
Start: 2021-04-29 | End: 2021-04-29

## 2021-04-29 RX ORDER — ONDANSETRON 2 MG/ML
4 INJECTION INTRAMUSCULAR; INTRAVENOUS EVERY 4 HOURS PRN
Status: DISCONTINUED | OUTPATIENT
Start: 2021-04-29 | End: 2021-05-01 | Stop reason: HOSPADM

## 2021-04-29 RX ORDER — PROCHLORPERAZINE EDISYLATE 5 MG/ML
5-10 INJECTION INTRAMUSCULAR; INTRAVENOUS EVERY 4 HOURS PRN
Status: DISCONTINUED | OUTPATIENT
Start: 2021-04-29 | End: 2021-05-01 | Stop reason: HOSPADM

## 2021-04-29 RX ORDER — CLOPIDOGREL BISULFATE 75 MG/1
300 TABLET ORAL ONCE
Status: COMPLETED | OUTPATIENT
Start: 2021-04-29 | End: 2021-04-29

## 2021-04-29 RX ORDER — ACETAMINOPHEN 325 MG/1
650 TABLET ORAL EVERY 6 HOURS PRN
Status: DISCONTINUED | OUTPATIENT
Start: 2021-04-29 | End: 2021-05-01 | Stop reason: HOSPADM

## 2021-04-29 RX ORDER — CLOPIDOGREL BISULFATE 75 MG/1
75 TABLET ORAL DAILY
Status: DISCONTINUED | OUTPATIENT
Start: 2021-04-30 | End: 2021-05-01 | Stop reason: HOSPADM

## 2021-04-29 RX ORDER — PROMETHAZINE HYDROCHLORIDE 25 MG/1
12.5-25 SUPPOSITORY RECTAL EVERY 4 HOURS PRN
Status: DISCONTINUED | OUTPATIENT
Start: 2021-04-29 | End: 2021-05-01 | Stop reason: HOSPADM

## 2021-04-29 RX ORDER — SODIUM CHLORIDE 9 MG/ML
INJECTION, SOLUTION INTRAVENOUS CONTINUOUS
Status: DISCONTINUED | OUTPATIENT
Start: 2021-04-29 | End: 2021-05-01 | Stop reason: HOSPADM

## 2021-04-29 RX ORDER — SODIUM CHLORIDE 9 MG/ML
INJECTION, SOLUTION INTRAVENOUS CONTINUOUS
Status: DISCONTINUED | OUTPATIENT
Start: 2021-04-29 | End: 2021-05-01

## 2021-04-29 RX ORDER — ACETAMINOPHEN 500 MG
1000 TABLET ORAL
COMMUNITY

## 2021-04-29 RX ORDER — OXYCODONE HYDROCHLORIDE 5 MG/1
2.5 TABLET ORAL
Status: DISCONTINUED | OUTPATIENT
Start: 2021-04-29 | End: 2021-05-01 | Stop reason: HOSPADM

## 2021-04-29 RX ORDER — HEPARIN SODIUM 5000 [USP'U]/ML
5000 INJECTION, SOLUTION INTRAVENOUS; SUBCUTANEOUS EVERY 8 HOURS
Status: DISCONTINUED | OUTPATIENT
Start: 2021-04-29 | End: 2021-05-01 | Stop reason: HOSPADM

## 2021-04-29 RX ORDER — ROSUVASTATIN CALCIUM 20 MG/1
20 TABLET, COATED ORAL EVERY EVENING
Status: DISCONTINUED | OUTPATIENT
Start: 2021-04-29 | End: 2021-05-01 | Stop reason: HOSPADM

## 2021-04-29 RX ORDER — PROMETHAZINE HYDROCHLORIDE 25 MG/1
12.5-25 TABLET ORAL EVERY 4 HOURS PRN
Status: DISCONTINUED | OUTPATIENT
Start: 2021-04-29 | End: 2021-05-01 | Stop reason: HOSPADM

## 2021-04-29 RX ADMIN — HEPARIN SODIUM 5000 UNITS: 5000 INJECTION, SOLUTION INTRAVENOUS; SUBCUTANEOUS at 21:22

## 2021-04-29 RX ADMIN — ASPIRIN 324 MG: 81 TABLET, CHEWABLE ORAL at 18:12

## 2021-04-29 RX ADMIN — CLOPIDOGREL BISULFATE 300 MG: 75 TABLET ORAL at 20:28

## 2021-04-29 RX ADMIN — NITROGLYCERIN 0.5 INCH: 20 OINTMENT TOPICAL at 18:32

## 2021-04-29 RX ADMIN — OXYCODONE 5 MG: 5 TABLET ORAL at 21:21

## 2021-04-29 RX ADMIN — ROSUVASTATIN CALCIUM 20 MG: 20 TABLET, FILM COATED ORAL at 18:32

## 2021-04-29 RX ADMIN — SODIUM CHLORIDE: 9 INJECTION, SOLUTION INTRAVENOUS at 20:28

## 2021-04-29 ASSESSMENT — PATIENT HEALTH QUESTIONNAIRE - PHQ9
SUM OF ALL RESPONSES TO PHQ9 QUESTIONS 1 AND 2: 0
2. FEELING DOWN, DEPRESSED, IRRITABLE, OR HOPELESS: NOT AT ALL
1. LITTLE INTEREST OR PLEASURE IN DOING THINGS: NOT AT ALL

## 2021-04-29 ASSESSMENT — ENCOUNTER SYMPTOMS
RESPIRATORY NEGATIVE: 1
BRUISES/BLEEDS EASILY: 0
FEVER: 0
ABDOMINAL PAIN: 0
NEUROLOGICAL NEGATIVE: 1

## 2021-04-29 ASSESSMENT — FIBROSIS 4 INDEX: FIB4 SCORE: 1.38

## 2021-04-29 ASSESSMENT — PAIN DESCRIPTION - PAIN TYPE: TYPE: ACUTE PAIN

## 2021-04-29 NOTE — ED TRIAGE NOTES
Pt amb to triage. EKG complete.  Chief Complaint   Patient presents with   • Chest Pain   • Jaw Pain     X2d. Hx of 2 cardiac stents placed in 2019. Pt states this episode feels similar.

## 2021-04-30 ENCOUNTER — APPOINTMENT (OUTPATIENT)
Dept: CARDIOLOGY | Facility: MEDICAL CENTER | Age: 31
DRG: 281 | End: 2021-04-30
Attending: INTERNAL MEDICINE
Payer: COMMERCIAL

## 2021-04-30 LAB
APTT PPP: 32.6 SEC (ref 24.7–36)
BASOPHILS # BLD AUTO: 0.6 % (ref 0–1.8)
BASOPHILS # BLD: 0.05 K/UL (ref 0–0.12)
CHOLEST SERPL-MCNC: 171 MG/DL (ref 100–199)
EOSINOPHIL # BLD AUTO: 0.23 K/UL (ref 0–0.51)
EOSINOPHIL NFR BLD: 3 % (ref 0–6.9)
ERYTHROCYTE [DISTWIDTH] IN BLOOD BY AUTOMATED COUNT: 41.2 FL (ref 35.9–50)
HCT VFR BLD AUTO: 44.7 % (ref 42–52)
HCYS SERPL-SCNC: 8.19 UMOL/L
HDLC SERPL-MCNC: 30 MG/DL
HGB BLD-MCNC: 15.4 G/DL (ref 14–18)
IMM GRANULOCYTES # BLD AUTO: 0.03 K/UL (ref 0–0.11)
IMM GRANULOCYTES NFR BLD AUTO: 0.4 % (ref 0–0.9)
INR PPP: 0.96 (ref 0.87–1.13)
LDLC SERPL CALC-MCNC: 100 MG/DL
LYMPHOCYTES # BLD AUTO: 1.73 K/UL (ref 1–4.8)
LYMPHOCYTES NFR BLD: 22.4 % (ref 22–41)
MCH RBC QN AUTO: 31.1 PG (ref 27–33)
MCHC RBC AUTO-ENTMCNC: 34.5 G/DL (ref 33.7–35.3)
MCV RBC AUTO: 90.3 FL (ref 81.4–97.8)
MONOCYTES # BLD AUTO: 0.66 K/UL (ref 0–0.85)
MONOCYTES NFR BLD AUTO: 8.5 % (ref 0–13.4)
NEUTROPHILS # BLD AUTO: 5.04 K/UL (ref 1.82–7.42)
NEUTROPHILS NFR BLD: 65.1 % (ref 44–72)
NRBC # BLD AUTO: 0 K/UL
NRBC BLD-RTO: 0 /100 WBC
PLATELET # BLD AUTO: 211 K/UL (ref 164–446)
PMV BLD AUTO: 9.9 FL (ref 9–12.9)
PROTHROMBIN TIME: 13.1 SEC (ref 12–14.6)
RBC # BLD AUTO: 4.95 M/UL (ref 4.7–6.1)
TRIGL SERPL-MCNC: 206 MG/DL (ref 0–149)
TROPONIN T SERPL-MCNC: 2093 NG/L (ref 6–19)
UFH PPP CHRO-ACNC: <0.1 IU/ML
WBC # BLD AUTO: 7.7 K/UL (ref 4.8–10.8)

## 2021-04-30 PROCEDURE — 700111 HCHG RX REV CODE 636 W/ 250 OVERRIDE (IP): Performed by: HOSPITALIST

## 2021-04-30 PROCEDURE — 84484 ASSAY OF TROPONIN QUANT: CPT

## 2021-04-30 PROCEDURE — A9270 NON-COVERED ITEM OR SERVICE: HCPCS | Performed by: INTERNAL MEDICINE

## 2021-04-30 PROCEDURE — 80061 LIPID PANEL: CPT

## 2021-04-30 PROCEDURE — 700102 HCHG RX REV CODE 250 W/ 637 OVERRIDE(OP): Performed by: INTERNAL MEDICINE

## 2021-04-30 PROCEDURE — 99233 SBSQ HOSP IP/OBS HIGH 50: CPT | Performed by: INTERNAL MEDICINE

## 2021-04-30 PROCEDURE — 85610 PROTHROMBIN TIME: CPT

## 2021-04-30 PROCEDURE — 83090 ASSAY OF HOMOCYSTEINE: CPT

## 2021-04-30 PROCEDURE — 85730 THROMBOPLASTIN TIME PARTIAL: CPT

## 2021-04-30 PROCEDURE — 85520 HEPARIN ASSAY: CPT

## 2021-04-30 PROCEDURE — 85025 COMPLETE CBC W/AUTO DIFF WBC: CPT

## 2021-04-30 PROCEDURE — 770020 HCHG ROOM/CARE - TELE (206)

## 2021-04-30 PROCEDURE — 36415 COLL VENOUS BLD VENIPUNCTURE: CPT

## 2021-04-30 RX ADMIN — ROSUVASTATIN CALCIUM 20 MG: 20 TABLET, FILM COATED ORAL at 17:30

## 2021-04-30 RX ADMIN — HEPARIN SODIUM 5000 UNITS: 5000 INJECTION, SOLUTION INTRAVENOUS; SUBCUTANEOUS at 23:10

## 2021-04-30 RX ADMIN — ASPIRIN 81 MG: 81 TABLET, COATED ORAL at 05:06

## 2021-04-30 RX ADMIN — NITROGLYCERIN 0.5 INCH: 20 OINTMENT TOPICAL at 23:10

## 2021-04-30 RX ADMIN — NITROGLYCERIN 0.5 INCH: 20 OINTMENT TOPICAL at 05:07

## 2021-04-30 RX ADMIN — NITROGLYCERIN 0.5 INCH: 20 OINTMENT TOPICAL at 17:30

## 2021-04-30 RX ADMIN — HEPARIN SODIUM 5000 UNITS: 5000 INJECTION, SOLUTION INTRAVENOUS; SUBCUTANEOUS at 05:06

## 2021-04-30 RX ADMIN — CLOPIDOGREL BISULFATE 75 MG: 75 TABLET ORAL at 05:06

## 2021-04-30 RX ADMIN — NITROGLYCERIN 0.5 INCH: 20 OINTMENT TOPICAL at 00:30

## 2021-04-30 RX ADMIN — METOPROLOL TARTRATE 25 MG: 25 TABLET, FILM COATED ORAL at 18:30

## 2021-04-30 ASSESSMENT — ENCOUNTER SYMPTOMS
POLYDIPSIA: 0
HEADACHES: 0
EYE DISCHARGE: 0
POLYPHAGIA: 0
CONFUSION: 0
JOINT SWELLING: 0
DIZZINESS: 0
INSOMNIA: 0
CHILLS: 0
FOCAL WEAKNESS: 0
NECK PAIN: 0
SHORTNESS OF BREATH: 0
HEADACHES: 1
COLOR CHANGE: 0
ABDOMINAL PAIN: 0
TREMORS: 0
EYE REDNESS: 0
EYE ITCHING: 0
DIARRHEA: 0
COUGH: 0
WEIGHT LOSS: 0
MYALGIAS: 0
BRUISES/BLEEDS EASILY: 0
NERVOUS/ANXIOUS: 0
WEAKNESS: 0
FEVER: 0
PND: 0
ABDOMINAL DISTENTION: 0
ACTIVITY CHANGE: 0
AGITATION: 0
SORE THROAT: 0
EYE PAIN: 0
VOMITING: 0
DECREASED CONCENTRATION: 0
DOUBLE VISION: 0
LIGHT-HEADEDNESS: 0
HALLUCINATIONS: 0
NUMBNESS: 0
SEIZURES: 0
FLANK PAIN: 0
CHOKING: 0
HEARTBURN: 0
STRIDOR: 0
APPETITE CHANGE: 0
DEPRESSION: 0
CHEST TIGHTNESS: 0
ADENOPATHY: 0
BACK PAIN: 0
DIAPHORESIS: 0
PALPITATIONS: 0
ORTHOPNEA: 0
SPUTUM PRODUCTION: 0
CONSTIPATION: 0
SPEECH DIFFICULTY: 0
NAUSEA: 0
BLURRED VISION: 0

## 2021-04-30 NOTE — PROGRESS NOTES
[facility] Medicine Daily Progress Note    Date of Service  4/30/2021    Chief Complaint  30 y.o. male admitted 4/29/2021 with chest pain    Hospital Course  This is a 30 y.o. male with history of non-ST elevation MI in January 2019, angio showing LAD thrombus status post PCI with stent, return of unstable angina/chest pain or riding his bike 2 days ago.  Reported seems similar to prior event.  Yesterday pain resolving.  Today no pain.  No fevers or chills.  No nausea or vomiting.     Troponin 333, cardiology consulted.  Aspirin given, n.p.o. at midnight plan is for cath in the morning.  Labs reviewed within normal limits outside of troponin.  Vitals stable.       Interval Problem Update  The patient denies any chest pain today.  But complained of bilateral headache after nitroglycerin.  His troponin went up to over 2000.  Likely will need angiogram    Consultants/Specialty  Cardiology    Code Status  Full Code    Disposition  Remain on alcohol    Review of Systems  Review of Systems   Constitutional: Negative for chills, fever and weight loss.   HENT: Negative for congestion and nosebleeds.    Eyes: Negative for blurred vision, pain, discharge and redness.   Respiratory: Negative for cough, sputum production, shortness of breath and stridor.    Cardiovascular: Positive for chest pain. Negative for palpitations and orthopnea.   Gastrointestinal: Negative for abdominal pain, diarrhea, heartburn, nausea and vomiting.   Genitourinary: Negative for dysuria, frequency and urgency.   Musculoskeletal: Negative for back pain, myalgias and neck pain.   Skin: Negative for itching and rash.   Neurological: Positive for headaches. Negative for dizziness, focal weakness and seizures.   Psychiatric/Behavioral: Negative for depression. The patient is not nervous/anxious and does not have insomnia.         Physical Exam  Temp:  [35.8 °C (96.4 °F)-36.8 °C (98.2 °F)] 35.8 °C (96.4 °F)  Pulse:  [61-94] 72  Resp:  [12-29] 16  BP:  (105-147)/(58-98) 105/58  SpO2:  [92 %-97 %] 94 %    Physical Exam  Vitals reviewed.   Constitutional:       General: He is not in acute distress.     Appearance: Normal appearance.   HENT:      Head: Normocephalic and atraumatic.      Nose: No congestion or rhinorrhea.   Eyes:      Extraocular Movements: Extraocular movements intact.      Pupils: Pupils are equal, round, and reactive to light.   Cardiovascular:      Rate and Rhythm: Normal rate and regular rhythm.      Pulses: Normal pulses.   Pulmonary:      Effort: Pulmonary effort is normal. No respiratory distress.      Breath sounds: Normal breath sounds.   Abdominal:      General: Bowel sounds are normal. There is no distension.      Palpations: Abdomen is soft.      Tenderness: There is no abdominal tenderness.   Musculoskeletal:         General: No swelling or tenderness.      Cervical back: Normal range of motion and neck supple.   Skin:     General: Skin is warm.      Findings: No erythema.   Neurological:      General: No focal deficit present.      Mental Status: He is alert and oriented to person, place, and time.         Fluids    Intake/Output Summary (Last 24 hours) at 4/30/2021 0810  Last data filed at 4/30/2021 0600  Gross per 24 hour   Intake 953.33 ml   Output --   Net 953.33 ml       Laboratory  Recent Labs     04/29/21  1551 04/30/21  0422   WBC 9.3 7.7   RBC 5.33 4.95   HEMOGLOBIN 16.6 15.4   HEMATOCRIT 47.3 44.7   MCV 88.7 90.3   MCH 31.1 31.1   MCHC 35.1 34.5   RDW 40.0 41.2   PLATELETCT 242 211   MPV 9.8 9.9     Recent Labs     04/29/21  1551   SODIUM 137   POTASSIUM 4.1   CHLORIDE 101   CO2 26   GLUCOSE 88   BUN 18   CREATININE 1.03   CALCIUM 9.8     Recent Labs     04/29/21  1551 04/30/21  0422   APTT 32.0 32.6   INR 0.96 0.96         Recent Labs     04/30/21  0422   TRIGLYCERIDE 206*   HDL 30*   *       Imaging  DX-CHEST-PORTABLE (1 VIEW)   Final Result      1.  There is no acute cardiopulmonary process.      CL-LEFT HEART  CATHETERIZATION WITH POSSIBLE INTERVENTION    (Results Pending)        Assessment/Plan  * NSTEMI (non-ST elevated myocardial infarction) (HCC)  Assessment & Plan  Trop trending up  Patient denied any chest pain  Asa, plavix, statin, nitroglycerin  Card consulted  Likely angiogram    Status post insertion of drug-eluting stent into left anterior descending artery - in setting of LAD threombus, CAD possible paradoxical embolism due to PFO- (present on admission)  Assessment & Plan  Previous LAD lesion targeted with stent  Card following: will likely need cath    PFO with atrial septal aneurysm- (present on admission)  Assessment & Plan  History of    Elevated troponin- (present on admission)  Assessment & Plan  Related to NSTEMI       VTE prophylaxis: heparin

## 2021-04-30 NOTE — ED PROVIDER NOTES
ED Provider Note    CHIEF COMPLAINT  Chief Complaint   Patient presents with   • Chest Pain   • Jaw Pain       HPI  Zeeshan Hewitt is a 30 y.o. male who presents for evaluation of chest discomfort.  The patient has a history of a prior LAD STEMI 2 years ago was treated with a stent.  He stopped taking any Plavix and or any other medications a year or 2 ago.  He was out riding his bike the other day and had some substernal chest pain and was more fatigued and winded than usual.  He finally decided to come in for evaluation.  On arrival he reported some mild pain in the chest with radiation to the jaw.  He denies high fevers chills.  No leg swelling or hemoptysis.  He apparently is followed with some sort of cardiologist in [facility].      REVIEW OF SYSTEMS  See HPI for further details.  No high fevers chills night sweats weight loss all other systems are negative.     PAST MEDICAL HISTORY  Past Medical History:   Diagnosis Date   • PFO with atrial septal aneurysm        FAMILY HISTORY  Noncontributory    SOCIAL HISTORY  Social History     Socioeconomic History   • Marital status: Single     Spouse name: Not on file   • Number of children: Not on file   • Years of education: Not on file   • Highest education level: Not on file   Occupational History   • Not on file   Tobacco Use   • Smoking status: Never Smoker   • Smokeless tobacco: Never Used   Substance and Sexual Activity   • Alcohol use: Yes     Comment: occasionally   • Drug use: Yes     Comment: weed   • Sexual activity: Not on file   Other Topics Concern   • Not on file   Social History Narrative   • Not on file     Social Determinants of Health     Financial Resource Strain:    • Difficulty of Paying Living Expenses:    Food Insecurity:    • Worried About Running Out of Food in the Last Year:    • Ran Out of Food in the Last Year:    Transportation Needs:    • Lack of Transportation (Medical):    • Lack of Transportation (Non-Medical):   "  Physical Activity:    • Days of Exercise per Week:    • Minutes of Exercise per Session:    Stress:    • Feeling of Stress :    Social Connections:    • Frequency of Communication with Friends and Family:    • Frequency of Social Gatherings with Friends and Family:    • Attends Jehov[employer] witness Services:    • Active Member of Clubs or Organizations:    • Attends Club or Organization Meetings:    • Marital Status:    Intimate Partner Violence:    • Fear of Current or Ex-Partner:    • Emotionally Abused:    • Physically Abused:    • Sexually Abused:    No tobacco use    SURGICAL HISTORY  Past Surgical History:   Procedure Laterality Date   • RADIUS ULNA ORIF  3/17/2014    Performed by Joby Gunderson M.D. at SURGERY [facility]       CURRENT MEDICATIONS  Home Medications     Reviewed by Yang Nj R.N. (Registered Nurse) on 04/29/21 at 1500  Med List Status: Partial   Medication Last Dose Status   aspirin EC 81 MG EC tablet  Active   atorvastatin (LIPITOR) 80 MG tablet  Active   metoprolol SR (TOPROL XL) 25 MG TABLET SR 24 HR  Active   ticagrelor (BRILINTA) 90 MG Tab tablet  Active                ALLERGIES  No Known Allergies    PHYSICAL EXAM  VITAL SIGNS: /72   Pulse 94   Temp 36.8 °C (98.2 °F) (Temporal)   Resp 18   Ht 1.753 m (5' 9\")   Wt 81 kg (178 lb 9.2 oz)   SpO2 95%   BMI 26.37 kg/m²       Constitutional: Well developed, Well nourished, No acute distress, Non-toxic appearance.   HENT: Normocephalic, Atraumatic, Bilateral external ears normal, Oropharynx moist, No oral exudates, Nose normal.   Eyes: PERRLA, EOMI, Conjunctiva normal, No discharge.   Neck: Normal range of motion, No tenderness, Supple, No stridor.   Cardiovascular: Normal heart rate, Normal rhythm, No murmurs, No rubs, No gallops.   Thorax & Lungs: Normal breath sounds, No respiratory distress, No wheezing, No chest tenderness.   Abdomen: Bowel sounds normal, Soft, No tenderness, No masses, No pulsatile masses.   Skin: Warm, " Dry, No erythema, No rash.   Extremities: Intact distal pulses, No edema, No tenderness, No cyanosis, No clubbing.   Neurologic: Alert & oriented x 3, Normal motor function, Normal sensory function, No focal deficits noted.   Psychiatric: Affect normal, Judgment normal, Mood normal.     Results for orders placed or performed during the hospital encounter of 04/29/21   CBC with Differential   Result Value Ref Range    WBC 9.3 4.8 - 10.8 K/uL    RBC 5.33 4.70 - 6.10 M/uL    Hemoglobin 16.6 14.0 - 18.0 g/dL    Hematocrit 47.3 42.0 - 52.0 %    MCV 88.7 81.4 - 97.8 fL    MCH 31.1 27.0 - 33.0 pg    MCHC 35.1 33.7 - 35.3 g/dL    RDW 40.0 35.9 - 50.0 fL    Platelet Count 242 164 - 446 K/uL    MPV 9.8 9.0 - 12.9 fL    Neutrophils-Polys 75.70 (H) 44.00 - 72.00 %    Lymphocytes 15.30 (L) 22.00 - 41.00 %    Monocytes 6.60 0.00 - 13.40 %    Eosinophils 1.30 0.00 - 6.90 %    Basophils 0.50 0.00 - 1.80 %    Immature Granulocytes 0.60 0.00 - 0.90 %    Nucleated RBC 0.00 /100 WBC    Neutrophils (Absolute) 7.00 1.82 - 7.42 K/uL    Lymphs (Absolute) 1.42 1.00 - 4.80 K/uL    Monos (Absolute) 0.61 0.00 - 0.85 K/uL    Eos (Absolute) 0.12 0.00 - 0.51 K/uL    Baso (Absolute) 0.05 0.00 - 0.12 K/uL    Immature Granulocytes (abs) 0.06 0.00 - 0.11 K/uL    NRBC (Absolute) 0.00 K/uL   Complete Metabolic Panel (CMP)   Result Value Ref Range    Sodium 137 135 - 145 mmol/L    Potassium 4.1 3.6 - 5.5 mmol/L    Chloride 101 96 - 112 mmol/L    Co2 26 20 - 33 mmol/L    Anion Gap 10.0 7.0 - 16.0    Glucose 88 65 - 99 mg/dL    Bun 18 8 - 22 mg/dL    Creatinine 1.03 0.50 - 1.40 mg/dL    Calcium 9.8 8.5 - 10.5 mg/dL    AST(SGOT) 64 (H) 12 - 45 U/L    ALT(SGPT) 33 2 - 50 U/L    Alkaline Phosphatase 89 30 - 99 U/L    Total Bilirubin 0.5 0.1 - 1.5 mg/dL    Albumin 5.0 (H) 3.2 - 4.9 g/dL    Total Protein 8.1 6.0 - 8.2 g/dL    Globulin 3.1 1.9 - 3.5 g/dL    A-G Ratio 1.6 g/dL   Troponin   Result Value Ref Range    Troponin T 333 (H) 6 - 19 ng/L   ESTIMATED GFR    Result Value Ref Range    GFR If African American >60 >60 mL/min/1.73 m 2    GFR If Non African American >60 >60 mL/min/1.73 m 2   EKG   Result Value Ref Range    Report       [facility] Emergency Dept.    Test Date:  2021  Pt Name:    RUTH NGUYEN              Department: ER  MRN:        [id number]                      Room:  Gender:     Male                         Technician: [id number]  :        1990                   Requested By:ER TRIAGE PROTOCOL  Order #:    [id number]                    Reading MD:    Measurements  Intervals                                Axis  Rate:       93                           P:          53  HI:         160                          QRS:        65  QRSD:       68                           T:          8  QT:         344  QTc:        428    Interpretive Statements  SINUS RHYTHM  Compared to ECG 2019 10:45:53  Left bundle-branch block no longer present       ED interpretation by me rate 93 sinus rhythm no acute ST segment elevation or depression no pathological T wave inversions    COURSE & MEDICAL DECISION MAKING  Pertinent Labs & Imaging studies reviewed. (See chart for details)  Consultation with Dr. Ridley with cardiology has been obtained.  The patient likely had a cardiac event 2 days ago related to exertion.  His troponin is profoundly elevated.  He does not actively have chest pain he does get chest pain with exertion however.  Patient will be admitted to telemetry with likely heart catheterization in the morning    FINAL IMPRESSION  1.  Non-ST segment elevation myocardial infarction         Electronically signed by: Mauricio Ocasio M.D., 2021 5:32 PM

## 2021-04-30 NOTE — H&P
Hospital Medicine History & Physical Note    Date of Service  4/29/2021    PCP: Pcp Pt States None    CC: Chest pain    HPI:   This is a 30 y.o. male with history of non-ST elevation MI in January 2019, angio showing LAD thrombus status post PCI with stent, return of unstable angina/chest pain or riding his bike 2 days ago.  Reported seems similar to prior event.  Yesterday pain resolving.  Today no pain.  No fevers or chills.  No nausea or vomiting.    Troponin 333, cardiology consulted.  Aspirin given, n.p.o. at midnight plan is for cath in the morning.  Labs reviewed within normal limits outside of troponin.  Vitals stable.    I discussed this patient's case with Dr Orourke of the emergency department.     EKG Interpretation, sinus rhythm nonischemic    Radiology personally reviewed.  On chest x-ray findings of:  Negative no acute process    Consultants:   Cardiology    ROS: As above. The remainder of a complete review of systems is negative in all systems except as noted.  Comfortable in bed no chest pain currently.    PMHx:   has a past medical history of PFO with atrial septal aneurysm.    PSHx:   has a past surgical history that includes radius ulna orif (3/17/2014).    SHx:   reports that he has never smoked. He has never used smokeless tobacco. He reports current alcohol use. He reports current drug use.    FHx:  Reviewed with patient, no pertinent family history noted.    Allergies:  No Known Allergies    Medications:  No current facility-administered medications on file prior to encounter.     Current Outpatient Medications on File Prior to Encounter   Medication Sig Dispense Refill   • acetaminophen (TYLENOL) 500 MG Tab Take 1,000 mg by mouth one time as needed.     • atorvastatin (LIPITOR) 80 MG tablet Take 1 Tab by mouth every evening. (Patient not taking: Reported on 4/29/2021) 90 Tab 3   • ticagrelor (BRILINTA) 90 MG Tab tablet Take 1 Tab by mouth 2 Times a Day. (Patient not taking: Reported on  4/29/2021) 180 Tab 3   • metoprolol SR (TOPROL XL) 25 MG TABLET SR 24 HR Take 0.5 Tabs by mouth every day. (Patient not taking: Reported on 4/29/2021) 45 Tab 3   • aspirin EC 81 MG EC tablet Take 1 Tab by mouth every day. (Patient not taking: Reported on 4/29/2021) 30 Tab 5       Objective Exam:  Vitals:    04/29/21 1831 04/29/21 1901 04/29/21 1952 04/29/21 2000   BP: 138/86 137/84 114/79    Pulse: 87 79 63    Resp: 19 18 18    Temp:   36.7 °C (98.1 °F)    TempSrc:   Temporal    SpO2: 95% 94% 93%    Weight:    81.6 kg (179 lb 14.3 oz)   Height:           Physical Exam   Constitutional: He is oriented to person, place, and time. He appears well-developed and well-nourished. No distress.   HENT:   Head: Normocephalic and atraumatic.   Eyes: Conjunctivae are normal. No scleral icterus.   Cardiovascular: Normal rate.   No murmur heard.  Pulmonary/Chest: Effort normal. No stridor. No respiratory distress. He has no wheezes.   Abdominal: Soft. Bowel sounds are normal. He exhibits no distension.   Musculoskeletal:         General: No tenderness or edema.      Cervical back: Neck supple.   Neurological: He is alert and oriented to person, place, and time. Coordination normal.   Skin: Skin is warm and dry. No erythema.   Psychiatric: He has a normal mood and affect. His behavior is normal.   Nursing note and vitals reviewed.      Laboratory--reviewed personally and are as follows:  Lab Results   Component Value Date/Time    WBC 9.3 04/29/2021 03:51 PM    RBC 5.33 04/29/2021 03:51 PM    HEMOGLOBIN 16.6 04/29/2021 03:51 PM    HEMATOCRIT 47.3 04/29/2021 03:51 PM    MCV 88.7 04/29/2021 03:51 PM    MCH 31.1 04/29/2021 03:51 PM    MCHC 35.1 04/29/2021 03:51 PM    MPV 9.8 04/29/2021 03:51 PM    NEUTSPOLYS 75.70 (H) 04/29/2021 03:51 PM    LYMPHOCYTES 15.30 (L) 04/29/2021 03:51 PM    MONOCYTES 6.60 04/29/2021 03:51 PM    EOSINOPHILS 1.30 04/29/2021 03:51 PM    BASOPHILS 0.50 04/29/2021 03:51 PM      Lab Results   Component Value  Date/Time    SODIUM 137 04/29/2021 03:51 PM    POTASSIUM 4.1 04/29/2021 03:51 PM    CHLORIDE 101 04/29/2021 03:51 PM    CO2 26 04/29/2021 03:51 PM    GLUCOSE 88 04/29/2021 03:51 PM    BUN 18 04/29/2021 03:51 PM    CREATININE 1.03 04/29/2021 03:51 PM      Lab Results   Component Value Date/Time    PROTHROMBTM 13.1 04/29/2021 03:51 PM    INR 0.96 04/29/2021 03:51 PM        Radiology  DX-CHEST-PORTABLE (1 VIEW)   Final Result      1.  There is no acute cardiopulmonary process.      CL-LEFT HEART CATHETERIZATION WITH POSSIBLE INTERVENTION    (Results Pending)       Assessment/Plan:  * NSTEMI (non-ST elevated myocardial infarction) (HCC)  Assessment & Plan  Chest pain 2 days prior  Now CP free  Troponin 333  EKG non-ischemic  Cardiology consulted  NPO @ MN, continue IVF  Given ASA + statin  Cath in the AM    Status post insertion of drug-eluting stent into left anterior descending artery - in setting of LAD threombus, CAD possible paradoxical embolism due to PFO- (present on admission)  Assessment & Plan  Previous LAD lesion targeted with stent  Repeat cath in the AM    PFO with atrial septal aneurysm- (present on admission)  Assessment & Plan  History of    Elevated troponin- (present on admission)  Assessment & Plan  Troponin 333       It is expected patient will stay beyond 2 midnights.    VTE prophylaxis: Heparin

## 2021-04-30 NOTE — CARE PLAN
Pt admitted to T125 at approx 1930. Pt ambulated from stretcher to room. Skin intact. Pt educated on use of call light. Pt to go for left heart catheterization tomorrow morning. Pt has no questions at this time. NS at 100cc/hr started. Pre-procedure labs ordered for 0000. Trend troponin. Pt has personal cellphone and valuables at bedside.   Problem: Communication  Goal: The ability to communicate needs accurately and effectively will improve  Outcome: PROGRESSING AS EXPECTED     Problem: Safety  Goal: Will remain free from injury  Outcome: PROGRESSING AS EXPECTED  Goal: Will remain free from falls  Outcome: PROGRESSING AS EXPECTED     Problem: Venous Thromboembolism (VTW)/Deep Vein Thrombosis (DVT) Prevention:  Goal: Patient will participate in Venous Thrombosis (VTE)/Deep Vein Thrombosis (DVT)Prevention Measures  Outcome: PROGRESSING AS EXPECTED     Problem: Pain Management  Goal: Pain level will decrease to patient's comfort goal  Outcome: PROGRESSING AS EXPECTED

## 2021-04-30 NOTE — PROGRESS NOTES
Cardiology Progress Note:      Date of Service: 4/30/2021    Attending: Shekhar Cazares M.D.     Chief Complaint:   Chest pain    ID:  30-year-old male with past medical history of family history of brother with pacemaker placement for bradycardia at age 38, no known familial hypercholesterolemia but with NST elevation MI requiring requiring stent placement x2 to LAD in 01/2019, been off of aspirin and statin since due to personal preference and side effects who presented on this admission with chest pain with radiation to bilateral jaws similar to then, however with low intensity shortly following heavy activity with normal EKG, however elevated troponin on presentation. Last echocardiogram from 01/2019 showed an EF of 45%, positive shunt with atrial septal aneurysm with normal valves.     Daily Progress:  Patient reports mild chest pain with jaw pain at the moment. Jaw pain ( at molar areas) is more appreciable than chest pain when it happens. Patient  Remains hemodynamically stable.   Troponins have been trending up. No telemetry events. Patient is scheduled for angiogram.    Review of Systems:    Review of Systems   Constitutional: Negative for chills and fever.   HENT: Negative for hearing loss and sore throat.    Eyes: Negative for blurred vision and double vision.   Respiratory: Negative for cough, sputum production and shortness of breath.    Cardiovascular: Negative for chest pain, palpitations, orthopnea, leg swelling and PND.   Gastrointestinal: Negative for abdominal pain, nausea and vomiting.   Genitourinary: Negative for dysuria and frequency.   Musculoskeletal: Negative for myalgias.   Skin: Negative for itching.   Neurological: Negative for dizziness and focal weakness.   Psychiatric/Behavioral: Negative for depression.       Physical Exam:   Vitals:   Temp:  [35.8 °C (96.4 °F)-36.7 °C (98.1 °F)] 36.6 °C (97.8 °F)  Pulse:  [61-82] 82  Resp:  [16-18] 17  BP: (105-115)/(58-79) 113/73  SpO2:  [92 %-96 %] 93  %    Physical Exam  Constitutional:       General: He is not in acute distress.     Appearance: Normal appearance.   HENT:      Head: Normocephalic and atraumatic.      Right Ear: External ear normal.      Left Ear: External ear normal.      Nose: Nose normal. No congestion.      Mouth/Throat:      Mouth: Mucous membranes are moist.   Eyes:      General:         Right eye: No discharge.         Left eye: No discharge.      Extraocular Movements: Extraocular movements intact.      Pupils: Pupils are equal, round, and reactive to light.   Cardiovascular:      Rate and Rhythm: Normal rate and regular rhythm.      Pulses: Normal pulses.      Heart sounds: Normal heart sounds. No murmur.   Pulmonary:      Effort: Pulmonary effort is normal.      Breath sounds: Normal breath sounds. No wheezing or rales.   Abdominal:      General: There is no distension.      Palpations: Abdomen is soft.   Musculoskeletal:         General: No swelling.   Skin:     General: Skin is warm and dry.      Capillary Refill: Capillary refill takes less than 2 seconds.   Neurological:      General: No focal deficit present.      Mental Status: He is alert.   Psychiatric:         Mood and Affect: Mood normal.         Labs:   Recent Labs     04/29/21  1551 04/30/21  0422   WBC 9.3 7.7   RBC 5.33 4.95   HEMOGLOBIN 16.6 15.4   HEMATOCRIT 47.3 44.7   MCV 88.7 90.3   MCH 31.1 31.1   RDW 40.0 41.2   PLATELETCT 242 211   MPV 9.8 9.9   NEUTSPOLYS 75.70* 65.10   LYMPHOCYTES 15.30* 22.40   MONOCYTES 6.60 8.50   EOSINOPHILS 1.30 3.00   BASOPHILS 0.50 0.60     Recent Labs     04/29/21  1551   SODIUM 137   POTASSIUM 4.1   CHLORIDE 101   CO2 26   GLUCOSE 88   BUN 18       Recent Labs     04/29/21  1551   ALBUMIN 5.0*   TBILIRUBIN 0.5   ALKPHOSPHAT 89   TOTPROTEIN 8.1   ALTSGPT 33   ASTSGOT 64*   CREATININE 1.03       Imaging:   DX-CHEST-PORTABLE (1 VIEW)   Final Result      1.  There is no acute cardiopulmonary process.      CL-LEFT HEART CATHETERIZATION WITH  POSSIBLE INTERVENTION    (Results Pending)     ---------------------------------------------------  Assessment  ---------------------------------------------------    #NSTEMI  -History of premature coronary artery disease.  - status post stent placement in left anterior descending artery in 2019.  -Has however also been nonadherent to aspirin and statin.  Lipid panel showing LDL of 100 and triglyceride of 206, less in favor of familial hypercholesterolemia.   -Last echocardiogram showing an ejection fraction 45% with shunt along with atrial septal aneurysm on bubble study and normal valves.    Plan  Continue aspirin and Plavix.  -Continue rosuvastatin.  -Agree with gentle hydration.  -Scheduled for coronary angiogram  -Further work up for premature CAD-(?homocysteine, APLA)    #Dyslipidemia   -continue rosuvastatin 20    Adonay Bliss M.D.

## 2021-04-30 NOTE — ASSESSMENT & PLAN NOTE
Trop trending up  Patient denied any chest pain  Asa, plavix, statin, nitroglycerin  Card consulted  Await angiogram

## 2021-04-30 NOTE — NON-PROVIDER
"HOSPITAL MEDICINE PROGRESS NOTE    Date & Time note created:    4/30/2021   10:20 AM       Patient ID:   Name: Zeeshan Hewitt.  YOB: 1990. Age: 30 y.o. male.  MRN: [id number]    PCP : Pcp Pt States None    CC:   Chest pain    HOSPITAL COURSE    Zeeshan Hewitt is a 30 y.o. male with a past medical history of NSTEMI requiring stent placement in January 2019. He presented to the ED after he had experienced chest pain with radiation to his jaw 2 hours after riding his bike. \"[4/28] he had minimal chest discomfort that was almost unnoticeable.  He did not exercise much. 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  He stated both episodes of chest pain were 3-4/ 10 in intensity.  The discomfort was in his upper mid chest with some radiation to the back of his jaws bilaterally.\" EKG showed normal sinus rhythm without ST changes. Initial troponin 333. Patient was admitted to the telemetry floor for monitoring while awaiting cardiac catheterization.    Interval Updates    4/30 Patient appears to be in no acute distress. No chest pain, does endorse headache which he feels is associated with the nitro patch. Patient reports that he stopped taking all prescription medications \"almost immediately after going home\" because he \"did not like the way the medications made me feel.\" Patient half-brother at bedside, reports that he required placement of a pacemaker in his mid thirties, but no other family history of cardiac disease or cotting disorders is known to either brother. Troponins [id number]->[id number]    Review of Systems:    General: no complaints of fevers or chills  HEENT: no complaints of auditory or visual disturbances, positive for headache  Neurological: no complaints of dizziness, numbness, tingling, or weakness  Cardiovascular: no chest pain, no " palpitations, no exercise intolerance  Pulmonary: no complaints of shortness of breath, denies difficulty breathing  Gastrointestinal: no nausea, vomiting or diarrhea  Genitourinary: negative for dysuria, frequency or urgency  Psychiatric: negative for feelings of anxiety or depression    All other systems reviewed and are negative             Allergies to Medications  Patient has no known allergies.      Past Medical History  Active Ambulatory Problems     Diagnosis Date Noted   • Elevated AST (SGOT) 01/24/2019   • Elevated troponin 01/24/2019   • PFO with atrial septal aneurysm    • Status post insertion of drug-eluting stent into left anterior descending artery - in setting of LAD threombus, CAD possible paradoxical embolism due to PFO 01/24/2019     Resolved Ambulatory Problems     Diagnosis Date Noted   • Dislocation of tarsometatarsal joint 03/16/2014   • Acute myocardial infarction involving left anterior descending (LAD) coronary artery ([facility]) 01/24/2019   • JACKIE (acute kidney injury) ([facility]) 01/24/2019     No Additional Past Medical History       Family History:  Family History   Problem Relation Age of Onset   • Diabetes Mother    • Diabetes Father    • Diabetes Brother    • Heart Attack Brother 42   • Heart Disease Brother         pacemaker         Social History:  Social History     Socioeconomic History   • Marital status: Single     Spouse name: Not on file   • Number of children: Not on file   • Years of education: Not on file   • Highest education level: Not on file   Occupational History   • Not on file   Tobacco Use   • Smoking status: Never Smoker   • Smokeless tobacco: Never Used   Substance and Sexual Activity   • Alcohol use: Yes     Comment: occasionally   • Drug use: Yes     Comment: weed   • Sexual activity: Not on file   Other Topics Concern   • Not on file   Social History Narrative   • Not on file     Social Determinants of Health     Financial Resource Strain:    • Difficulty of Paying  "Living Expenses:    Food Insecurity:    • Worried About Running Out of Food in the Last Year:    • Ran Out of Food in the Last Year:    Transportation Needs:    • Lack of Transportation (Medical):    • Lack of Transportation (Non-Medical):    Physical Activity:    • Days of Exercise per Week:    • Minutes of Exercise per Session:    Stress:    • Feeling of Stress :    Social Connections:    • Frequency of Communication with Friends and Family:    • Frequency of Social Gatherings with Friends and Family:    • Attends Buddhism Services:    • Active Member of Clubs or Organizations:    • Attends Club or Organization Meetings:    • Marital Status:    Intimate Partner Violence:    • Fear of Current or Ex-Partner:    • Emotionally Abused:    • Physically Abused:    • Sexually Abused:         Outpatient Medications:  No current facility-administered medications on file prior to encounter.     Current Outpatient Medications on File Prior to Encounter   Medication Sig Dispense Refill   • acetaminophen (TYLENOL) 500 MG Tab Take 1,000 mg by mouth one time as needed.     • atorvastatin (LIPITOR) 80 MG tablet Take 1 Tab by mouth every evening. (Patient not taking: Reported on 4/29/2021) 90 Tab 3   • ticagrelor (BRILINTA) 90 MG Tab tablet Take 1 Tab by mouth 2 Times a Day. (Patient not taking: Reported on 4/29/2021) 180 Tab 3   • metoprolol SR (TOPROL XL) 25 MG TABLET SR 24 HR Take 0.5 Tabs by mouth every day. (Patient not taking: Reported on 4/29/2021) 45 Tab 3   • aspirin EC 81 MG EC tablet Take 1 Tab by mouth every day. (Patient not taking: Reported on 4/29/2021) 30 Tab 5         Physical Exam:   /58   Pulse 72   Temp (!) 35.8 °C (96.4 °F) (Temporal)   Resp 16   Ht 1.753 m (5' 9\")   Wt 81.6 kg (179 lb 14.3 oz)   SpO2 94%   Constitutional:  well developed, well nourished, non-toxic, no acute distress nontoxic appearance.  HENMT: Normocephalic, atraumatic, b/l ears normal, nose normal  Eyes:  EOMI, conjunctiva " normal, no discharge  Neck: no tracheal deviation, supple, no stridor appreciated   Cardiovascular: normal heart rate, normal rhythm, no murmurs, no rubs or gallops; no cyanosis, clubbing or edema  Lungs: Respiratory effort is normal,  breath sounds clear to auscultation bilaterally, no wheezes,No rales no rhonchi appreciated  Abdomen: large scar to lower abdomen from gastroschisis surgery, soft, non-tender, non-distended, no guarding or rebound tenderness, no pulsatile masses noted.   Skin: warm, dry, no erythema and no rash  Extremities:  Distal pulses intact +2.  No cyanosis or clubbing. No edema Noted  No joint deformities, Range of motion appears optimal   Neurologic:  Alert and oriented x3.  Cranial nerves II-XII grossly intact.  No   focal deficits.  Psychiatric: No anxiety or depression      Lab Data Review:    Recent Labs     04/29/21  1551 04/30/21  0422   WBC 9.3 7.7   RBC 5.33 4.95   HEMOGLOBIN 16.6 15.4   HEMATOCRIT 47.3 44.7   MCV 88.7 90.3   MCH 31.1 31.1   RDW 40.0 41.2   PLATELETCT 242 211   MPV 9.8 9.9   NEUTSPOLYS 75.70* 65.10   LYMPHOCYTES 15.30* 22.40   MONOCYTES 6.60 8.50   EOSINOPHILS 1.30 3.00   BASOPHILS 0.50 0.60         Recent Labs     04/29/21  1551 04/30/21  0422   APTT 32.0 32.6   INR 0.96 0.96     Recent Labs     04/29/21  1551   SODIUM 137   POTASSIUM 4.1   CHLORIDE 101   CO2 26   BUN 18   CREATININE 1.03   CALCIUM 9.8   ALBUMIN 5.0*     Recent Labs     04/29/21  1551 04/30/21  0422   ASTSGOT 64*  --    ALTSGPT 33  --    TBILIRUBIN 0.5  --    ALKPHOSPHAT 89  --    GLOBULIN 3.1  --    INR 0.96 0.96         Imaging/Procedures Review:    DX-CHEST-PORTABLE (1 VIEW)   Final Result      1.  There is no acute cardiopulmonary process.      CL-LEFT HEART CATHETERIZATION WITH POSSIBLE INTERVENTION    (Results Pending)       EKG:   per my independant read:  HR: 93, Normal Sinus Rhythm, no ST/T changes      Assessment and Plan:      NSTEMI  No chest pain currently  Planned cardiac cath  today  Continue to trend troponins  ASA, plavix, Nitro patch  Cardiology consult    Hyperlipidemia  crestor    Status post drug-eluting stent to LAD  Patient non-compliant with medications  Cardiology following     PFO with atrial septal aneurysm  History of    History of gastroschisis      Prophylaxis: sc heparin  Code: Full code  Dispo: I anticipate hospital length of stay for medical management to be expected to take greater than than 2 midnights

## 2021-04-30 NOTE — CONSULTS
"Cardiology Initial Consultation    Date of Service  4/29/2021    Referring Physician  Mauricio Ocasio M.D.    Reason for Consultation  Chest pain    History of Presenting Illness  Zeeshan Hewitt is a 30 y.o. male with a past medical history of a prior non-STEMI who presented 4/29/2021 with recurrent chest pain.  The patient was hospitalized at this institution in January 2019 with a non-STEMI.  His initial EKG at that time was unremarkable however by the following day had EKG changes with widening of the QRS and acute ST segment elevation.  Emergency angiography was performed and the patient was found to have a patent LAD but an eccentric lesion measuring approximately 60% with evidence of thrombus.  This was successfully stented with overlapping 3.5x12 and 3.5 x 8 mm Synergy stents.    The patient was asymptomatic until 2 days ago.  He was riding his bicycle and was able to do 20 miles without any problem.  A couple of hours after exercise he developed similar but less severe chest discomfort that he had at the time of his heart attack.  The discomfort radiated mildly to his molar area and then resolved.  Yesterday he had minimal chest discomfort that was almost unnoticeable.  He did not exercise much.  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  He stated both episodes of chest pain were 3-4/ 10 in intensity.  The discomfort was in his upper mid chest with some radiation to the back of his jaws bilaterally.    Currently he is pain-free and resting comfortably.  He has not been taking his aspirin on a regular basis at all and has not been on it recently.  He also stopped his statin as he thought his cholesterol was \"normal.    Family history: No family history of premature coronary disease or clotting.    Social: Non-smoker.  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    Prior surgeries: Abdominal surgery for gastroschisis.      Review of Systems  Review of Systems   Constitutional: Negative for fever.   HENT: Negative.    Eyes: Negative for visual disturbance.   Respiratory: Negative.    Cardiovascular: Positive for chest pain.   Gastrointestinal: Negative for abdominal pain.   Neurological: Negative.    Hematological: Does not bruise/bleed easily.       Past Medical History   has a past medical history of PFO with atrial septal aneurysm.    Surgical History   has a past surgical history that includes radius ulna orif (3/17/2014).    Family History  family history includes Diabetes in his brother, father, and mother; Heart Attack (age of onset: 42) in his brother; Heart Disease in his brother.    Social History   reports that he has never smoked. He has never used smokeless tobacco. He reports current alcohol use. He reports current drug use.    Medications  Prior to Admission Medications   Prescriptions Last Dose Informant Patient Reported? Taking?   aspirin EC 81 MG EC tablet   No No   Sig: Take 1 Tab by mouth every day.   atorvastatin (LIPITOR) 80 MG tablet   No No   Sig: Take 1 Tab by mouth every evening.   metoprolol SR (TOPROL XL) 25 MG TABLET SR 24 HR   No No   Sig: Take 0.5 Tabs by mouth every day.   ticagrelor (BRILINTA) 90 MG Tab tablet   No No   Sig: Take 1 Tab by mouth 2 Times a Day.      Facility-Administered Medications: None       Allergies  No Known Allergies    Vital signs in last 24 hours  Temp:  [36.8 °C (98.2 °F)] 36.8 °C (98.2 °F)  Pulse:  [77-94] 77  Resp:  [12-29] 29  BP: (140-147)/(72-98) 140/95  SpO2:  [95 %-97 %] 95 %    Physical Exam  Physical Exam  Constitutional:       General: He is not in acute distress.     Appearance: He is well-developed. He is not diaphoretic.   HENT:      Head: Atraumatic.   Eyes:      General: No scleral icterus.     Conjunctiva/sclera: Conjunctivae normal.       Pupils: Pupils are equal, round, and reactive to light.   Neck:      Thyroid: No thyromegaly.      Vascular: No JVD.   Cardiovascular:      Rate and Rhythm: Normal rate and regular rhythm.      Heart sounds: No murmur.   Pulmonary:      Effort: Pulmonary effort is normal. No respiratory distress.      Breath sounds: Normal breath sounds. No wheezing or rales.   Abdominal:      General: Bowel sounds are normal. There is no distension.      Palpations: Abdomen is soft. There is no mass.      Tenderness: There is no abdominal tenderness.      Comments: Healed midline surgical scar   Musculoskeletal:      Cervical back: Neck supple.   Skin:     General: Skin is warm and dry.   Neurological:      Mental Status: He is alert and oriented to person, place, and time.         Lab Review  Lab Results   Component Value Date/Time    WBC 9.3 04/29/2021 03:51 PM    RBC 5.33 04/29/2021 03:51 PM    HEMOGLOBIN 16.6 04/29/2021 03:51 PM    HEMATOCRIT 47.3 04/29/2021 03:51 PM    MCV 88.7 04/29/2021 03:51 PM    MCH 31.1 04/29/2021 03:51 PM    MCHC 35.1 04/29/2021 03:51 PM    MPV 9.8 04/29/2021 03:51 PM      Lab Results   Component Value Date/Time    SODIUM 137 04/29/2021 03:51 PM    POTASSIUM 4.1 04/29/2021 03:51 PM    CHLORIDE 101 04/29/2021 03:51 PM    CO2 26 04/29/2021 03:51 PM    GLUCOSE 88 04/29/2021 03:51 PM    BUN 18 04/29/2021 03:51 PM    CREATININE 1.03 04/29/2021 03:51 PM      Lab Results   Component Value Date/Time    ASTSGOT 64 (H) 04/29/2021 03:51 PM    ALTSGPT 33 04/29/2021 03:51 PM     Lab Results   Component Value Date/Time    CHOLSTRLTOT 174 01/24/2019 05:13 AM     (H) 01/24/2019 05:13 AM    HDL 33 (A) 01/24/2019 05:13 AM    TRIGLYCERIDE 153 (H) 01/24/2019 05:13 AM    TROPONINT 333 (H) 04/29/2021 03:51 PM       No results for input(s): NTPROBNP in the last 72 hours.    EKG: The EKG from admission was personally reviewed and demonstrates sinus rhythm with minimal upsloping ST segment depression in V4 through  V6.    Impression:    Non-STEMI myocardial infarction:  The patient is a fascinating history of a prior non-STEMI in January this year with very similar but more severe chest discomfort and transient ST segment elevation once he was in the hospital.  Angiography revealed a patent but thrombosed mid RCA that was stented successfully.    He presents today with similar but much less severe chest discomfort and EKG currently shows no changes.  He will be treated aggressively with chewable aspirin heparin and nitrates.  The patient be scheduled for angiography in the morning.    The cause of his recurrent non-STEMI myocardial infarction is is uncertain.  There is no family history of any clotting disorder, lipid abnormality other than mildly low HDL, family history of myocardial infarction's or family history of lipid abnormalities.  I suspect he likely had a endothelial tear or possibly a small dissection as the cause for this event.    He is currently pain-free and hemodynamically stable.  If his condition changes then emergency angiography would be considered.              Please contact me with any questions.    Joby Shah M.D.   Cardiologist, [facility]  [phone]

## 2021-05-01 ENCOUNTER — APPOINTMENT (OUTPATIENT)
Dept: CARDIOLOGY | Facility: MEDICAL CENTER | Age: 31
DRG: 281 | End: 2021-05-01
Attending: NURSE PRACTITIONER
Payer: COMMERCIAL

## 2021-05-01 ENCOUNTER — APPOINTMENT (OUTPATIENT)
Dept: CARDIOLOGY | Facility: MEDICAL CENTER | Age: 31
DRG: 281 | End: 2021-05-01
Attending: INTERNAL MEDICINE
Payer: COMMERCIAL

## 2021-05-01 VITALS
RESPIRATION RATE: 17 BRPM | TEMPERATURE: 99.3 F | SYSTOLIC BLOOD PRESSURE: 128 MMHG | BODY MASS INDEX: 26.64 KG/M2 | WEIGHT: 179.9 LBS | DIASTOLIC BLOOD PRESSURE: 84 MMHG | HEART RATE: 61 BPM | OXYGEN SATURATION: 95 % | HEIGHT: 69 IN

## 2021-05-01 LAB
ALBUMIN SERPL BCP-MCNC: 4 G/DL (ref 3.2–4.9)
ALBUMIN/GLOB SERPL: 1.6 G/DL
ALP SERPL-CCNC: 80 U/L (ref 30–99)
ALT SERPL-CCNC: 31 U/L (ref 2–50)
ANION GAP SERPL CALC-SCNC: 7 MMOL/L (ref 7–16)
AST SERPL-CCNC: 72 U/L (ref 12–45)
BASOPHILS # BLD AUTO: 0.8 % (ref 0–1.8)
BASOPHILS # BLD: 0.05 K/UL (ref 0–0.12)
BILIRUB SERPL-MCNC: 0.5 MG/DL (ref 0.1–1.5)
BUN SERPL-MCNC: 16 MG/DL (ref 8–22)
CALCIUM SERPL-MCNC: 8.7 MG/DL (ref 8.5–10.5)
CHLORIDE SERPL-SCNC: 103 MMOL/L (ref 96–112)
CO2 SERPL-SCNC: 27 MMOL/L (ref 20–33)
CREAT SERPL-MCNC: 0.88 MG/DL (ref 0.5–1.4)
EOSINOPHIL # BLD AUTO: 0.31 K/UL (ref 0–0.51)
EOSINOPHIL NFR BLD: 4.8 % (ref 0–6.9)
ERYTHROCYTE [DISTWIDTH] IN BLOOD BY AUTOMATED COUNT: 41.1 FL (ref 35.9–50)
GLOBULIN SER CALC-MCNC: 2.5 G/DL (ref 1.9–3.5)
GLUCOSE SERPL-MCNC: 93 MG/DL (ref 65–99)
HCT VFR BLD AUTO: 42.6 % (ref 42–52)
HGB BLD-MCNC: 14.6 G/DL (ref 14–18)
IMM GRANULOCYTES # BLD AUTO: 0.02 K/UL (ref 0–0.11)
IMM GRANULOCYTES NFR BLD AUTO: 0.3 % (ref 0–0.9)
LV EJECT FRACT  99904: 60
LYMPHOCYTES # BLD AUTO: 2.01 K/UL (ref 1–4.8)
LYMPHOCYTES NFR BLD: 31.4 % (ref 22–41)
MCH RBC QN AUTO: 31.2 PG (ref 27–33)
MCHC RBC AUTO-ENTMCNC: 34.3 G/DL (ref 33.7–35.3)
MCV RBC AUTO: 91 FL (ref 81.4–97.8)
MONOCYTES # BLD AUTO: 0.51 K/UL (ref 0–0.85)
MONOCYTES NFR BLD AUTO: 8 % (ref 0–13.4)
NEUTROPHILS # BLD AUTO: 3.51 K/UL (ref 1.82–7.42)
NEUTROPHILS NFR BLD: 54.7 % (ref 44–72)
NRBC # BLD AUTO: 0 K/UL
NRBC BLD-RTO: 0 /100 WBC
PLATELET # BLD AUTO: 204 K/UL (ref 164–446)
PMV BLD AUTO: 9.6 FL (ref 9–12.9)
POTASSIUM SERPL-SCNC: 4.4 MMOL/L (ref 3.6–5.5)
PROT SERPL-MCNC: 6.5 G/DL (ref 6–8.2)
RBC # BLD AUTO: 4.68 M/UL (ref 4.7–6.1)
SODIUM SERPL-SCNC: 137 MMOL/L (ref 135–145)
WBC # BLD AUTO: 6.4 K/UL (ref 4.8–10.8)

## 2021-05-01 PROCEDURE — 700111 HCHG RX REV CODE 636 W/ 250 OVERRIDE (IP): Performed by: HOSPITALIST

## 2021-05-01 PROCEDURE — 700102 HCHG RX REV CODE 250 W/ 637 OVERRIDE(OP): Performed by: INTERNAL MEDICINE

## 2021-05-01 PROCEDURE — 700102 HCHG RX REV CODE 250 W/ 637 OVERRIDE(OP)

## 2021-05-01 PROCEDURE — 93306 TTE W/DOPPLER COMPLETE: CPT

## 2021-05-01 PROCEDURE — 99232 SBSQ HOSP IP/OBS MODERATE 35: CPT | Mod: 25 | Performed by: INTERNAL MEDICINE

## 2021-05-01 PROCEDURE — 85025 COMPLETE CBC W/AUTO DIFF WBC: CPT

## 2021-05-01 PROCEDURE — 700101 HCHG RX REV CODE 250

## 2021-05-01 PROCEDURE — 99152 MOD SED SAME PHYS/QHP 5/>YRS: CPT | Performed by: INTERNAL MEDICINE

## 2021-05-01 PROCEDURE — A9270 NON-COVERED ITEM OR SERVICE: HCPCS

## 2021-05-01 PROCEDURE — 93306 TTE W/DOPPLER COMPLETE: CPT | Mod: 26 | Performed by: INTERNAL MEDICINE

## 2021-05-01 PROCEDURE — 93458 L HRT ARTERY/VENTRICLE ANGIO: CPT | Mod: 26 | Performed by: INTERNAL MEDICINE

## 2021-05-01 PROCEDURE — B2111ZZ FLUOROSCOPY OF MULTIPLE CORONARY ARTERIES USING LOW OSMOLAR CONTRAST: ICD-10-PCS | Performed by: INTERNAL MEDICINE

## 2021-05-01 PROCEDURE — 4A023N7 MEASUREMENT OF CARDIAC SAMPLING AND PRESSURE, LEFT HEART, PERCUTANEOUS APPROACH: ICD-10-PCS | Performed by: INTERNAL MEDICINE

## 2021-05-01 PROCEDURE — 99153 MOD SED SAME PHYS/QHP EA: CPT

## 2021-05-01 PROCEDURE — B2151ZZ FLUOROSCOPY OF LEFT HEART USING LOW OSMOLAR CONTRAST: ICD-10-PCS | Performed by: INTERNAL MEDICINE

## 2021-05-01 PROCEDURE — A9270 NON-COVERED ITEM OR SERVICE: HCPCS | Performed by: INTERNAL MEDICINE

## 2021-05-01 PROCEDURE — 700117 HCHG RX CONTRAST REV CODE 255: Performed by: INTERNAL MEDICINE

## 2021-05-01 PROCEDURE — 99239 HOSP IP/OBS DSCHRG MGMT >30: CPT | Performed by: INTERNAL MEDICINE

## 2021-05-01 PROCEDURE — 36415 COLL VENOUS BLD VENIPUNCTURE: CPT

## 2021-05-01 PROCEDURE — 80053 COMPREHEN METABOLIC PANEL: CPT

## 2021-05-01 PROCEDURE — 700111 HCHG RX REV CODE 636 W/ 250 OVERRIDE (IP)

## 2021-05-01 RX ORDER — CLOPIDOGREL BISULFATE 75 MG/1
75 TABLET ORAL DAILY
Qty: 30 TABLET | Refills: 11 | Status: SHIPPED | OUTPATIENT
Start: 2021-05-02

## 2021-05-01 RX ORDER — VERAPAMIL HYDROCHLORIDE 2.5 MG/ML
INJECTION, SOLUTION INTRAVENOUS
Status: COMPLETED
Start: 2021-05-01 | End: 2021-05-01

## 2021-05-01 RX ORDER — HEPARIN SODIUM 1000 [USP'U]/ML
INJECTION, SOLUTION INTRAVENOUS; SUBCUTANEOUS
Status: COMPLETED
Start: 2021-05-01 | End: 2021-05-01

## 2021-05-01 RX ORDER — ASPIRIN 81 MG/1
TABLET, CHEWABLE ORAL
Status: COMPLETED
Start: 2021-05-01 | End: 2021-05-01

## 2021-05-01 RX ORDER — MIDAZOLAM HYDROCHLORIDE 1 MG/ML
INJECTION INTRAMUSCULAR; INTRAVENOUS
Status: COMPLETED
Start: 2021-05-01 | End: 2021-05-01

## 2021-05-01 RX ORDER — LIDOCAINE HYDROCHLORIDE 20 MG/ML
INJECTION, SOLUTION INFILTRATION; PERINEURAL
Status: COMPLETED
Start: 2021-05-01 | End: 2021-05-01

## 2021-05-01 RX ORDER — HEPARIN SODIUM 200 [USP'U]/100ML
INJECTION, SOLUTION INTRAVENOUS
Status: COMPLETED
Start: 2021-05-01 | End: 2021-05-01

## 2021-05-01 RX ORDER — ATORVASTATIN CALCIUM 80 MG/1
40 TABLET, FILM COATED ORAL EVERY EVENING
Qty: 30 TABLET | Refills: 0 | Status: SHIPPED | OUTPATIENT
Start: 2021-05-01 | End: 2021-05-04 | Stop reason: SDUPTHER

## 2021-05-01 RX ADMIN — IOHEXOL 69 ML: 350 INJECTION, SOLUTION INTRAVENOUS at 09:45

## 2021-05-01 RX ADMIN — ASPIRIN 81 MG: 81 TABLET, COATED ORAL at 05:39

## 2021-05-01 RX ADMIN — HEPARIN SODIUM: 200 INJECTION, SOLUTION INTRAVENOUS at 09:15

## 2021-05-01 RX ADMIN — METOPROLOL TARTRATE 25 MG: 25 TABLET, FILM COATED ORAL at 05:39

## 2021-05-01 RX ADMIN — HEPARIN SODIUM: 1000 INJECTION, SOLUTION INTRAVENOUS; SUBCUTANEOUS at 09:21

## 2021-05-01 RX ADMIN — ASPIRIN 243 MG: 81 TABLET, CHEWABLE ORAL at 09:29

## 2021-05-01 RX ADMIN — NITROGLYCERIN 10 ML: 20 INJECTION INTRAVENOUS at 09:24

## 2021-05-01 RX ADMIN — LIDOCAINE HYDROCHLORIDE: 20 INJECTION, SOLUTION INFILTRATION; PERINEURAL at 09:23

## 2021-05-01 RX ADMIN — VERAPAMIL HYDROCHLORIDE 2.5 MG: 2.5 INJECTION, SOLUTION INTRAVENOUS at 09:24

## 2021-05-01 RX ADMIN — FENTANYL CITRATE 100 MCG: 50 INJECTION, SOLUTION INTRAMUSCULAR; INTRAVENOUS at 09:28

## 2021-05-01 RX ADMIN — CLOPIDOGREL BISULFATE 75 MG: 75 TABLET ORAL at 05:39

## 2021-05-01 RX ADMIN — HEPARIN SODIUM 5000 UNITS: 5000 INJECTION, SOLUTION INTRAVENOUS; SUBCUTANEOUS at 05:39

## 2021-05-01 RX ADMIN — ROSUVASTATIN CALCIUM 20 MG: 20 TABLET, FILM COATED ORAL at 17:57

## 2021-05-01 RX ADMIN — MIDAZOLAM HYDROCHLORIDE 2 MG: 1 INJECTION, SOLUTION INTRAMUSCULAR; INTRAVENOUS at 09:28

## 2021-05-01 RX ADMIN — NITROGLYCERIN 0.5 INCH: 20 OINTMENT TOPICAL at 05:42

## 2021-05-01 RX ADMIN — METOPROLOL TARTRATE 25 MG: 25 TABLET, FILM COATED ORAL at 17:57

## 2021-05-01 ASSESSMENT — ENCOUNTER SYMPTOMS
ORTHOPNEA: 0
DIARRHEA: 0
FOCAL WEAKNESS: 0
HEADACHES: 1
STRIDOR: 0
CHOKING: 0
CHEST TIGHTNESS: 0
ABDOMINAL PAIN: 0
COUGH: 0
SPUTUM PRODUCTION: 0
SHORTNESS OF BREATH: 0
APNEA: 0
EYE DISCHARGE: 0
WHEEZING: 0
BACK PAIN: 0
VOMITING: 0
BLURRED VISION: 0
WEIGHT LOSS: 0
EYE PAIN: 0
NECK PAIN: 0
PALPITATIONS: 0
DIZZINESS: 0
MYALGIAS: 0
INSOMNIA: 0
SEIZURES: 0

## 2021-05-01 NOTE — CARE PLAN
Problem: Safety  Goal: Will remain free from falls  Outcome: PROGRESSING AS EXPECTED  Note: Patient has remained free from falls while in hospital     Problem: Pain Management  Goal: Pain level will decrease to patient's comfort goal  Outcome: PROGRESSING SLOWER THAN EXPECTED  Note: Patient Is complaining of 4/10 lower back pain and headache

## 2021-05-01 NOTE — PROCEDURES
DATE OF PROCEDURE:  05/01/2021     REFERRING PHYSICIANS:  Joby Shah MD and Julio C Barajas MD     PROCEDURES:  1.  Left heart catheterization.  2.  Coronary angiography.  3.  Left ventriculogram.  4.  Monitor conscious sedation.     PREPROCEDURE DIAGNOSES:  1.  Non-ST elevation myocardial infarction.  2.  History of cardiomyopathy.     POSTPROCEDURE DIAGNOSES:  1.  Patent stent in the mid left anterior descending artery, otherwise normal   coronary artery system.  2.  Reduced left ventricular systolic function with ejection fraction of 40%.    Global hypokinesis.  3.  Elevated left ventricular end-diastolic pressure.     INDICATIONS:  The patient is a 30-year-old male with past medical history   significant for coronary artery disease with placement of 3.5x12 and 3.5x8 mm   Synergy drug-eluting stent in overlapping fashion in the mid left anterior   descending artery by Cris Thomason on 01/24/2019.  He was   admitted for chest pain and was diagnosed with non-STEMI.  He was scheduled   for cardiac catheterization.     DESCRIPTION OF PROCEDURE:  After informed consent was signed by the   patient, the patient was brought to the cardiac catheterization laboratory.  He was   prepped and draped in the usual sterile manner.  The right ulnar area was then  anesthetized with 2% Xylocaine.  A 6-Malagasy sheath was inserted into the   right ulnar artery using modified Seldinger technique under ultrasound   guidance.  Intra-arterial verapamil and nitroglycerin were given.  IV heparin   was given.  A 4-Malagasy pigtail catheter was positioned into the left   ventricle.  Left angiography was performed.  This was exchanged for a 4-Malagasy  JL3.5 catheter, which was positioned into the left main coronary artery.    Coronary angiography was performed.  This was exchanged for a 4-Malagasy JR4   catheter, which was positioned into the right coronary artery.  Coronary   angiography was performed.  The patient  tolerated the procedure well.  At the   end of the procedure, all catheters and sheaths were removed.  Hemoband was   placed on the right wrist.  He was transferred back to telemetry in stable   condition.     HEMODYNAMIC DATA:  Hemodynamic data shows aortic pressures of 120/80 mmHg   with mean of 100 mmHg and /0 mmHg with LVEDP of 20 mmHg.     AORTIC VALVE:  There was no significant gradient noted.     LEFT VENTRICULOGRAM:  A 10 mL of contrast were delivered for 2 seconds.    Ejection fraction was estimated to be 40%.  There was mid to distal anterior   and apical hypokinesis noted.     ANGIOGRAM:  LEFT MAIN CORONARY ARTERY:  Left main coronary artery is a moderate length   large-caliber vessel free of disease.     LEFT ANTERIOR DESCENDING ARTERY:  Left anterior descending artery is a long   large-caliber vessel, which wraps around the apex.  There are two patent   stents in the mid portion.  Mid to distal portion of the vessel, there is an   eccentric 30% stenosis.  There is a moderate caliber bifurcating diagonal   branch noted free of disease.     LEFT CIRCUMFLEX ARTERY:  Left circumflex artery is a nondominant large caliber   vessel with large bifurcating obtuse marginal branch.  Left circumflex artery   and its branches are free of disease.     RIGHT CORONARY ARTERY:  Right coronary artery is a dominant, large caliber   vessel with small posterior descending artery.  Right coronary artery and its   branches are free of disease.     IMPRESSION:  1.  Patent stent in the mid left anterior descending artery, otherwise normal   coronary artery system.  2.  Reduced left ventricular systolic function with ejection fraction of 40%.    Global hypokinesis.  3.  Elevated left ventricular end-diastolic pressure.     RECOMMENDATION:  Recommended to continue with medical therapy.    SEDATION TIME: The patient's sedation was managed by myself with continuous   face to face time with the patient for 15 minutes from  09:10 to 09:25.     NOTIFICATION:  His wife, Silvia, was notified at 9:50.        ______________________________  MD JOEL SCHULTZ/BALDOMERO/DASHA    DD:  05/01/2021 09:58  DT:  05/01/2021 10:23    Job#:  [id number]

## 2021-05-01 NOTE — PROGRESS NOTES
Cardiology Follow Up Progress Note    Date of Service  4/30/2021    Attending Physician  Shekhar Cazares M.D.    Chief Complaint   NSTEMI    YOUNG Hewitt is a 30 y.o. male admitted 4/29/2021 with NSTEMI    Interim Events  On and off chest pain all night  Trop rising all night  Awaiting cath  Echo pending      Review of Systems  Review of Systems   Constitutional: Negative for activity change, appetite change, chills and diaphoresis.   Eyes: Negative for pain, discharge, redness and itching.   Respiratory: Negative for cough, choking, chest tightness and stridor.    Cardiovascular: Negative for palpitations.   Gastrointestinal: Negative for abdominal distention, abdominal pain, constipation, diarrhea and nausea.   Endocrine: Negative for cold intolerance, heat intolerance, polydipsia and polyphagia.   Genitourinary: Negative for difficulty urinating, dysuria, enuresis, flank pain, frequency, genital sores and hematuria.   Musculoskeletal: Negative for back pain, gait problem, joint swelling and myalgias.   Skin: Negative for color change, pallor and rash.   Neurological: Negative for tremors, seizures, syncope, speech difficulty, weakness, light-headedness, numbness and headaches.   Hematological: Negative for adenopathy. Does not bruise/bleed easily.   Psychiatric/Behavioral: Negative for agitation, behavioral problems, confusion, decreased concentration and hallucinations. The patient is not nervous/anxious.        Vital signs in last 24 hours  Temp:  [35.8 °C (96.4 °F)-36.7 °C (98.1 °F)] 36.1 °C (96.9 °F)  Pulse:  [61-88] 68  Resp:  [12-29] 16  BP: (105-140)/(58-95) 105/64  SpO2:  [92 %-97 %] 96 %    Physical Exam  Physical Exam  Vitals and nursing note reviewed.   Constitutional:       General: He is not in acute distress.     Appearance: Normal appearance. He is normal weight. He is not ill-appearing, toxic-appearing or diaphoretic.   HENT:      Head: Normocephalic and atraumatic.      Right Ear: Ear  canal and external ear normal.      Left Ear: Ear canal and external ear normal.      Nose: Nose normal. No congestion or rhinorrhea.      Mouth/Throat:      Mouth: Mucous membranes are moist.      Pharynx: Oropharynx is clear. No oropharyngeal exudate or posterior oropharyngeal erythema.   Eyes:      General: No scleral icterus.        Right eye: No discharge.         Left eye: No discharge.      Extraocular Movements: Extraocular movements intact.      Conjunctiva/sclera: Conjunctivae normal.      Pupils: Pupils are equal, round, and reactive to light.   Neck:      Vascular: No carotid bruit.   Cardiovascular:      Rate and Rhythm: Normal rate and regular rhythm.      Pulses: Normal pulses.      Heart sounds: Normal heart sounds. No murmur. No gallop.    Pulmonary:      Effort: Pulmonary effort is normal. No respiratory distress.      Breath sounds: Normal breath sounds. No stridor. No wheezing, rhonchi or rales.   Abdominal:      General: Abdomen is flat. Bowel sounds are normal. There is no distension.      Palpations: Abdomen is soft. There is no mass.      Tenderness: There is no abdominal tenderness. There is no guarding or rebound.      Hernia: No hernia is present.   Musculoskeletal:         General: No swelling or tenderness. Normal range of motion.      Cervical back: Normal range of motion and neck supple. No rigidity. No muscular tenderness.      Right lower leg: No edema.      Left lower leg: No edema.   Lymphadenopathy:      Cervical: No cervical adenopathy.   Skin:     General: Skin is warm and dry.      Capillary Refill: Capillary refill takes 2 to 3 seconds.      Coloration: Skin is not jaundiced or pale.      Findings: No bruising or lesion.   Neurological:      General: No focal deficit present.      Mental Status: He is alert and oriented to person, place, and time. Mental status is at baseline.      Cranial Nerves: No cranial nerve deficit.      Motor: No weakness.   Psychiatric:         Mood  and Affect: Mood normal.         Behavior: Behavior normal.         Thought Content: Thought content normal.         Judgment: Judgment normal.         Lab Review  Lab Results   Component Value Date/Time    WBC 7.7 04/30/2021 04:22 AM    RBC 4.95 04/30/2021 04:22 AM    HEMOGLOBIN 15.4 04/30/2021 04:22 AM    HEMATOCRIT 44.7 04/30/2021 04:22 AM    MCV 90.3 04/30/2021 04:22 AM    MCH 31.1 04/30/2021 04:22 AM    MCHC 34.5 04/30/2021 04:22 AM    MPV 9.9 04/30/2021 04:22 AM      Lab Results   Component Value Date/Time    SODIUM 137 04/29/2021 03:51 PM    POTASSIUM 4.1 04/29/2021 03:51 PM    CHLORIDE 101 04/29/2021 03:51 PM    CO2 26 04/29/2021 03:51 PM    GLUCOSE 88 04/29/2021 03:51 PM    BUN 18 04/29/2021 03:51 PM    CREATININE 1.03 04/29/2021 03:51 PM      Lab Results   Component Value Date/Time    ASTSGOT 64 (H) 04/29/2021 03:51 PM    ALTSGPT 33 04/29/2021 03:51 PM     Lab Results   Component Value Date/Time    CHOLSTRLTOT 171 04/30/2021 04:22 AM     (H) 04/30/2021 04:22 AM    HDL 30 (A) 04/30/2021 04:22 AM    TRIGLYCERIDE 206 (H) 04/30/2021 04:22 AM    TROPONINT 2093 (H) 04/30/2021 04:22 AM       No results for input(s): NTPROBNP in the last 72 hours.    Cardiac Imaging and Procedures Review  EKG:  My personal interpretation of the EKG dated 4/29/2021 personally viewed interpreted myself showing normal sinus rhythm with nonspecific ST segment changes.    Echocardiogram: Dated 1/26/2019 personally reviewed internally sounds are normal LV systolic function no valvular heart disease.    Cardiac Catheterization:  NA    Imaging  Chest X-Ray:  NA     Stress Test:  NA    Assessment/Plan  No new Assessment & Plan notes have been filed under this hospital service since the last note was generated.  Service: Cardiology  30-year-old male with chest pain consistent with an NSTEMI.  We are waiting his cardiac catheterization.  I would continue him on nitro.  I will add 25 metoprolol twice daily to his antianginal  regimens.    Thank you for allowing me to participate in the care of this patient.  I will continue to follow this patient    Please contact me with any questions.    Julio C Barajas M.D.   Cardiologist, [facility]  [phone]

## 2021-05-01 NOTE — PROGRESS NOTES
[facility] Medicine Daily Progress Note    Date of Service  5/1/2021    Chief Complaint  30 y.o. male admitted 4/29/2021 with chest pain    Hospital Course  This is a 30 y.o. male with history of non-ST elevation MI in January 2019, angio showing LAD thrombus status post PCI with stent, return of unstable angina/chest pain or riding his bike 2 days ago.  Reported seems similar to prior event.  Yesterday pain resolving.  Today no pain.  No fevers or chills.  No nausea or vomiting.     Troponin 333, cardiology consulted.  Aspirin given, n.p.o. at midnight plan is for cath in the morning.  Labs reviewed within normal limits outside of troponin.  Vitals stable.       Interval Problem Update  4/30 The patient denies any chest pain today.  But complained of bilateral headache after nitroglycerin.  His troponin went up to over 2000.  Likely will need angiogram    5/1: Denies any chest pain today.  Await angiogram.    Consultants/Specialty  Cardiology    Code Status  Full Code    Disposition  Remain on alcohol    Review of Systems  Review of Systems   Constitutional: Negative for weight loss.   HENT: Negative for congestion and nosebleeds.    Eyes: Negative for blurred vision, pain and discharge.   Respiratory: Negative for cough, sputum production, shortness of breath and stridor.    Cardiovascular: Negative for chest pain, palpitations and orthopnea.   Gastrointestinal: Negative for abdominal pain, diarrhea and vomiting.   Genitourinary: Negative for dysuria, frequency and urgency.   Musculoskeletal: Negative for back pain, myalgias and neck pain.   Skin: Negative for itching and rash.   Neurological: Positive for headaches. Negative for dizziness, focal weakness and seizures.   Psychiatric/Behavioral: The patient does not have insomnia.         Physical Exam  Temp:  [36.1 °C (96.9 °F)-37.2 °C (99 °F)] 37.2 °C (99 °F)  Pulse:  [57-82] 61  Resp:  [16-18] 18  BP: (105-123)/(64-80) 111/71  SpO2:  [93 %-96 %] 93 %    Physical  Exam  Vitals reviewed.   Constitutional:       General: He is not in acute distress.     Appearance: Normal appearance.   HENT:      Head: Normocephalic and atraumatic.      Nose: No congestion or rhinorrhea.   Eyes:      Extraocular Movements: Extraocular movements intact.      Pupils: Pupils are equal, round, and reactive to light.   Cardiovascular:      Rate and Rhythm: Normal rate and regular rhythm.      Pulses: Normal pulses.   Pulmonary:      Effort: Pulmonary effort is normal.      Breath sounds: Normal breath sounds.   Abdominal:      General: Bowel sounds are normal.      Palpations: Abdomen is soft.   Musculoskeletal:         General: No swelling.      Cervical back: Normal range of motion and neck supple.   Skin:     General: Skin is warm.   Neurological:      General: No focal deficit present.      Mental Status: He is alert.         Fluids    Intake/Output Summary (Last 24 hours) at 5/1/2021 0807  Last data filed at 5/1/2021 0700  Gross per 24 hour   Intake 2500 ml   Output --   Net 2500 ml       Laboratory  Recent Labs     04/29/21  1551 04/30/21  0422 05/01/21  0345   WBC 9.3 7.7 6.4   RBC 5.33 4.95 4.68*   HEMOGLOBIN 16.6 15.4 14.6   HEMATOCRIT 47.3 44.7 42.6   MCV 88.7 90.3 91.0   MCH 31.1 31.1 31.2   MCHC 35.1 34.5 34.3   RDW 40.0 41.2 41.1   PLATELETCT 242 211 204   MPV 9.8 9.9 9.6     Recent Labs     04/29/21  1551 05/01/21  0345   SODIUM 137 137   POTASSIUM 4.1 4.4   CHLORIDE 101 103   CO2 26 27   GLUCOSE 88 93   BUN 18 16   CREATININE 1.03 0.88   CALCIUM 9.8 8.7     Recent Labs     04/29/21  1551 04/30/21  0422   APTT 32.0 32.6   INR 0.96 0.96         Recent Labs     04/30/21  0422   TRIGLYCERIDE 206*   HDL 30*   *       Imaging  DX-CHEST-PORTABLE (1 VIEW)   Final Result      1.  There is no acute cardiopulmonary process.      CL-LEFT HEART CATHETERIZATION WITH POSSIBLE INTERVENTION    (Results Pending)        Assessment/Plan  * NSTEMI (non-ST elevated myocardial infarction)  (HCC)  Assessment & Plan  Trop trending up  Patient denied any chest pain  Asa, plavix, statin, nitroglycerin  Card consulted  Await angiogram    Status post insertion of drug-eluting stent into left anterior descending artery - in setting of LAD threombus, CAD possible paradoxical embolism due to PFO- (present on admission)  Assessment & Plan  Previous LAD lesion targeted with stent  Card following: will likely need cath    PFO with atrial septal aneurysm- (present on admission)  Assessment & Plan  History of    Elevated troponin- (present on admission)  Assessment & Plan  Related to NSTEMI       VTE prophylaxis: heparin

## 2021-05-01 NOTE — PROGRESS NOTES
Received Bedside report. Assumed care at 1900. This pt is AOx4, ambulatory with no assistance required, voiding adequately, 0/10 pain. Patient and RN discussed plan of care: questions answered. Labs noted, assessment complete, patient tolerating 2 gram sodium diet. Tele box in place. Pt is on room air. Call light in place, fall precautions in place, patient educated on importance of calling for assistance. No additional needs at this time. VSS

## 2021-05-01 NOTE — NON-PROVIDER
"HOSPITAL MEDICINE PROGRESS NOTE    Date & Time note created:    5/1/2021   10:52 AM       Patient ID:   Name: Zeeshan Hewitt.  YOB: 1990. Age: 30 y.o. male.  MRN: [id number]    PCP : Pcp Pt States None    CC:   Chest pain     HOSPITAL COURSE    Zeeshan Hewitt is a 30 y.o. male with a past medical history of NSTEMI requiring stent placement in January 2019. He presented to the ED after he had experienced chest pain with radiation to his jaw 2 hours after riding his bike. \"[4/28] he had minimal chest discomfort that was almost unnoticeable.  He did not exercise much. 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  He stated both episodes of chest pain were 3-4/ 10 in intensity.  The discomfort was in his upper mid chest with some radiation to the back of his jaws bilaterally.\" EKG showed normal sinus rhythm without ST changes. Initial troponin 333. Patient was admitted to the telemetry floor for monitoring while awaiting cardiac catheterization.     Interval Updates     4/30 Patient appears to be in no acute distress. No chest pain, does endorse headache which he feels is associated with the nitro patch. Patient reports that he stopped taking all prescription medications \"almost immediately after going home\" because he \"did not like the way the medications made me feel.\" Patient half-brother at bedside, reports that he required placement of a pacemaker in his mid thirties, but no other family history of cardiac disease or cotting disorders is known to either brother. Troponins 333->1351->2093  5/1 No overnight events. Cath done today, patent LAD stent, possible discharge post ECHO     Review of Systems:    General: no complaints of fevers or chills  HEENT: no complaints of auditory or visual disturbances, positive for headache  Neurological: no " complaints of dizziness, numbness, tingling, or weakness  Cardiovascular: no chest pain, no palpitations, no exercise intolerance  Pulmonary: no complaints of shortness of breath, denies difficulty breathing  Gastrointestinal: no nausea, vomiting or diarrhea  Genitourinary: negative for dysuria, frequency or urgency  Psychiatric: negative for feelings of anxiety or depression     All other systems reviewed and are negative           Allergies to Medications  Patient has no known allergies.      Past Medical History  Active Ambulatory Problems     Diagnosis Date Noted   • Elevated AST (SGOT) 01/24/2019   • Elevated troponin 01/24/2019   • PFO with atrial septal aneurysm    • Status post insertion of drug-eluting stent into left anterior descending artery - in setting of LAD threombus, CAD possible paradoxical embolism due to PFO 01/24/2019     Resolved Ambulatory Problems     Diagnosis Date Noted   • Dislocation of tarsometatarsal joint 03/16/2014   • Acute myocardial infarction involving left anterior descending (LAD) coronary artery ([facility]) 01/24/2019   • JACKIE (acute kidney injury) ([facility]) 01/24/2019     No Additional Past Medical History       Family History:  Family History   Problem Relation Age of Onset   • Diabetes Mother    • Diabetes Father    • Diabetes Brother    • Heart Attack Brother 42   • Heart Disease Brother         pacemaker         Social History:  Social History     Socioeconomic History   • Marital status: Single     Spouse name: Not on file   • Number of children: Not on file   • Years of education: Not on file   • Highest education level: Not on file   Occupational History   • Not on file   Tobacco Use   • Smoking status: Never Smoker   • Smokeless tobacco: Never Used   Substance and Sexual Activity   • Alcohol use: Yes     Comment: occasionally   • Drug use: Yes     Comment: weed   • Sexual activity: Not on file   Other Topics Concern   • Not on file   Social History Narrative   • Not on file  "    Social Determinants of Health     Financial Resource Strain:    • Difficulty of Paying Living Expenses:    Food Insecurity:    • Worried About Running Out of Food in the Last Year:    • Ran Out of Food in the Last Year:    Transportation Needs:    • Lack of Transportation (Medical):    • Lack of Transportation (Non-Medical):    Physical Activity:    • Days of Exercise per Week:    • Minutes of Exercise per Session:    Stress:    • Feeling of Stress :    Social Connections:    • Frequency of Communication with Friends and Family:    • Frequency of Social Gatherings with Friends and Family:    • Attends Buddhist Services:    • Active Member of Clubs or Organizations:    • Attends Club or Organization Meetings:    • Marital Status:    Intimate Partner Violence:    • Fear of Current or Ex-Partner:    • Emotionally Abused:    • Physically Abused:    • Sexually Abused:         Outpatient Medications:  No current facility-administered medications on file prior to encounter.     Current Outpatient Medications on File Prior to Encounter   Medication Sig Dispense Refill   • acetaminophen (TYLENOL) 500 MG Tab Take 1,000 mg by mouth one time as needed.     • atorvastatin (LIPITOR) 80 MG tablet Take 1 Tab by mouth every evening. (Patient not taking: Reported on 4/29/2021) 90 Tab 3   • metoprolol SR (TOPROL XL) 25 MG TABLET SR 24 HR Take 0.5 Tabs by mouth every day. (Patient not taking: Reported on 4/29/2021) 45 Tab 3   • aspirin EC 81 MG EC tablet Take 1 Tab by mouth every day. (Patient not taking: Reported on 4/29/2021) 30 Tab 5         Physical Exam:   /71   Pulse 61   Temp 37.2 °C (99 °F) (Temporal)   Resp 18   Ht 1.753 m (5' 9\")   Wt 81.6 kg (179 lb 14.3 oz)   SpO2 93%   Constitutional:  well developed, well nourished, non-toxic, no acute distress nontoxic appearance.  HENMT: Normocephalic, atraumatic, b/l ears normal, nose normal  Eyes:  EOMI, conjunctiva normal, no discharge  Neck: no tracheal deviation, " supple, no stridor appreciated   Cardiovascular: normal heart rate, normal rhythm, no murmurs, no rubs or gallops; no cyanosis, clubbing or edema  Lungs: Respiratory effort is normal,  breath sounds clear to auscultation bilaterally, no wheezes,No rales no rhonchi appreciated  Abdomen: soft, non-tender, non-distended, no guarding or rebound tenderness, no pulsatile masses noted.   Skin: warm, dry, no erythema and no rash  Extremities:  Distal pulses intact +2.  No cyanosis or clubbing. No edema Noted  No joint deformities, Range of motion appears optimal   Neurologic:  Alert and oriented x3.  Cranial nerves II-XII grossly intact.  No   focal deficits.  Psychiatric: No anxiety or depression      Lab Data Review:    Recent Labs     04/29/21 1551 04/30/21 0422 05/01/21  0345   WBC 9.3 7.7 6.4   RBC 5.33 4.95 4.68*   HEMOGLOBIN 16.6 15.4 14.6   HEMATOCRIT 47.3 44.7 42.6   MCV 88.7 90.3 91.0   MCH 31.1 31.1 31.2   RDW 40.0 41.2 41.1   PLATELETCT 242 211 204   MPV 9.8 9.9 9.6   NEUTSPOLYS 75.70* 65.10 54.70   LYMPHOCYTES 15.30* 22.40 31.40   MONOCYTES 6.60 8.50 8.00   EOSINOPHILS 1.30 3.00 4.80   BASOPHILS 0.50 0.60 0.80         Recent Labs     04/29/21  1551 04/30/21  0422   APTT 32.0 32.6   INR 0.96 0.96     Recent Labs     04/29/21 1551 05/01/21  0345   SODIUM 137 137   POTASSIUM 4.1 4.4   CHLORIDE 101 103   CO2 26 27   BUN 18 16   CREATININE 1.03 0.88   CALCIUM 9.8 8.7   ALBUMIN 5.0* 4.0     Recent Labs     04/29/21  1551 04/30/21 0422 05/01/21  0345   ASTSGOT 64*  --  72*   ALTSGPT 33  --  31   TBILIRUBIN 0.5  --  0.5   ALKPHOSPHAT 89  --  80   GLOBULIN 3.1  --  2.5   INR 0.96 0.96  --          Imaging/Procedures Review:    DX-CHEST-PORTABLE (1 VIEW)   Final Result      1.  There is no acute cardiopulmonary process.      CL-LEFT HEART CATHETERIZATION WITH POSSIBLE INTERVENTION    (Results Pending)   EC-ECHOCARDIOGRAM COMPLETE W/O CONT    (Results Pending)       Assessment and Plan:    NSTEMI  No chest pain  currently  Cath done  ASA, plavix, Nitro patch  Cardiology consult  ECHO pending  May be able to discharge with cardiology approval     Hyperlipidemia  crestor     Status post drug-eluting stent to LAD  Patient non-compliant with medications  Cardiology following      PFO with atrial septal aneurysm  History of     History of gastroschisis         Prophylaxis: sc heparin  Code: Full code  Dispo: I anticipate hospital length of stay for medical management to be expected to take less than than 2 midnights

## 2021-05-01 NOTE — PROGRESS NOTES
Cardiology Follow Up Progress Note    Date of Service  5/1/2021    Attending Physician  Shekhar Cazares M.D.    PMH: NSTEMI (1/2019) s/p PCI/DUKE to LAD    Presents with recurrent chest pain      Interim Events    No overnight cardiac events  Underwent coronary angiography this morning, patent LAD stent  Labs reviewed  Triglycerides 206     /71    Awaiting echocardiogram  Likely DC this afternoon          Review of Systems  Review of Systems   Respiratory: Negative for apnea, cough, choking, chest tightness, shortness of breath, wheezing and stridor.        Vital signs in last 24 hours  Temp:  [36.1 °C (96.9 °F)-37.2 °C (99 °F)] 37.2 °C (99 °F)  Pulse:  [57-82] 61  Resp:  [16-18] 18  BP: (105-123)/(64-80) 111/71  SpO2:  [93 %-96 %] 93 %    Physical Exam  Physical Exam  Cardiovascular:      Rate and Rhythm: Normal rate and regular rhythm.   Pulmonary:      Effort: Pulmonary effort is normal.   Skin:     General: Skin is warm.      Comments: TR band intact   Neurological:      General: No focal deficit present.      Mental Status: He is alert.   Psychiatric:         Mood and Affect: Mood normal.         Lab Review  Lab Results   Component Value Date/Time    WBC 6.4 05/01/2021 03:45 AM    RBC 4.68 (L) 05/01/2021 03:45 AM    HEMOGLOBIN 14.6 05/01/2021 03:45 AM    HEMATOCRIT 42.6 05/01/2021 03:45 AM    MCV 91.0 05/01/2021 03:45 AM    MCH 31.2 05/01/2021 03:45 AM    MCHC 34.3 05/01/2021 03:45 AM    MPV 9.6 05/01/2021 03:45 AM      Lab Results   Component Value Date/Time    SODIUM 137 05/01/2021 03:45 AM    POTASSIUM 4.4 05/01/2021 03:45 AM    CHLORIDE 103 05/01/2021 03:45 AM    CO2 27 05/01/2021 03:45 AM    GLUCOSE 93 05/01/2021 03:45 AM    BUN 16 05/01/2021 03:45 AM    CREATININE 0.88 05/01/2021 03:45 AM      Lab Results   Component Value Date/Time    ASTSGOT 72 (H) 05/01/2021 03:45 AM    ALTSGPT 31 05/01/2021 03:45 AM     Lab Results   Component Value Date/Time    CHOLSTRLTOT 171 04/30/2021 04:22 AM      (H) 04/30/2021 04:22 AM    HDL 30 (A) 04/30/2021 04:22 AM    TRIGLYCERIDE 206 (H) 04/30/2021 04:22 AM    TROPONINT 2093 (H) 04/30/2021 04:22 AM       No results for input(s): NTPROBNP in the last 72 hours.    Cardiac Imaging and Procedures Review  EKG: SR    Echocardiogram:      Cardiac Catheterization: Official note pending    Imaging  Chest X-Ray: No acute cardiopulmonary process    Stress Test: n/a     Assessment/Plan    NSTEMI  Trop peaked at~2000  -s/p left heart cath this morning revealed patent LAD stent  -Continue with aspirin, Plavix, atorvastatin  -Awaiting echocardiogram      History of NSTEMI 2019/transient ST segment elevation  -Status post successful PCI to LAD 2019      Follow-up on echo  Likely discharge this afternoon          Thank you for allowing me to participate in the care of this patient.      Please contact me with any questions.    IVONNE Bermudez   Cardiologist, [facility]  [phone]

## 2021-05-02 NOTE — PROGRESS NOTES
Pt discharged to home with girlfriend. Pt verbally agrees to follow up with PCP and Cards.   Pt will  new medications.  Pt aware of risks and restrictions.

## 2021-05-02 NOTE — DISCHARGE SUMMARY
Discharge Summary    CHIEF COMPLAINT ON ADMISSION  Chief Complaint   Patient presents with   • Chest Pain   • Jaw Pain       Reason for Admission  Jaw Pain      Admission Date  4/29/2021    CODE STATUS  Full Code    HPI & HOSPITAL COURSE  ER course  This is a 30 y.o. male with history of non-ST elevation MI in January 2019, angio showing LAD thrombus status post PCI with stent, return of unstable angina/chest pain or riding his bike 2 days ago.  Reported seems similar to prior event.  Yesterday pain resolving.  Today no pain.  No fevers or chills.  No nausea or vomiting.     Troponin 333, cardiology consulted.  Aspirin given, n.p.o. at midnight plan is for cath in the morning.  Labs reviewed within normal limits outside of troponin.  Vitals stable.    His trop continue to trend up and his chest pain improved with nitro. Cardiologist was consulted and did echo with LVEF 60% with no significant finding. Vital: stable. He was educated about medication compliance and follow up with cardiology as outpatient. Per cardiology: he can be discharged home today. I saw and examined him upon discharge  Please call [phone] to schedule PCP appointment for patient.    Required specialty appointments include:     As below      Therefore, he is discharged in fair and stable condition to home with close outpatient follow-up.    The patient met 2-midnight criteria for an inpatient stay at the time of discharge.    Discharge Date  05/01/21      FOLLOW UP ITEMS POST DISCHARGE      DISCHARGE DIAGNOSES  Principal Problem:    NSTEMI (non-ST elevated myocardial infarction) (HCC) POA: Unknown  Active Problems:    Elevated troponin POA: Yes    PFO with atrial septal aneurysm (Chronic) POA: Yes    Status post insertion of drug-eluting stent into left anterior descending artery - in setting of LAD threombus, CAD possible paradoxical embolism due to PFO (Chronic) POA: Yes    History of gastroschisis POA: Unknown  Resolved Problems:    * No  resolved hospital problems. *      FOLLOW UP  No future appointments.  PCP    In 1 week      Julio C Barajas M.D.  [address]   [address]  [facility] [address]  [phone]    In 1 week        MEDICATIONS ON DISCHARGE     Medication List      START taking these medications      Instructions   clopidogrel 75 MG Tabs  Start taking on: May 2, 2021  Commonly known as: PLAVIX   Take 1 tablet by mouth every day.  Dose: 75 mg        CHANGE how you take these medications      Instructions   atorvastatin 80 MG tablet  What changed: how much to take  Commonly known as: LIPITOR   Take 0.5 Tablets by mouth every evening.  Dose: 40 mg        CONTINUE taking these medications      Instructions   acetaminophen 500 MG Tabs  Commonly known as: TYLENOL   Take 1,000 mg by mouth one time as needed.  Dose: 1,000 mg     aspirin 81 MG EC tablet   Take 1 Tab by mouth every day.  Dose: 81 mg        STOP taking these medications    metoprolol SR 25 MG Tb24  Commonly known as: TOPROL XL     ticagrelor 90 MG Tabs tablet  Commonly known as: Brilinta            Allergies  No Known Allergies    DIET  Orders Placed This Encounter   Procedures   • Diet Order Diet: Cardiac     Standing Status:   Standing     Number of Occurrences:   1     Order Specific Question:   Diet:     Answer:   Cardiac [6]       ACTIVITY  As tolerated.  Weight bearing as tolerated    CONSULTATIONS  card    PROCEDURES      LABORATORY  Lab Results   Component Value Date    SODIUM 137 05/01/2021    POTASSIUM 4.4 05/01/2021    CHLORIDE 103 05/01/2021    CO2 27 05/01/2021    GLUCOSE 93 05/01/2021    BUN 16 05/01/2021    CREATININE 0.88 05/01/2021        Lab Results   Component Value Date    WBC 6.4 05/01/2021    HEMOGLOBIN 14.6 05/01/2021    HEMATOCRIT 42.6 05/01/2021    PLATELETCT 204 05/01/2021        Total time of the discharge process exceeds 38 minutes.

## 2021-05-02 NOTE — DISCHARGE INSTRUCTIONS
Discharge Instructions    Discharged to home by car with friend. Discharged via wheelchair, hospital escort: Yes.  Special equipment needed: Not Applicable    Be sure to schedule a follow-up appointment with your primary care doctor or any specialists as instructed.     Discharge Plan:        I understand that a diet low in cholesterol, fat, and sodium is recommended for good health. Unless I have been given specific instructions below for another diet, I accept this instruction as my diet prescription.   Other diet: Regular    Special Instructions: Diagnosis:  Acute Coronary Syndrome (ACS) is a diagnosis that encompasses cardiac-related chest pain and heart attack. ACS occurs when the blood flow to the heart muscle is severely reduced or cut off completely due to a slow process called atherosclerosis.  Atherosclerosis is a disease in which the coronary arteries become narrow from a buildup of fat, cholesterol, and other substances that combine to form plaque. If the plaque breaks, a blood clot will form and block the blood flow to the heart muscle. This lack of blood flow can cause damage or death to the heart muscle which is called a heart attack or Myocardial Infarction (MI). There are two different types of MIs:  ST Elevation Myocardial Infarction or STEMI (the most severe type of heart attack) and Non-ST Elevation Myocardial Infarction or NSTEMI.    Treatment Plan:  · Cardiac Diet  - Low fat, low salt, low cholesterol   · Cardiac Rehab  - Your doctor has ordered you a referral to [facility].  Call [phone] to schedule an appointment.  · Attend my follow-up appointment with my Cardiologist.  · Take my medications as prescribed by my doctor  · Exercise daily  · Lower my bad cholesterol and raise my good cholesterol and Reduce stress    Medications:  Certain medications are used to treat ACS.  Remember to always take medications as prescribed and never stop talking medications unless told by your doctor.    You  have been prescribed the following medicatons:    Beta-Blocker - Beta-Blocker metoprolol is used to lower blood pressure and heart rate, and/or helps your heart heal after a heart attack.    · Is patient discharged on Warfarin / Coumadin?   No     Depression / Suicide Risk    As you are discharged from this [facility] facility, it is important to learn how to keep safe from harming yourself.    Recognize the warning signs:  · Abrupt changes in personality, positive or negative- including increase in energy   · Giving away possessions  · Change in eating patterns- significant weight changes-  positive or negative  · Change in sleeping patterns- unable to sleep or sleeping all the time   · Unwillingness or inability to communicate  · Depression  · Unusual sadness, discouragement and loneliness  · Talk of wanting to die  · Neglect of personal appearance   · Rebelliousness- reckless behavior  · Withdrawal from people/activities they love  · Confusion- inability to concentrate     If you or a loved one observes any of these behaviors or has concerns about self-harm, here's what you can do:  · Talk about it- your feelings and reasons for harming yourself  · Remove any means that you might use to hurt yourself (examples: pills, rope, extension cords, firearm)  · Get professional help from the community (Mental Health, Substance Abuse, psychological counseling)  · Do not be alone:Call your Safe Contact- someone whom you trust who will be there for you.  · Call your local CRISIS HOTLINE 139-5170 or 580-169-7895  · Call your local Children's Mobile Crisis Response Team [place] (621) 752-4152 or www.[employer]  · Call the toll free National Suicide Prevention Hotlines   · National Suicide Prevention Lifeline 538-256-VCLC (2566)  · National Hope Line Network 800-SUICIDE (238-3900)

## 2021-05-04 ENCOUNTER — OFFICE VISIT (OUTPATIENT)
Dept: CARDIOLOGY | Facility: MEDICAL CENTER | Age: 31
End: 2021-05-04
Payer: COMMERCIAL

## 2021-05-04 VITALS
SYSTOLIC BLOOD PRESSURE: 110 MMHG | HEIGHT: 69 IN | DIASTOLIC BLOOD PRESSURE: 80 MMHG | OXYGEN SATURATION: 95 % | BODY MASS INDEX: 26.9 KG/M2 | HEART RATE: 83 BPM | RESPIRATION RATE: 16 BRPM | WEIGHT: 181.6 LBS

## 2021-05-04 DIAGNOSIS — I21.4 NSTEMI (NON-ST ELEVATED MYOCARDIAL INFARCTION) (HCC): ICD-10-CM

## 2021-05-04 DIAGNOSIS — R74.01 ELEVATED AST (SGOT): ICD-10-CM

## 2021-05-04 DIAGNOSIS — Z95.5 STATUS POST INSERTION OF DRUG-ELUTING STENT INTO LEFT ANTERIOR DESCENDING ARTERY: Chronic | ICD-10-CM

## 2021-05-04 DIAGNOSIS — I25.3 PFO WITH ATRIAL SEPTAL ANEURYSM: Chronic | ICD-10-CM

## 2021-05-04 DIAGNOSIS — R79.89 ELEVATED TROPONIN: ICD-10-CM

## 2021-05-04 DIAGNOSIS — Z87.761 HISTORY OF GASTROSCHISIS: Primary | ICD-10-CM

## 2021-05-04 DIAGNOSIS — Q21.12 PFO WITH ATRIAL SEPTAL ANEURYSM: Chronic | ICD-10-CM

## 2021-05-04 PROCEDURE — 99215 OFFICE O/P EST HI 40 MIN: CPT | Performed by: INTERNAL MEDICINE

## 2021-05-04 RX ORDER — ATORVASTATIN CALCIUM 80 MG/1
80 TABLET, FILM COATED ORAL EVERY EVENING
Qty: 90 TABLET | Refills: 11 | Status: SHIPPED | OUTPATIENT
Start: 2021-05-04

## 2021-05-04 RX ORDER — EZETIMIBE 10 MG/1
10 TABLET ORAL DAILY
Qty: 30 TABLET | Refills: 11 | Status: SHIPPED | OUTPATIENT
Start: 2021-05-04

## 2021-05-04 ASSESSMENT — ENCOUNTER SYMPTOMS
RESPIRATORY NEGATIVE: 1
NEUROLOGICAL NEGATIVE: 1
PALPITATIONS: 0
SORE THROAT: 0
CARDIOVASCULAR NEGATIVE: 1
DIZZINESS: 0
COUGH: 0
PND: 0
SHORTNESS OF BREATH: 0
MUSCULOSKELETAL NEGATIVE: 1
WEAKNESS: 0
LOSS OF CONSCIOUSNESS: 0
CLAUDICATION: 0
STRIDOR: 0
GASTROINTESTINAL NEGATIVE: 1
BRUISES/BLEEDS EASILY: 0
HEMOPTYSIS: 0
CONSTITUTIONAL NEGATIVE: 1
SPUTUM PRODUCTION: 0
ORTHOPNEA: 0
CHILLS: 0
EYES NEGATIVE: 1
FEVER: 0
WHEEZING: 0

## 2021-05-04 ASSESSMENT — FIBROSIS 4 INDEX: FIB4 SCORE: 1.9

## 2021-05-04 NOTE — PROGRESS NOTES
Chief Complaint   Patient presents with   • Follow-Up      DX:MILD HEART ATTACK/HEART CATH        Subjective:   Zeeshan Hewitt is a 30 y.o. male who presents today as a follow-up for his premature CAD.  His recent hospital for his NSTEMI.  Troponin resulted 2000.  He did not receive any revascularization.  He is now back to normal health with no chest pain.  He worked yesterday with no functional imitations.  He was only on half of atorvastatin and stopped his aspirin and statin.  He has never had a secondary work-up for premature atherosclerosis.    Past Medical History:   Diagnosis Date   • PFO with atrial septal aneurysm      Past Surgical History:   Procedure Laterality Date   • RADIUS ULNA ORIF  3/17/2014    Performed by Joby Gunderson M.D. at SURGERY [facility]     Family History   Problem Relation Age of Onset   • Diabetes Mother    • Diabetes Father    • Diabetes Brother    • Heart Attack Brother 42   • Heart Disease Brother         pacemaker     Social History     Socioeconomic History   • Marital status: Single     Spouse name: Not on file   • Number of children: Not on file   • Years of education: Not on file   • Highest education level: Not on file   Occupational History   • Not on file   Tobacco Use   • Smoking status: Never Smoker   • Smokeless tobacco: Never Used   Substance and Sexual Activity   • Alcohol use: Yes     Comment: occasionally   • Drug use: Yes     Types: Marijuana     Comment: weed   • Sexual activity: Not on file   Other Topics Concern   • Not on file   Social History Narrative   • Not on file     Social Determinants of Health     Financial Resource Strain:    • Difficulty of Paying Living Expenses:    Food Insecurity:    • Worried About Running Out of Food in the Last Year:    • Ran Out of Food in the Last Year:    Transportation Needs:    • Lack of Transportation (Medical):    • Lack of Transportation (Non-Medical):    Physical Activity:    • Days of Exercise per Week:     • Minutes of Exercise per Session:    Stress:    • Feeling of Stress :    Social Connections:    • Frequency of Communication with Friends and Family:    • Frequency of Social Gatherings with Friends and Family:    • Attends [employer] Services:    • Active Member of Clubs or Organizations:    • Attends Club or Organization Meetings:    • Marital Status:    Intimate Partner Violence:    • Fear of Current or Ex-Partner:    • Emotionally Abused:    • Physically Abused:    • Sexually Abused:      No Known Allergies  Outpatient Encounter Medications as of 5/4/2021   Medication Sig Dispense Refill   • ezetimibe (ZETIA) 10 MG Tab Take 1 tablet by mouth every day. 30 tablet 11   • atorvastatin (LIPITOR) 80 MG tablet Take 1 tablet by mouth every evening. 90 tablet 11   • clopidogrel (PLAVIX) 75 MG Tab Take 1 tablet by mouth every day. 30 tablet 11   • acetaminophen (TYLENOL) 500 MG Tab Take 1,000 mg by mouth one time as needed.     • aspirin EC 81 MG EC tablet Take 1 Tab by mouth every day. 30 Tab 5   • [DISCONTINUED] atorvastatin (LIPITOR) 80 MG tablet Take 0.5 Tablets by mouth every evening. 30 tablet 0     No facility-administered encounter medications on file as of 5/4/2021.     Review of Systems   Constitutional: Negative.  Negative for chills, fever and malaise/fatigue.   HENT: Negative.  Negative for sore throat.    Eyes: Negative.    Respiratory: Negative.  Negative for cough, hemoptysis, sputum production, shortness of breath, wheezing and stridor.    Cardiovascular: Negative.  Negative for chest pain, palpitations, orthopnea, claudication, leg swelling and PND.   Gastrointestinal: Negative.    Genitourinary: Negative.    Musculoskeletal: Negative.    Skin: Negative.    Neurological: Negative.  Negative for dizziness, loss of consciousness and weakness.   Endo/Heme/Allergies: Negative.  Does not bruise/bleed easily.   All other systems reviewed and are negative.       Objective:   /80 (BP Location: Left  "arm, Patient Position: Sitting, BP Cuff Size: Adult)   Pulse 83   Resp 16   Ht 1.753 m (5' 9\")   Wt 82.4 kg (181 lb 9.6 oz)   SpO2 95%   BMI 26.82 kg/m²     Physical Exam   Constitutional: He appears well-developed and well-nourished. No distress.   HENT:   Head: Normocephalic and atraumatic.   Right Ear: External ear normal.   Left Ear: External ear normal.   Nose: Nose normal.   Mouth/Throat: No oropharyngeal exudate.   Eyes: Pupils are equal, round, and reactive to light. Conjunctivae and EOM are normal. Right eye exhibits no discharge. Left eye exhibits no discharge. No scleral icterus.   Neck: No JVD present.   Cardiovascular: Normal rate, regular rhythm and intact distal pulses. Exam reveals no gallop and no friction rub.   No murmur heard.  Pulmonary/Chest: Effort normal. No stridor. No respiratory distress. He has no wheezes. He has no rales. He exhibits no tenderness.   Abdominal: Soft. He exhibits no distension. There is no guarding.   Musculoskeletal:         General: No tenderness, deformity or edema. Normal range of motion.      Cervical back: Neck supple.   Neurological: He is alert. He has normal reflexes. He displays normal reflexes. No cranial nerve deficit. He exhibits normal muscle tone. Coordination normal.   Skin: Skin is warm and dry. No rash noted. He is not diaphoretic. No erythema. No pallor.   Psychiatric: He has a normal mood and affect. His behavior is normal. Judgment and thought content normal.   Nursing note and vitals reviewed.    Echocardiogram: Dated 5/1/2021 personally reviewed undetermined myself showing normal LV systolic function with no valvular heart disease.    Echocardiogram: Dated 1/25/2019 personally viewed interpret myself showing normal LV systolic function with no valvular heart disease.    Lab Results   Component Value Date/Time    CHOLSTRLTOT 171 04/30/2021 04:22 AM     (H) 04/30/2021 04:22 AM    HDL 30 (A) 04/30/2021 04:22 AM    TRIGLYCERIDE 206 (H) " 04/30/2021 04:22 AM       Lab Results   Component Value Date/Time    SODIUM 137 05/01/2021 03:45 AM    POTASSIUM 4.4 05/01/2021 03:45 AM    CHLORIDE 103 05/01/2021 03:45 AM    CO2 27 05/01/2021 03:45 AM    GLUCOSE 93 05/01/2021 03:45 AM    BUN 16 05/01/2021 03:45 AM    CREATININE 0.88 05/01/2021 03:45 AM     Lab Results   Component Value Date/Time    ALKPHOSPHAT 80 05/01/2021 03:45 AM    ASTSGOT 72 (H) 05/01/2021 03:45 AM    ALTSGPT 31 05/01/2021 03:45 AM    TBILIRUBIN 0.5 05/01/2021 03:45 AM        Assessment:     1. History of gastroschisis  ezetimibe (ZETIA) 10 MG Tab   2. Status post insertion of drug-eluting stent into left anterior descending artery - in setting of LAD threombus, CAD possible paradoxical embolism due to PFO  atorvastatin (LIPITOR) 80 MG tablet    CBC W/ DIFF W/O PLATELETS    Comp Metabolic Panel    Lipid Profile    LIPOPROTEIN A   3. PFO with atrial septal aneurysm  ezetimibe (ZETIA) 10 MG Tab    atorvastatin (LIPITOR) 80 MG tablet    CBC W/ DIFF W/O PLATELETS    Comp Metabolic Panel    Lipid Profile    LIPOPROTEIN A   4. NSTEMI (non-ST elevated myocardial infarction) (HCC)  ezetimibe (ZETIA) 10 MG Tab    atorvastatin (LIPITOR) 80 MG tablet    CBC W/ DIFF W/O PLATELETS    Comp Metabolic Panel    Lipid Profile    LIPOPROTEIN A   5. Elevated troponin     6. Elevated AST (SGOT)         Medical Decision Making:  Today's Assessment / Status / Plan:     30-year-old male with premature atherosclerosis.  I will increase his atorvastatin to 80 and add on Zetia.  He will stay on aspirin Plavix for 1 year.  We will send him for lipids including a lipoprotein a level.  We will consider further testing for lipoprotein and advanced atherosclerotic markers.  We will send her for genetic testing for FH.  I will see him back in 3 months.

## 2021-08-26 ENCOUNTER — TELEPHONE (OUTPATIENT)
Dept: CARDIOLOGY | Facility: MEDICAL CENTER | Age: 31
End: 2021-08-26

## 2021-08-26 NOTE — TELEPHONE ENCOUNTER
JENNIFER    Pt called and states that he would like an EKG and is wondering if RO can have one placed. Please call pt back to further discuss at [phone].     Thank you

## 2021-08-27 ENCOUNTER — TELEPHONE (OUTPATIENT)
Dept: SCHEDULING | Facility: IMAGING CENTER | Age: 31
End: 2021-08-27

## 2021-08-27 NOTE — TELEPHONE ENCOUNTER
"S/W pt, has been having some chest pain that woke him up in his sleep and has been in the middle of nowhere.    Gets \"a jolt in middle of chest\" waited a little bit. 8/10 pain    Next day started to feel a slight lighter pain on shift 3-4/10    ER precautions. Appt made 8/30/21  "

## 2021-08-30 ENCOUNTER — OFFICE VISIT (OUTPATIENT)
Dept: CARDIOLOGY | Facility: MEDICAL CENTER | Age: 31
End: 2021-08-30
Payer: COMMERCIAL

## 2021-08-30 VITALS
DIASTOLIC BLOOD PRESSURE: 68 MMHG | HEIGHT: 69 IN | RESPIRATION RATE: 16 BRPM | HEART RATE: 90 BPM | BODY MASS INDEX: 24.68 KG/M2 | WEIGHT: 166.6 LBS | OXYGEN SATURATION: 98 % | SYSTOLIC BLOOD PRESSURE: 122 MMHG

## 2021-08-30 DIAGNOSIS — Z86.718 HISTORY OF ARTERIAL THROMBOSIS: ICD-10-CM

## 2021-08-30 DIAGNOSIS — E78.5 DYSLIPIDEMIA: ICD-10-CM

## 2021-08-30 DIAGNOSIS — Z95.5 STATUS POST INSERTION OF DRUG-ELUTING STENT INTO LEFT ANTERIOR DESCENDING ARTERY: Chronic | ICD-10-CM

## 2021-08-30 PROCEDURE — 99214 OFFICE O/P EST MOD 30 MIN: CPT | Performed by: PHYSICIAN ASSISTANT

## 2021-08-30 ASSESSMENT — ENCOUNTER SYMPTOMS
SHORTNESS OF BREATH: 0
BRUISES/BLEEDS EASILY: 0
DECREASED APPETITE: 0
BLURRED VISION: 0
BLOATING: 0
NEAR-SYNCOPE: 0
VOMITING: 0
WEAKNESS: 0
MYALGIAS: 0
DYSPNEA ON EXERTION: 0
PALPITATIONS: 0
ORTHOPNEA: 0
NAUSEA: 0
FEVER: 0
DIAPHORESIS: 0
DIZZINESS: 0
SNORING: 0
ABDOMINAL PAIN: 0
SYNCOPE: 0

## 2021-08-30 ASSESSMENT — FIBROSIS 4 INDEX: FIB4 SCORE: 1.9

## 2021-08-30 NOTE — PROGRESS NOTES
Cardiology Clinic Follow-up Note    Date of note:    8/30/2021  Primary Care Provider: Pcp Pt States None    Name:             Zeeshan Hewitt  YOB: 1990  MRN:               [id number]    CC: CAD with recent NSTEMI 5/2021    Primary Cardiologist: Dr. Barajas    Patient HPI:   Zeeshan Hewitt is a 30 y.o. male with PMH including CAD with hx of  mLAD stent 1/2019 due to thrombus, then NSTEMI again in 5/2021 without intervention.  He has  PFO, mild Dyslipidemia.    Interim History:  Mr. Hewitt was last seen in this cardiology office by Dr. Barajas in 5/2021.    He comes in today c/o waking with sharp CP a few nights ago.   The pain was intense and woke him from his sleep, lasted a few minutes and subsided.   He was awake for about 20 minutes and then went back to sleep without recurrence.     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  Had a few more transient chest pains.  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  He is home now on break.     He states this pain is very much different from what he had previous with his MI.  Both prior episodes (1/2019 and 5/2021) he had jaw pain with exertion and chest pressure.     He denies any other symptoms such as shortness of breath, syncope, pre syncope, nausea.    He did have LLE swelling a few days ago, but went to the Urgent care in [place], they ruled out DVT.    UDS in 2019 + for marijuana and opiates (he had been prescribed percocet at that time)    Review of Systems   Constitutional: Negative for decreased appetite, diaphoresis, fever and malaise/fatigue.   Eyes: Negative for blurred vision.   Cardiovascular: Positive for chest pain. Negative for dyspnea on exertion, leg swelling, near-syncope, orthopnea, palpitations and syncope.   Respiratory: Negative for shortness of breath and snoring.    Hematologic/Lymphatic: Negative for bleeding problem. Does not bruise/bleed  "easily.   Skin: Negative for rash.   Musculoskeletal: Negative for joint pain and myalgias.   Gastrointestinal: Negative for bloating, abdominal pain, nausea and vomiting.   Genitourinary: Negative for frequency.   Neurological: Negative for dizziness and weakness.        Past medical history, social history and family history reviewed.  No changes other than what is outlined in HPI.    Current Outpatient Medications   Medication Sig Dispense Refill   • ezetimibe (ZETIA) 10 MG Tab Take 1 tablet by mouth every day. 30 tablet 11   • atorvastatin (LIPITOR) 80 MG tablet Take 1 tablet by mouth every evening. 90 tablet 11   • clopidogrel (PLAVIX) 75 MG Tab Take 1 tablet by mouth every day. 30 tablet 11   • acetaminophen (TYLENOL) 500 MG Tab Take 1,000 mg by mouth one time as needed.     • aspirin EC 81 MG EC tablet Take 1 Tab by mouth every day. 30 Tab 5     No current facility-administered medications for this visit.       No Known Allergies      Physical Exam:  Ambulatory Vitals  /68 (BP Location: Left arm, Patient Position: Sitting, BP Cuff Size: Adult)   Pulse 90   Resp 16   Ht 1.753 m (5' 9\")   Wt 75.6 kg (166 lb 9.6 oz)   SpO2 98%    BP Readings from Last 4 Encounters:   08/30/21 122/68   05/04/21 110/80   05/01/21 128/84   02/08/19 118/92       Weight/BMI: Body mass index is 24.6 kg/m².  Wt Readings from Last 4 Encounters:   08/30/21 75.6 kg (166 lb 9.6 oz)   05/04/21 82.4 kg (181 lb 9.6 oz)   04/29/21 81.6 kg (179 lb 14.3 oz)   02/08/19 84.8 kg (187 lb)       General: no apparent distress  Lungs: normal effort, without crackles, no wheezing or rhonchi.  Heart: normal rate, regular rhythm, no murmur, no rub  Ext:  no lower extremity edema.  Neurological: No focal deficits, no facial asymmetry.  Normal speech.  Psychiatric: Appropriate affect, alert and oriented x 3.   Skin: Warm extremities, no rash.      Lab Data Review:  Lab Results   Component Value Date/Time    CHOLSTRLTOT 171 04/30/2021 04:22 AM    "  (H) 04/30/2021 04:22 AM    HDL 30 (A) 04/30/2021 04:22 AM    TRIGLYCERIDE 206 (H) 04/30/2021 04:22 AM       Lab Results   Component Value Date/Time    SODIUM 137 05/01/2021 03:45 AM    POTASSIUM 4.4 05/01/2021 03:45 AM    CHLORIDE 103 05/01/2021 03:45 AM    CO2 27 05/01/2021 03:45 AM    GLUCOSE 93 05/01/2021 03:45 AM    BUN 16 05/01/2021 03:45 AM    CREATININE 0.88 05/01/2021 03:45 AM     Lab Results   Component Value Date/Time    ALKPHOSPHAT 80 05/01/2021 03:45 AM    ASTSGOT 72 (H) 05/01/2021 03:45 AM    ALTSGPT 31 05/01/2021 03:45 AM    TBILIRUBIN 0.5 05/01/2021 03:45 AM      Lab Results   Component Value Date/Time    WBC 6.4 05/01/2021 03:45 AM         Cardiac Imaging and Procedures Review:      Echo 1/26/2019:  CONCLUSIONS  Saline bubble study performed, positive for shunt. with atrial septal   aneurysm.  Mildly reduced left ventricular systolic function.  Left ventricular ejection fraction is visually estimated to be 45%.  Grade I diastolic dysfunction.  Normal right ventricular size and systolic function.  No significant valve disease.   Compared to the images of the prior study done 1/24/2019 -  there has   been no significant change.     Echo 5/1/2021:  CONCLUSIONS  Normal left ventricular size, thickness, systolic function, and   diastolic function.  Left ventricular ejection fraction is visually estimated to be 60%.  Mild tricuspid regurgitation.  Right ventricular systolic pressure is estimated to be 26 mmHg.    [facility] 5/1/2021:  AORTIC VALVE:  There was no significant gradient noted.     LEFT VENTRICULOGRAM:  A 10 mL of contrast were delivered for 2 seconds.    Ejection fraction was estimated to be 40%.  There was mid to distal anterior   and apical hypokinesis noted.     ANGIOGRAM:  LEFT MAIN CORONARY ARTERY:  Left main coronary artery is a moderate length   large-caliber vessel free of disease.     LEFT ANTERIOR DESCENDING ARTERY:  Left anterior descending artery is a long   large-caliber vessel,  which wraps around the apex.  There are two patent   stents in the mid portion.  Mid to distal portion of the vessel, there is an   eccentric 30% stenosis.  There is a moderate caliber bifurcating diagonal   branch noted free of disease.     LEFT CIRCUMFLEX ARTERY:  Left circumflex artery is a nondominant large caliber   vessel with large bifurcating obtuse marginal branch.  Left circumflex artery   and its branches are free of disease.     RIGHT CORONARY ARTERY:  Right coronary artery is a dominant, large caliber   vessel with small posterior descending artery.  Right coronary artery and its   branches are free of disease.    POSTPROCEDURE DIAGNOSES:  1.  Patent stent in the mid left anterior descending artery, otherwise normal   coronary artery system.  2.  Reduced left ventricular systolic function with ejection fraction of 40%.    Global hypokinesis.  3.  Elevated left ventricular end-diastolic pressure.        Assessment and Clinical Decision Makin   Chest pain  -    Does not sound typical of his previous angina.   -    Could consider doing an exercise stress test coupled with some imaging, he had a normal stress EKG test recently, has them annually for his work.    2   CAD  -    NSTEMI in 2021 without intervention  -    Hx of stent to the mid LAD.  -    EF 60% on last echo, historically had been 40%    3   Dyslipidemia   -     2021 (Goal < 70, closer to 50 is better)  -    On Liptor 80, Zetia 10    4   PFO.    Will get back to him after my conference with the physician.      Elsie Chavez PA-C     [facility]      Addendum 2021 10:17 AM  I discussed patient's case with Dr. George in the clinic this morning.   Agrees this is an unusual case.   We do not really think repeating a stress test will yield anything helpful as he does not seem to have ischemic heart disease.  His thrombotic episode would warrant a hypercoagulable workup, will refer to  hematology/oncology.  Will also assure his cholesterol is well controlled.   Will order lipid panel and Lipo A - can be done at [facility], will send order in the mail.    Agree he can return to work.  He should call us with further CP, ED precautions discussed.    Elsie Chavez PA-C  8/31/2021

## 2021-09-04 ENCOUNTER — APPOINTMENT (OUTPATIENT)
Dept: RADIOLOGY | Facility: MEDICAL CENTER | Age: 31
End: 2021-09-04
Attending: EMERGENCY MEDICINE
Payer: COMMERCIAL

## 2021-09-04 ENCOUNTER — HOSPITAL ENCOUNTER (EMERGENCY)
Facility: MEDICAL CENTER | Age: 31
End: 2021-09-04
Attending: EMERGENCY MEDICINE
Payer: COMMERCIAL

## 2021-09-04 VITALS
HEART RATE: 78 BPM | BODY MASS INDEX: 24.16 KG/M2 | SYSTOLIC BLOOD PRESSURE: 126 MMHG | WEIGHT: 163.58 LBS | OXYGEN SATURATION: 95 % | DIASTOLIC BLOOD PRESSURE: 75 MMHG | RESPIRATION RATE: 16 BRPM | TEMPERATURE: 98.9 F

## 2021-09-04 DIAGNOSIS — R10.31 RIGHT INGUINAL PAIN: ICD-10-CM

## 2021-09-04 LAB
ANION GAP SERPL CALC-SCNC: 14 MMOL/L (ref 7–16)
APPEARANCE UR: CLEAR
BASOPHILS # BLD AUTO: 0.5 % (ref 0–1.8)
BASOPHILS # BLD: 0.04 K/UL (ref 0–0.12)
BILIRUB UR QL STRIP.AUTO: NEGATIVE
BUN SERPL-MCNC: 20 MG/DL (ref 8–22)
CALCIUM SERPL-MCNC: 9.5 MG/DL (ref 8.5–10.5)
CHLORIDE SERPL-SCNC: 101 MMOL/L (ref 96–112)
CO2 SERPL-SCNC: 24 MMOL/L (ref 20–33)
COLOR UR: YELLOW
CREAT SERPL-MCNC: 0.97 MG/DL (ref 0.5–1.4)
EOSINOPHIL # BLD AUTO: 0.07 K/UL (ref 0–0.51)
EOSINOPHIL NFR BLD: 0.9 % (ref 0–6.9)
ERYTHROCYTE [DISTWIDTH] IN BLOOD BY AUTOMATED COUNT: 38.6 FL (ref 35.9–50)
GLUCOSE SERPL-MCNC: 93 MG/DL (ref 65–99)
GLUCOSE UR STRIP.AUTO-MCNC: NEGATIVE MG/DL
HCT VFR BLD AUTO: 46.7 % (ref 42–52)
HGB BLD-MCNC: 16.2 G/DL (ref 14–18)
IMM GRANULOCYTES # BLD AUTO: 0.02 K/UL (ref 0–0.11)
IMM GRANULOCYTES NFR BLD AUTO: 0.3 % (ref 0–0.9)
KETONES UR STRIP.AUTO-MCNC: NEGATIVE MG/DL
LEUKOCYTE ESTERASE UR QL STRIP.AUTO: NEGATIVE
LYMPHOCYTES # BLD AUTO: 1.29 K/UL (ref 1–4.8)
LYMPHOCYTES NFR BLD: 16.2 % (ref 22–41)
MCH RBC QN AUTO: 30.6 PG (ref 27–33)
MCHC RBC AUTO-ENTMCNC: 34.7 G/DL (ref 33.7–35.3)
MCV RBC AUTO: 88.3 FL (ref 81.4–97.8)
MICRO URNS: NORMAL
MONOCYTES # BLD AUTO: 0.41 K/UL (ref 0–0.85)
MONOCYTES NFR BLD AUTO: 5.2 % (ref 0–13.4)
NEUTROPHILS # BLD AUTO: 6.12 K/UL (ref 1.82–7.42)
NEUTROPHILS NFR BLD: 76.9 % (ref 44–72)
NITRITE UR QL STRIP.AUTO: NEGATIVE
NRBC # BLD AUTO: 0 K/UL
NRBC BLD-RTO: 0 /100 WBC
PH UR STRIP.AUTO: 7 [PH] (ref 5–8)
PLATELET # BLD AUTO: 221 K/UL (ref 164–446)
PMV BLD AUTO: 9.5 FL (ref 9–12.9)
POTASSIUM SERPL-SCNC: 4.1 MMOL/L (ref 3.6–5.5)
PROT UR QL STRIP: NEGATIVE MG/DL
RBC # BLD AUTO: 5.29 M/UL (ref 4.7–6.1)
RBC UR QL AUTO: NEGATIVE
SODIUM SERPL-SCNC: 139 MMOL/L (ref 135–145)
SP GR UR STRIP.AUTO: 1.02
UROBILINOGEN UR STRIP.AUTO-MCNC: 0.2 MG/DL
WBC # BLD AUTO: 8 K/UL (ref 4.8–10.8)

## 2021-09-04 PROCEDURE — 80048 BASIC METABOLIC PNL TOTAL CA: CPT

## 2021-09-04 PROCEDURE — 99283 EMERGENCY DEPT VISIT LOW MDM: CPT

## 2021-09-04 PROCEDURE — 76870 US EXAM SCROTUM: CPT

## 2021-09-04 PROCEDURE — 85025 COMPLETE CBC W/AUTO DIFF WBC: CPT

## 2021-09-04 PROCEDURE — 81003 URINALYSIS AUTO W/O SCOPE: CPT

## 2021-09-04 PROCEDURE — 74177 CT ABD & PELVIS W/CONTRAST: CPT

## 2021-09-04 PROCEDURE — 700117 HCHG RX CONTRAST REV CODE 255: Performed by: EMERGENCY MEDICINE

## 2021-09-04 RX ORDER — ONDANSETRON 2 MG/ML
4 INJECTION INTRAMUSCULAR; INTRAVENOUS ONCE
Status: DISCONTINUED | OUTPATIENT
Start: 2021-09-04 | End: 2021-09-05 | Stop reason: HOSPADM

## 2021-09-04 RX ORDER — MORPHINE SULFATE 4 MG/ML
4 INJECTION, SOLUTION INTRAMUSCULAR; INTRAVENOUS ONCE
Status: DISCONTINUED | OUTPATIENT
Start: 2021-09-04 | End: 2021-09-05 | Stop reason: HOSPADM

## 2021-09-04 RX ADMIN — IOHEXOL 100 ML: 350 INJECTION, SOLUTION INTRAVENOUS at 23:15

## 2021-09-04 ASSESSMENT — FIBROSIS 4 INDEX: FIB4 SCORE: 1.9

## 2021-09-05 NOTE — ED NOTES
Pt discharged home w/ s/o, pt A&Ox4, RA, steady. Pt educated on worsening s/s, pt verbalized understanding. IV discontinued and gauze placed, pt in possession of belongings. Pt provided discharge education and information pertaining to medications and follow up appointments. Pt received copy of discharge instructions and verbalized understanding. /75   Pulse 78   Temp 37.2 °C (98.9 °F) (Temporal)   Resp 16   Wt 74.2 kg (163 lb 9.3 oz)   SpO2 95%   BMI 24.16 kg/m²

## 2021-09-05 NOTE — ED TRIAGE NOTES
.Zeeshan Hewitt  .  Chief Complaint   Patient presents with   • Groin Pain     patient c/o right groin pain radiating to right to testicles.      Patient ambulatory to triage with above complaint. Denies any trauma to area. Patient reports increase in pain with sex. Denies any penile discharge or painful urination.    Patient to lobby and instructed to inform staff of any needs.

## 2021-09-05 NOTE — ED NOTES
Pt to [facility] from [facility]. 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 Pt in gown and on monitor, call light in reach, chart up for ERP.

## 2021-09-05 NOTE — ED PROVIDER NOTES
ED Provider Note        Primary care provider: Pcp Pt States None    I verified that the patient was wearing a mask and I was wearing appropriate PPE every time I entered the room. The patient's mask was on the patient at all times during my encounter except for a brief view of the oropharynx.      CHIEF COMPLAINT  Chief Complaint   Patient presents with   • Groin Pain     patient c/o right groin pain radiating to right to testicles.        HPI  Zeeshan Hewitt is a 30 y.o. male who presents to the Emergency Department with chief complaint of right testicle and right lower groin pain.  Patient reports he is been experiencing the pain intermittently over the last week.  Seems to be getting progressively worse.  States that he is tender along the posterior aspect of the right testicle into his right groin.  He has had some minor nausea has had no chills he denies any recent change in sexual partners or unprotected sex he denies any urethral drainage or any swelling.  Pain is rated as moderate there is no other modifying factors has had normal bowel movements normal flatus no headache altered mental status cough congestion chest pain or shortness of breath.  Patient does have longstanding history of coronary artery disease status post stenting of his LAD approximately 2 years ago and had NSTEMI 4 months ago.  He follows regularly with cardiologist Dr. Barajas and is scheduled to have nuclear stress test he has no chest pain or palpitations no shortness of breath at this time.    REVIEW OF SYSTEMS  10 systems reviewed and otherwise negative, pertinent positives and negatives listed in the history of present illness.    PAST MEDICAL HISTORY   has a past medical history of PFO with atrial septal aneurysm.    SURGICAL HISTORY   has a past surgical history that includes radius ulna orif (3/17/2014).    SOCIAL HISTORY  Social History     Tobacco Use   • Smoking status: Never Smoker   • Smokeless tobacco: Never Used    Vaping Use   • Vaping Use: Never used   Substance Use Topics   • Alcohol use: Yes     Comment: occasionally   • Drug use: Yes     Types: Marijuana     Comment: weed      Social History     Substance and Sexual Activity   Drug Use Yes   • Types: Marijuana    Comment: weed       FAMILY HISTORY  Non-Contributory    CURRENT MEDICATIONS  Home Medications     Reviewed by Bal Morton R.N. (Registered Nurse) on 09/04/21 at 1906  Med List Status: Complete   Medication Last Dose Status   acetaminophen (TYLENOL) 500 MG Tab  Active   aspirin EC 81 MG EC tablet 9/4/2021 Active   atorvastatin (LIPITOR) 80 MG tablet 9/3/2021 Active   clopidogrel (PLAVIX) 75 MG Tab 9/4/2021 Active   ezetimibe (ZETIA) 10 MG Tab 9/4/2021 Active                ALLERGIES  No Known Allergies    PHYSICAL EXAM  VITAL SIGNS: /82   Pulse 82   Temp 37.3 °C (99.2 °F) (Temporal)   Resp 16   Wt 74.2 kg (163 lb 9.3 oz)   SpO2 95%   BMI 24.16 kg/m²   Pulse ox interpretation: I interpret this pulse ox as normal.  Constitutional: Alert and oriented x 3, minimal distress  HEENT: Atraumatic normocephalic, pupils are equal round, extraocular movements are intact. The nares is clear, external ears are normal, mouth shows moist mucous membranes  Neck: no obvious JVD or tracheal deviation  Cardiovascular: Regular rate and rhythm no murmur rub or gallop   Thorax & Lungs: No respiratory distress, no wheezes rales or rhonchi, No chest tenderness.   GI: Soft nontender nondistended positive bowel sounds, no peritoneal signs  : Minimal tenderness along the posterior aspect of the right testicle with radiation in the right inguinal canal there is no obvious inguinal canal defect however there is tenderness over the inguinal canal.  There is no redness no warmth no urethral drainage.  Skin: Warm dry no obvious acute rash or lesion  Musculoskeletal: Moving all extremities with normal range strength, no acute  deformity  Neurologic: Cranial nerves III through  XII are grossly intact, no sensory deficit, no cerebellar dysfunction   Psychiatric: Appropriate affect for situation at this time      DIAGNOSTIC STUDIES / PROCEDURES  LABS      Results for orders placed or performed during the hospital encounter of 09/04/21   CBC WITH DIFFERENTIAL   Result Value Ref Range    WBC 8.0 4.8 - 10.8 K/uL    RBC 5.29 4.70 - 6.10 M/uL    Hemoglobin 16.2 14.0 - 18.0 g/dL    Hematocrit 46.7 42.0 - 52.0 %    MCV 88.3 81.4 - 97.8 fL    MCH 30.6 27.0 - 33.0 pg    MCHC 34.7 33.7 - 35.3 g/dL    RDW 38.6 35.9 - 50.0 fL    Platelet Count 221 164 - 446 K/uL    MPV 9.5 9.0 - 12.9 fL    Neutrophils-Polys 76.90 (H) 44.00 - 72.00 %    Lymphocytes 16.20 (L) 22.00 - 41.00 %    Monocytes 5.20 0.00 - 13.40 %    Eosinophils 0.90 0.00 - 6.90 %    Basophils 0.50 0.00 - 1.80 %    Immature Granulocytes 0.30 0.00 - 0.90 %    Nucleated RBC 0.00 /100 WBC    Neutrophils (Absolute) 6.12 1.82 - 7.42 K/uL    Lymphs (Absolute) 1.29 1.00 - 4.80 K/uL    Monos (Absolute) 0.41 0.00 - 0.85 K/uL    Eos (Absolute) 0.07 0.00 - 0.51 K/uL    Baso (Absolute) 0.04 0.00 - 0.12 K/uL    Immature Granulocytes (abs) 0.02 0.00 - 0.11 K/uL    NRBC (Absolute) 0.00 K/uL   BASIC METABOLIC PANEL   Result Value Ref Range    Sodium 139 135 - 145 mmol/L    Potassium 4.1 3.6 - 5.5 mmol/L    Chloride 101 96 - 112 mmol/L    Co2 24 20 - 33 mmol/L    Glucose 93 65 - 99 mg/dL    Bun 20 8 - 22 mg/dL    Creatinine 0.97 0.50 - 1.40 mg/dL    Calcium 9.5 8.5 - 10.5 mg/dL    Anion Gap 14.0 7.0 - 16.0   URINALYSIS CULTURE, IF INDICATED    Specimen: Urine   Result Value Ref Range    Color Yellow     Character Clear     Specific Gravity 1.017 <1.035    Ph 7.0 5.0 - 8.0    Glucose Negative Negative mg/dL    Ketones Negative Negative mg/dL    Protein Negative Negative mg/dL    Bilirubin Negative Negative    Urobilinogen, Urine 0.2 Negative    Nitrite Negative Negative    Leukocyte Esterase Negative Negative    Occult Blood Negative Negative    Micro Urine Req  see below    ESTIMATED GFR   Result Value Ref Range    GFR If African American >60 >60 mL/min/1.73 m 2    GFR If Non African American >60 >60 mL/min/1.73 m 2       All labs reviewed by me.        RADIOLOGY  CT-ABDOMEN-PELVIS WITH   Final Result      1.  No evidence of bowel obstruction or focal inflammatory change.      2.  Mild fatty change of the liver.      3.  Mild splenomegaly.      4.  2 x 1.5 cm calcification within the right lobe the liver likely representing postinflammatory change.            [id number]   Final Result      No evidence of testicular mass or torsion.            COURSE & MEDICAL DECISION MAKING  Pertinent Labs & Imaging studies reviewed. (See chart for details)    8:19 PM - Patient seen and examined at bedside.         Patient noted to have slightly elevated blood pressure likely circumstantial secondary to presenting complaint. Referred to primary care physician for further evaluation.    Medical Decision Making: Ultrasounds unremarkable his pain is controlled here patient denied pain medicine labs are all reassuring.  His physical exam is reassuring with the pain that he was having and normal ultrasound we were concerned with possible intra-abdominal pathology versus hernia CT was performed which showed calcified lesion in the liver as well as some mild fatty liver mild splenomegaly otherwise unremarkable patient is given instructions return here for worsening pain swelling redness discharge any other acute symptoms or concerns otherwise discharged in stable and improved condition.    /82   Pulse 82   Temp 37.3 °C (99.2 °F) (Temporal)   Resp 16   Wt 74.2 kg (163 lb 9.3 oz)   SpO2 95%   BMI 24.16 kg/m²     [address] [address] [phone]  Schedule an appointment as soon as possible for a visit   for establishment of primary care, for blood pressure management    [facility], Emergency Dept  [address]  [place]  [place] [id number] [phone]    in 12-24 hours if symptoms persist, immediately If symptoms worsen, or if you develop any other symptoms or concerns      New Prescriptions    No medications on file       FINAL IMPRESSION  1. Right inguinal pain           This dictation has been created using voice recognition software and/or scribes. The accuracy of the dictation is limited by the abilities of the software and the expertise of the scribes. I expect there may be some errors of grammar and possibly content. I made every attempt to manually correct the errors within my dictation. However, errors related to voice recognition software and/or scribes may still exist and should be interpreted within the appropriate context.

## 2021-11-04 ENCOUNTER — APPOINTMENT (OUTPATIENT)
Dept: CARDIOLOGY | Facility: MEDICAL CENTER | Age: 31
End: 2021-11-04
Payer: COMMERCIAL

## 2025-05-19 ENCOUNTER — APPOINTMENT (OUTPATIENT)
Dept: CARDIOLOGY | Facility: MEDICAL CENTER | Age: 35
DRG: 251 | End: 2025-05-19
Attending: EMERGENCY MEDICINE
Payer: COMMERCIAL

## 2025-05-19 ENCOUNTER — HOSPITAL ENCOUNTER (INPATIENT)
Facility: MEDICAL CENTER | Age: 35
LOS: 2 days | DRG: 251 | End: 2025-05-21
Attending: EMERGENCY MEDICINE | Admitting: INTERNAL MEDICINE
Payer: COMMERCIAL

## 2025-05-19 DIAGNOSIS — I21.4 NSTEMI (NON-ST ELEVATED MYOCARDIAL INFARCTION) (HCC): ICD-10-CM

## 2025-05-19 DIAGNOSIS — I21.19 ST ELEVATION MYOCARDIAL INFARCTION (STEMI) INVOLVING OTHER CORONARY ARTERY OF INFERIOR WALL (HCC): Primary | ICD-10-CM

## 2025-05-19 DIAGNOSIS — D68.59 PROTEIN C DEFICIENCY (HCC): ICD-10-CM

## 2025-05-19 PROBLEM — I51.9 SYSTOLIC DYSFUNCTION: Status: ACTIVE | Noted: 2025-05-19

## 2025-05-19 LAB
ACT BLD: 216 SEC (ref 74–137)
ACT BLD: 251 SEC (ref 74–137)
ACT BLD: 285 SEC (ref 74–137)
APTT PPP: 157.1 SEC (ref 24.7–36)
APTT PPP: 32.6 SEC (ref 24.7–36)
HOLDING TUBE BB 8507: NORMAL
INR PPP: 0.98 (ref 0.87–1.13)
INR PPP: 1.06 (ref 0.87–1.13)
PROTHROMBIN TIME: 13 SEC (ref 12–14.6)
PROTHROMBIN TIME: 13.8 SEC (ref 12–14.6)
UFH PPP CHRO-ACNC: 0.73 IU/ML
UFH PPP CHRO-ACNC: <0.1 IU/ML

## 2025-05-19 PROCEDURE — A9270 NON-COVERED ITEM OR SERVICE: HCPCS | Performed by: INTERNAL MEDICINE

## 2025-05-19 PROCEDURE — 770000 HCHG ROOM/CARE - INTERMEDIATE ICU *

## 2025-05-19 PROCEDURE — 36415 COLL VENOUS BLD VENIPUNCTURE: CPT

## 2025-05-19 PROCEDURE — 700102 HCHG RX REV CODE 250 W/ 637 OVERRIDE(OP): Performed by: INTERNAL MEDICINE

## 2025-05-19 PROCEDURE — 99285 EMERGENCY DEPT VISIT HI MDM: CPT

## 2025-05-19 PROCEDURE — 99223 1ST HOSP IP/OBS HIGH 75: CPT | Mod: 25 | Performed by: INTERNAL MEDICINE

## 2025-05-19 PROCEDURE — 700111 HCHG RX REV CODE 636 W/ 250 OVERRIDE (IP)

## 2025-05-19 PROCEDURE — 96374 THER/PROPH/DIAG INJ IV PUSH: CPT

## 2025-05-19 PROCEDURE — 92978 ENDOLUMINL IVUS OCT C 1ST: CPT | Mod: 26,RC | Performed by: INTERNAL MEDICINE

## 2025-05-19 PROCEDURE — 700111 HCHG RX REV CODE 636 W/ 250 OVERRIDE (IP): Mod: JZ | Performed by: EMERGENCY MEDICINE

## 2025-05-19 PROCEDURE — B240ZZ3 ULTRASONOGRAPHY OF SINGLE CORONARY ARTERY, INTRAVASCULAR: ICD-10-PCS | Performed by: INTERNAL MEDICINE

## 2025-05-19 PROCEDURE — 99291 CRITICAL CARE FIRST HOUR: CPT | Performed by: INTERNAL MEDICINE

## 2025-05-19 PROCEDURE — 02C03ZZ EXTIRPATION OF MATTER FROM CORONARY ARTERY, ONE ARTERY, PERCUTANEOUS APPROACH: ICD-10-PCS | Performed by: INTERNAL MEDICINE

## 2025-05-19 PROCEDURE — 92941 PRQ TRLML REVSC TOT OCCL AMI: CPT | Mod: RC | Performed by: INTERNAL MEDICINE

## 2025-05-19 PROCEDURE — 85347 COAGULATION TIME ACTIVATED: CPT | Mod: 91

## 2025-05-19 PROCEDURE — B2151ZZ FLUOROSCOPY OF LEFT HEART USING LOW OSMOLAR CONTRAST: ICD-10-PCS | Performed by: INTERNAL MEDICINE

## 2025-05-19 PROCEDURE — 99152 MOD SED SAME PHYS/QHP 5/>YRS: CPT | Performed by: INTERNAL MEDICINE

## 2025-05-19 PROCEDURE — 700101 HCHG RX REV CODE 250

## 2025-05-19 PROCEDURE — 700111 HCHG RX REV CODE 636 W/ 250 OVERRIDE (IP): Mod: JZ | Performed by: INTERNAL MEDICINE

## 2025-05-19 PROCEDURE — 85520 HEPARIN ASSAY: CPT

## 2025-05-19 PROCEDURE — 4A023N7 MEASUREMENT OF CARDIAC SAMPLING AND PRESSURE, LEFT HEART, PERCUTANEOUS APPROACH: ICD-10-PCS | Performed by: INTERNAL MEDICINE

## 2025-05-19 PROCEDURE — 99153 MOD SED SAME PHYS/QHP EA: CPT

## 2025-05-19 PROCEDURE — 85610 PROTHROMBIN TIME: CPT

## 2025-05-19 PROCEDURE — 93458 L HRT ARTERY/VENTRICLE ANGIO: CPT | Mod: 26,59 | Performed by: INTERNAL MEDICINE

## 2025-05-19 PROCEDURE — B2111ZZ FLUOROSCOPY OF MULTIPLE CORONARY ARTERIES USING LOW OSMOLAR CONTRAST: ICD-10-PCS | Performed by: INTERNAL MEDICINE

## 2025-05-19 PROCEDURE — 85730 THROMBOPLASTIN TIME PARTIAL: CPT

## 2025-05-19 RX ORDER — METOPROLOL TARTRATE 25 MG/1
25 TABLET, FILM COATED ORAL TWICE DAILY
Status: DISCONTINUED | OUTPATIENT
Start: 2025-05-19 | End: 2025-05-21 | Stop reason: HOSPADM

## 2025-05-19 RX ORDER — EPTIFIBATIDE 2 MG/ML
INJECTION, SOLUTION INTRAVENOUS
Status: COMPLETED
Start: 2025-05-19 | End: 2025-05-19

## 2025-05-19 RX ORDER — MIDAZOLAM HYDROCHLORIDE 1 MG/ML
INJECTION INTRAMUSCULAR; INTRAVENOUS
Status: COMPLETED
Start: 2025-05-19 | End: 2025-05-19

## 2025-05-19 RX ORDER — MORPHINE SULFATE 4 MG/ML
4 INJECTION INTRAVENOUS
Status: DISCONTINUED | OUTPATIENT
Start: 2025-05-19 | End: 2025-05-21 | Stop reason: HOSPADM

## 2025-05-19 RX ORDER — POLYETHYLENE GLYCOL 3350 17 G/17G
1 POWDER, FOR SOLUTION ORAL
Status: DISCONTINUED | OUTPATIENT
Start: 2025-05-19 | End: 2025-05-21 | Stop reason: HOSPADM

## 2025-05-19 RX ORDER — ATORVASTATIN CALCIUM 40 MG/1
40 TABLET, FILM COATED ORAL
Status: DISCONTINUED | OUTPATIENT
Start: 2025-05-19 | End: 2025-05-19

## 2025-05-19 RX ORDER — ONDANSETRON 2 MG/ML
4 INJECTION INTRAMUSCULAR; INTRAVENOUS EVERY 4 HOURS PRN
Status: DISCONTINUED | OUTPATIENT
Start: 2025-05-19 | End: 2025-05-21 | Stop reason: HOSPADM

## 2025-05-19 RX ORDER — EPTIFIBATIDE 0.75 MG/ML
2 INJECTION, SOLUTION INTRAVENOUS CONTINUOUS
Status: DISCONTINUED | OUTPATIENT
Start: 2025-05-19 | End: 2025-05-21

## 2025-05-19 RX ORDER — ASPIRIN 81 MG/1
81 TABLET ORAL DAILY
Status: DISCONTINUED | OUTPATIENT
Start: 2025-05-20 | End: 2025-05-19

## 2025-05-19 RX ORDER — HEPARIN SODIUM 5000 [USP'U]/100ML
0-30 INJECTION, SOLUTION INTRAVENOUS CONTINUOUS
Status: DISCONTINUED | OUTPATIENT
Start: 2025-05-19 | End: 2025-05-19

## 2025-05-19 RX ORDER — HEPARIN SODIUM 1000 [USP'U]/ML
INJECTION, SOLUTION INTRAVENOUS; SUBCUTANEOUS
Status: COMPLETED
Start: 2025-05-19 | End: 2025-05-19

## 2025-05-19 RX ORDER — PROCHLORPERAZINE EDISYLATE 5 MG/ML
5-10 INJECTION INTRAMUSCULAR; INTRAVENOUS EVERY 4 HOURS PRN
Status: DISCONTINUED | OUTPATIENT
Start: 2025-05-19 | End: 2025-05-21 | Stop reason: HOSPADM

## 2025-05-19 RX ORDER — CLOPIDOGREL BISULFATE 75 MG/1
75 TABLET ORAL DAILY
Status: DISCONTINUED | OUTPATIENT
Start: 2025-05-20 | End: 2025-05-19

## 2025-05-19 RX ORDER — VERAPAMIL HYDROCHLORIDE 2.5 MG/ML
INJECTION INTRAVENOUS
Status: COMPLETED
Start: 2025-05-19 | End: 2025-05-19

## 2025-05-19 RX ORDER — HEPARIN SODIUM 1000 [USP'U]/ML
2000 INJECTION, SOLUTION INTRAVENOUS; SUBCUTANEOUS PRN
Status: DISCONTINUED | OUTPATIENT
Start: 2025-05-20 | End: 2025-05-21

## 2025-05-19 RX ORDER — OXYCODONE HYDROCHLORIDE 5 MG/1
5 TABLET ORAL
Refills: 0 | Status: DISCONTINUED | OUTPATIENT
Start: 2025-05-19 | End: 2025-05-21 | Stop reason: HOSPADM

## 2025-05-19 RX ORDER — EPTIFIBATIDE 0.75 MG/ML
INJECTION, SOLUTION INTRAVENOUS
Status: COMPLETED
Start: 2025-05-19 | End: 2025-05-19

## 2025-05-19 RX ORDER — ACETAMINOPHEN 325 MG/1
650 TABLET ORAL EVERY 6 HOURS PRN
Status: DISCONTINUED | OUTPATIENT
Start: 2025-05-19 | End: 2025-05-21 | Stop reason: HOSPADM

## 2025-05-19 RX ORDER — AMOXICILLIN 250 MG
2 CAPSULE ORAL EVERY EVENING
Status: DISCONTINUED | OUTPATIENT
Start: 2025-05-19 | End: 2025-05-21 | Stop reason: HOSPADM

## 2025-05-19 RX ORDER — EZETIMIBE 10 MG/1
10 TABLET ORAL DAILY
Status: DISCONTINUED | OUTPATIENT
Start: 2025-05-20 | End: 2025-05-21 | Stop reason: HOSPADM

## 2025-05-19 RX ORDER — LIDOCAINE HYDROCHLORIDE 20 MG/ML
INJECTION, SOLUTION INFILTRATION; PERINEURAL
Status: COMPLETED
Start: 2025-05-19 | End: 2025-05-19

## 2025-05-19 RX ORDER — PROMETHAZINE HYDROCHLORIDE 25 MG/1
12.5-25 TABLET ORAL EVERY 4 HOURS PRN
Status: DISCONTINUED | OUTPATIENT
Start: 2025-05-19 | End: 2025-05-21 | Stop reason: HOSPADM

## 2025-05-19 RX ORDER — CLOPIDOGREL BISULFATE 75 MG/1
75 TABLET ORAL DAILY
Status: DISCONTINUED | OUTPATIENT
Start: 2025-05-20 | End: 2025-05-21 | Stop reason: HOSPADM

## 2025-05-19 RX ORDER — HEPARIN SODIUM 1000 [USP'U]/ML
30 INJECTION, SOLUTION INTRAVENOUS; SUBCUTANEOUS PRN
Status: DISCONTINUED | OUTPATIENT
Start: 2025-05-19 | End: 2025-05-19

## 2025-05-19 RX ORDER — PROMETHAZINE HYDROCHLORIDE 25 MG/1
12.5-25 SUPPOSITORY RECTAL EVERY 4 HOURS PRN
Status: DISCONTINUED | OUTPATIENT
Start: 2025-05-19 | End: 2025-05-21 | Stop reason: HOSPADM

## 2025-05-19 RX ORDER — ONDANSETRON 4 MG/1
4 TABLET, ORALLY DISINTEGRATING ORAL EVERY 4 HOURS PRN
Status: DISCONTINUED | OUTPATIENT
Start: 2025-05-19 | End: 2025-05-21 | Stop reason: HOSPADM

## 2025-05-19 RX ORDER — OXYCODONE HYDROCHLORIDE 10 MG/1
10 TABLET ORAL
Refills: 0 | Status: DISCONTINUED | OUTPATIENT
Start: 2025-05-19 | End: 2025-05-21 | Stop reason: HOSPADM

## 2025-05-19 RX ORDER — ATORVASTATIN CALCIUM 80 MG/1
80 TABLET, FILM COATED ORAL EVERY EVENING
Status: DISCONTINUED | OUTPATIENT
Start: 2025-05-19 | End: 2025-05-21 | Stop reason: HOSPADM

## 2025-05-19 RX ORDER — HEPARIN SODIUM 5000 [USP'U]/100ML
0-30 INJECTION, SOLUTION INTRAVENOUS CONTINUOUS
Status: DISCONTINUED | OUTPATIENT
Start: 2025-05-19 | End: 2025-05-21

## 2025-05-19 RX ORDER — ASPIRIN 81 MG/1
81 TABLET ORAL DAILY
Status: DISCONTINUED | OUTPATIENT
Start: 2025-05-20 | End: 2025-05-21

## 2025-05-19 RX ORDER — HEPARIN SODIUM 200 [USP'U]/100ML
INJECTION, SOLUTION INTRAVENOUS
Status: COMPLETED
Start: 2025-05-19 | End: 2025-05-19

## 2025-05-19 RX ORDER — LABETALOL HYDROCHLORIDE 5 MG/ML
10 INJECTION, SOLUTION INTRAVENOUS EVERY 4 HOURS PRN
Status: DISCONTINUED | OUTPATIENT
Start: 2025-05-19 | End: 2025-05-21 | Stop reason: HOSPADM

## 2025-05-19 RX ORDER — SODIUM CHLORIDE 9 MG/ML
3 INJECTION, SOLUTION INTRAVENOUS CONTINUOUS
Status: ACTIVE | OUTPATIENT
Start: 2025-05-19 | End: 2025-05-20

## 2025-05-19 RX ADMIN — EPTIFIBATIDE 13.4 MG: 0.75 INJECTION, SOLUTION INTRAVENOUS at 19:08

## 2025-05-19 RX ADMIN — MIDAZOLAM HYDROCHLORIDE 2 MG: 1 INJECTION, SOLUTION INTRAMUSCULAR; INTRAVENOUS at 18:42

## 2025-05-19 RX ADMIN — ATORVASTATIN CALCIUM 80 MG: 80 TABLET, FILM COATED ORAL at 20:52

## 2025-05-19 RX ADMIN — OXYCODONE 5 MG: 5 TABLET ORAL at 21:30

## 2025-05-19 RX ADMIN — LIDOCAINE HYDROCHLORIDE: 20 INJECTION, SOLUTION INFILTRATION; PERINEURAL at 18:29

## 2025-05-19 RX ADMIN — EPTIFIBATIDE 2 MCG/KG/MIN: 0.75 INJECTION, SOLUTION INTRAVENOUS at 23:00

## 2025-05-19 RX ADMIN — EPTIFIBATIDE 14.4 MG: 2 INJECTION, SOLUTION INTRAVENOUS at 18:54

## 2025-05-19 RX ADMIN — EPTIFIBATIDE 14.4 MG: 2 INJECTION, SOLUTION INTRAVENOUS at 19:05

## 2025-05-19 RX ADMIN — MIDAZOLAM HYDROCHLORIDE 2 MG: 1 INJECTION, SOLUTION INTRAMUSCULAR; INTRAVENOUS at 19:21

## 2025-05-19 RX ADMIN — ONDANSETRON 4 MG: 2 INJECTION INTRAMUSCULAR; INTRAVENOUS at 22:35

## 2025-05-19 RX ADMIN — HEPARIN SODIUM 12 UNITS/KG/HR: 5000 INJECTION, SOLUTION INTRAVENOUS at 20:15

## 2025-05-19 RX ADMIN — HEPARIN SODIUM: 1000 INJECTION, SOLUTION INTRAVENOUS; SUBCUTANEOUS at 19:00

## 2025-05-19 RX ADMIN — HEPARIN SODIUM 2000 UNITS: 200 INJECTION, SOLUTION INTRAVENOUS at 18:32

## 2025-05-19 RX ADMIN — FENTANYL CITRATE: 50 INJECTION, SOLUTION INTRAMUSCULAR; INTRAVENOUS at 18:45

## 2025-05-19 RX ADMIN — HEPARIN SODIUM: 1000 INJECTION, SOLUTION INTRAVENOUS; SUBCUTANEOUS at 18:29

## 2025-05-19 RX ADMIN — HEPARIN SODIUM 12 UNITS/KG/HR: 5000 INJECTION, SOLUTION INTRAVENOUS at 18:15

## 2025-05-19 RX ADMIN — NITROGLYCERIN 0.5 INCH: 20 OINTMENT TOPICAL at 23:56

## 2025-05-19 RX ADMIN — METOPROLOL TARTRATE 25 MG: 25 TABLET, FILM COATED ORAL at 23:56

## 2025-05-19 RX ADMIN — NITROGLYCERIN 10 ML: 20 INJECTION INTRAVENOUS at 18:30

## 2025-05-19 RX ADMIN — FENTANYL CITRATE 100 MCG: 50 INJECTION, SOLUTION INTRAMUSCULAR; INTRAVENOUS at 18:42

## 2025-05-19 RX ADMIN — VERAPAMIL HYDROCHLORIDE 2.5 MG: 2.5 INJECTION, SOLUTION INTRAVENOUS at 18:29

## 2025-05-19 SDOH — ECONOMIC STABILITY: TRANSPORTATION INSECURITY
IN THE PAST 12 MONTHS, HAS THE LACK OF TRANSPORTATION KEPT YOU FROM MEDICAL APPOINTMENTS OR FROM GETTING MEDICATIONS?: NO

## 2025-05-19 SDOH — ECONOMIC STABILITY: TRANSPORTATION INSECURITY
IN THE PAST 12 MONTHS, HAS LACK OF RELIABLE TRANSPORTATION KEPT YOU FROM MEDICAL APPOINTMENTS, MEETINGS, WORK OR FROM GETTING THINGS NEEDED FOR DAILY LIVING?: NO

## 2025-05-19 ASSESSMENT — ENCOUNTER SYMPTOMS
FOCAL WEAKNESS: 0
CHILLS: 0
VOMITING: 0
TREMORS: 0
NECK PAIN: 0
HEARTBURN: 0
SPEECH CHANGE: 0
HALLUCINATIONS: 0
HEADACHES: 0
ORTHOPNEA: 0
BLURRED VISION: 0
NERVOUS/ANXIOUS: 0
NAUSEA: 0
SPUTUM PRODUCTION: 0
PHOTOPHOBIA: 0
FEVER: 0
BRUISES/BLEEDS EASILY: 0
PALPITATIONS: 0
FLANK PAIN: 0
WEIGHT LOSS: 0
DOUBLE VISION: 0
COUGH: 0
BACK PAIN: 0
POLYDIPSIA: 0
HEMOPTYSIS: 0

## 2025-05-19 ASSESSMENT — LIFESTYLE VARIABLES
TOTAL SCORE: 0
HOW MANY TIMES IN THE PAST YEAR HAVE YOU HAD 5 OR MORE DRINKS IN A DAY: 0
HAVE PEOPLE ANNOYED YOU BY CRITICIZING YOUR DRINKING: NO
TOTAL SCORE: 0
CONSUMPTION TOTAL: NEGATIVE
HAVE YOU EVER FELT YOU SHOULD CUT DOWN ON YOUR DRINKING: NO
ON A TYPICAL DAY WHEN YOU DRINK ALCOHOL HOW MANY DRINKS DO YOU HAVE: 0
SUBSTANCE_ABUSE: 0
AVERAGE NUMBER OF DAYS PER WEEK YOU HAVE A DRINK CONTAINING ALCOHOL: 0
ALCOHOL_USE: NO
EVER HAD A DRINK FIRST THING IN THE MORNING TO STEADY YOUR NERVES TO GET RID OF A HANGOVER: NO
EVER FELT BAD OR GUILTY ABOUT YOUR DRINKING: NO
TOTAL SCORE: 0
DOES PATIENT WANT TO STOP DRINKING: NO

## 2025-05-19 ASSESSMENT — PAIN DESCRIPTION - PAIN TYPE
TYPE: ACUTE PAIN
TYPE: ACUTE PAIN

## 2025-05-19 ASSESSMENT — COGNITIVE AND FUNCTIONAL STATUS - GENERAL
MOBILITY SCORE: 24
DAILY ACTIVITIY SCORE: 24
SUGGESTED CMS G CODE MODIFIER DAILY ACTIVITY: CH
SUGGESTED CMS G CODE MODIFIER MOBILITY: CH

## 2025-05-19 ASSESSMENT — SOCIAL DETERMINANTS OF HEALTH (SDOH)
WITHIN THE PAST 12 MONTHS, THE FOOD YOU BOUGHT JUST DIDN'T LAST AND YOU DIDN'T HAVE MONEY TO GET MORE: NEVER TRUE
WITHIN THE PAST 12 MONTHS, YOU WORRIED THAT YOUR FOOD WOULD RUN OUT BEFORE YOU GOT THE MONEY TO BUY MORE: NEVER TRUE
WITHIN THE LAST YEAR, HAVE YOU BEEN KICKED, HIT, SLAPPED, OR OTHERWISE PHYSICALLY HURT BY YOUR PARTNER OR EX-PARTNER?: NO
IN THE PAST 12 MONTHS, HAS THE ELECTRIC, GAS, OIL, OR WATER COMPANY THREATENED TO SHUT OFF SERVICE IN YOUR HOME?: NO
WITHIN THE LAST YEAR, HAVE YOU BEEN HUMILIATED OR EMOTIONALLY ABUSED IN OTHER WAYS BY YOUR PARTNER OR EX-PARTNER?: NO
WITHIN THE LAST YEAR, HAVE YOU BEEN AFRAID OF YOUR PARTNER OR EX-PARTNER?: NO
WITHIN THE LAST YEAR, HAVE TO BEEN RAPED OR FORCED TO HAVE ANY KIND OF SEXUAL ACTIVITY BY YOUR PARTNER OR EX-PARTNER?: NO

## 2025-05-19 ASSESSMENT — PATIENT HEALTH QUESTIONNAIRE - PHQ9
SUM OF ALL RESPONSES TO PHQ9 QUESTIONS 1 AND 2: 0
1. LITTLE INTEREST OR PLEASURE IN DOING THINGS: NOT AT ALL
2. FEELING DOWN, DEPRESSED, IRRITABLE, OR HOPELESS: NOT AT ALL

## 2025-05-19 NOTE — PROGRESS NOTES
Called at approximately 3:45 PM on 5/19/2025 for acute ST elevation myocardial infarction at [facility].  EKG was reviewed and found to have significant ST elevation inferiorly with reciprocal changes anteriorly and concern for lateral infarct as well.  Discussed the case with Dr. Mar at [facility] in which the patient presented with worsening jaw pain concerning for acute coronary syndrome.  Patient took four 325 mg aspirin tablets prior to arrival and had been taking aspirin therapy daily in addition to Plavix 75 mg daily.  He has known protein C deficiency with prior evidence of stent placement to the mid LAD in 2019 with subsequent NSTEMI in 2021 without subsequent intervention at that time.  Based on his significant aspirin ingestion and not having any further active chest pain symptoms.  The decision was made to hold off on lytic therapy in favor of heparin continuous infusion.  Patient did receive Plavix 300 mg x 1 and will also receive a heparin bolus.  Case was also discussed with Dr. Petty of the interventional cardiology service.  Patient is pending arrival by air ambulance

## 2025-05-20 ENCOUNTER — APPOINTMENT (OUTPATIENT)
Dept: CARDIOLOGY | Facility: MEDICAL CENTER | Age: 35
DRG: 251 | End: 2025-05-20
Attending: INTERNAL MEDICINE
Payer: COMMERCIAL

## 2025-05-20 LAB
ALBUMIN SERPL BCP-MCNC: 3.7 G/DL (ref 3.2–4.9)
ALBUMIN/GLOB SERPL: 1.7 G/DL
ALP SERPL-CCNC: 65 U/L (ref 30–99)
ALT SERPL-CCNC: 39 U/L (ref 2–50)
ANION GAP SERPL CALC-SCNC: 10 MMOL/L (ref 7–16)
AST SERPL-CCNC: 161 U/L (ref 12–45)
BASOPHILS # BLD AUTO: 0.4 % (ref 0–1.8)
BASOPHILS # BLD: 0.04 K/UL (ref 0–0.12)
BILIRUB SERPL-MCNC: 0.4 MG/DL (ref 0.1–1.5)
BUN SERPL-MCNC: 15 MG/DL (ref 8–22)
CALCIUM ALBUM COR SERPL-MCNC: 8.2 MG/DL (ref 8.5–10.5)
CALCIUM SERPL-MCNC: 8 MG/DL (ref 8.5–10.5)
CHLORIDE SERPL-SCNC: 105 MMOL/L (ref 96–112)
CO2 SERPL-SCNC: 22 MMOL/L (ref 20–33)
CREAT SERPL-MCNC: 0.94 MG/DL (ref 0.5–1.4)
EOSINOPHIL # BLD AUTO: 0.09 K/UL (ref 0–0.51)
EOSINOPHIL NFR BLD: 1 % (ref 0–6.9)
ERYTHROCYTE [DISTWIDTH] IN BLOOD BY AUTOMATED COUNT: 40.6 FL (ref 35.9–50)
GFR SERPLBLD CREATININE-BSD FMLA CKD-EPI: 109 ML/MIN/1.73 M 2
GLOBULIN SER CALC-MCNC: 2.2 G/DL (ref 1.9–3.5)
GLUCOSE SERPL-MCNC: 124 MG/DL (ref 65–99)
HCT VFR BLD AUTO: 38.9 % (ref 42–52)
HGB BLD-MCNC: 13.6 G/DL (ref 14–18)
IMM GRANULOCYTES # BLD AUTO: 0.03 K/UL (ref 0–0.11)
IMM GRANULOCYTES NFR BLD AUTO: 0.3 % (ref 0–0.9)
LV EJECT FRACT MOD 2C 99903: 41.43
LV EJECT FRACT MOD 4C 99902: 60.81
LV EJECT FRACT MOD BP 99901: 51.3
LYMPHOCYTES # BLD AUTO: 0.84 K/UL (ref 1–4.8)
LYMPHOCYTES NFR BLD: 9.3 % (ref 22–41)
MCH RBC QN AUTO: 30.6 PG (ref 27–33)
MCHC RBC AUTO-ENTMCNC: 35 G/DL (ref 32.3–36.5)
MCV RBC AUTO: 87.4 FL (ref 81.4–97.8)
MONOCYTES # BLD AUTO: 0.54 K/UL (ref 0–0.85)
MONOCYTES NFR BLD AUTO: 6 % (ref 0–13.4)
NEUTROPHILS # BLD AUTO: 7.53 K/UL (ref 1.82–7.42)
NEUTROPHILS NFR BLD: 83 % (ref 44–72)
NRBC # BLD AUTO: 0 K/UL
NRBC BLD-RTO: 0 /100 WBC (ref 0–0.2)
PLATELET # BLD AUTO: 220 K/UL (ref 164–446)
PMV BLD AUTO: 9.7 FL (ref 9–12.9)
POTASSIUM SERPL-SCNC: 3.9 MMOL/L (ref 3.6–5.5)
PROT SERPL-MCNC: 5.9 G/DL (ref 6–8.2)
RBC # BLD AUTO: 4.45 M/UL (ref 4.7–6.1)
SODIUM SERPL-SCNC: 137 MMOL/L (ref 135–145)
UFH PPP CHRO-ACNC: 0.29 IU/ML
UFH PPP CHRO-ACNC: 0.36 IU/ML
UFH PPP CHRO-ACNC: 0.54 IU/ML
WBC # BLD AUTO: 9.1 K/UL (ref 4.8–10.8)

## 2025-05-20 PROCEDURE — 99291 CRITICAL CARE FIRST HOUR: CPT | Performed by: INTERNAL MEDICINE

## 2025-05-20 PROCEDURE — 700111 HCHG RX REV CODE 636 W/ 250 OVERRIDE (IP): Performed by: INTERNAL MEDICINE

## 2025-05-20 PROCEDURE — 700117 HCHG RX CONTRAST REV CODE 255: Performed by: INTERNAL MEDICINE

## 2025-05-20 PROCEDURE — 93306 TTE W/DOPPLER COMPLETE: CPT | Mod: 26 | Performed by: INTERNAL MEDICINE

## 2025-05-20 PROCEDURE — 85520 HEPARIN ASSAY: CPT | Mod: 91

## 2025-05-20 PROCEDURE — 80053 COMPREHEN METABOLIC PANEL: CPT

## 2025-05-20 PROCEDURE — A9270 NON-COVERED ITEM OR SERVICE: HCPCS | Performed by: INTERNAL MEDICINE

## 2025-05-20 PROCEDURE — 99233 SBSQ HOSP IP/OBS HIGH 50: CPT | Performed by: INTERNAL MEDICINE

## 2025-05-20 PROCEDURE — 93306 TTE W/DOPPLER COMPLETE: CPT

## 2025-05-20 PROCEDURE — 700102 HCHG RX REV CODE 250 W/ 637 OVERRIDE(OP): Performed by: INTERNAL MEDICINE

## 2025-05-20 PROCEDURE — 700105 HCHG RX REV CODE 258: Performed by: INTERNAL MEDICINE

## 2025-05-20 PROCEDURE — 700111 HCHG RX REV CODE 636 W/ 250 OVERRIDE (IP): Mod: JZ | Performed by: INTERNAL MEDICINE

## 2025-05-20 PROCEDURE — 85025 COMPLETE CBC W/AUTO DIFF WBC: CPT

## 2025-05-20 PROCEDURE — 770020 HCHG ROOM/CARE - TELE (206)

## 2025-05-20 RX ADMIN — NITROGLYCERIN 0.5 INCH: 20 OINTMENT TOPICAL at 17:41

## 2025-05-20 RX ADMIN — ASPIRIN 81 MG: 81 TABLET, COATED ORAL at 05:04

## 2025-05-20 RX ADMIN — METOPROLOL TARTRATE 25 MG: 25 TABLET, FILM COATED ORAL at 05:04

## 2025-05-20 RX ADMIN — OXYCODONE 5 MG: 5 TABLET ORAL at 06:21

## 2025-05-20 RX ADMIN — METOPROLOL TARTRATE 25 MG: 25 TABLET, FILM COATED ORAL at 17:41

## 2025-05-20 RX ADMIN — EZETIMIBE 10 MG: 10 TABLET ORAL at 05:04

## 2025-05-20 RX ADMIN — EPTIFIBATIDE 2 MCG/KG/MIN: 0.75 INJECTION, SOLUTION INTRAVENOUS at 04:51

## 2025-05-20 RX ADMIN — CLOPIDOGREL BISULFATE 75 MG: 75 TABLET, FILM COATED ORAL at 05:04

## 2025-05-20 RX ADMIN — SODIUM CHLORIDE 3 ML/KG/HR: 9 INJECTION, SOLUTION INTRAVENOUS at 00:11

## 2025-05-20 RX ADMIN — HEPARIN SODIUM 14 UNITS/KG/HR: 5000 INJECTION, SOLUTION INTRAVENOUS at 18:45

## 2025-05-20 RX ADMIN — HEPARIN SODIUM 14 UNITS/KG/HR: 5000 INJECTION, SOLUTION INTRAVENOUS at 04:48

## 2025-05-20 RX ADMIN — NITROGLYCERIN 0.5 INCH: 20 OINTMENT TOPICAL at 05:05

## 2025-05-20 RX ADMIN — HEPARIN SODIUM 2000 UNITS: 1000 INJECTION, SOLUTION INTRAVENOUS; SUBCUTANEOUS at 04:48

## 2025-05-20 RX ADMIN — HUMAN ALBUMIN MICROSPHERES AND PERFLUTREN 3 ML: 10; .22 INJECTION, SOLUTION INTRAVENOUS at 13:00

## 2025-05-20 RX ADMIN — NITROGLYCERIN 0.5 INCH: 20 OINTMENT TOPICAL at 13:46

## 2025-05-20 RX ADMIN — OXYCODONE 5 MG: 5 TABLET ORAL at 00:25

## 2025-05-20 RX ADMIN — ATORVASTATIN CALCIUM 80 MG: 80 TABLET, FILM COATED ORAL at 17:41

## 2025-05-20 ASSESSMENT — COGNITIVE AND FUNCTIONAL STATUS - GENERAL
DAILY ACTIVITIY SCORE: 24
SUGGESTED CMS G CODE MODIFIER DAILY ACTIVITY: CH
SUGGESTED CMS G CODE MODIFIER MOBILITY: CH
MOBILITY SCORE: 24

## 2025-05-20 ASSESSMENT — ENCOUNTER SYMPTOMS
ABDOMINAL PAIN: 0
SHORTNESS OF BREATH: 0
SENSORY CHANGE: 0
CONSTIPATION: 0
HALLUCINATIONS: 0
NECK PAIN: 0
BACK PAIN: 0
WEIGHT LOSS: 0
SPUTUM PRODUCTION: 0
MYALGIAS: 0
CHILLS: 0
VOMITING: 0
EYE PAIN: 0
SPEECH CHANGE: 0
TREMORS: 0
FOCAL WEAKNESS: 0
ORTHOPNEA: 0
HEADACHES: 0
FEVER: 0
BLURRED VISION: 0
COUGH: 0
DOUBLE VISION: 0
TINGLING: 0
NAUSEA: 0
PHOTOPHOBIA: 0
DIZZINESS: 0
PALPITATIONS: 0
DIARRHEA: 0

## 2025-05-20 ASSESSMENT — PAIN DESCRIPTION - PAIN TYPE
TYPE: ACUTE PAIN

## 2025-05-20 ASSESSMENT — LIFESTYLE VARIABLES: SUBSTANCE_ABUSE: 0

## 2025-05-20 NOTE — ED PROVIDER NOTES
ER Provider Note    Scribed for Boyd Orourke M.D. by Ophelia Maxwell. 5/19/2025   5:50 PM    Primary Care Provider: None noted    CHIEF COMPLAINT  Transfer from [facility] - STEMI    EXTERNAL RECORDS REVIEWED  Spoke with outside facilities physician to discuss case and results    HPI/ROS  LIMITATION TO HISTORY   None  OUTSIDE HISTORIAN(S):  None    Zeeshan Hewitt is a 34 y.o. male who presents to the ED via [facility] as a Transfer from [facility] for evaluation of a STEMI. Patient initially presented to outside facility for bilateral jaw pain and chest pain which began at 12 PM. He states he had some mild chest discomfort which began last week, but 30 minutes after physical therapy noted it to be more present prompting him to go to ED for further evaluation. He took four 325 mg Aspirin tablets prior to ED. He received 3344 units of Heparin and 300 mg of Plavix prior to arrival. He since then reports waxing and waning chest discomfort and jaw pain. Denies any current chest pain. Patient takes 75 mg of Plavix and aspirin daily. He has a known protein C deficiency. History of NSTEMI in 2021 and mid LAD in 2019 with a stent placed. Patient reports getting stress tests and blood work done yearly, following with a Cardiologist in [place].     PAST MEDICAL HISTORY  Past Medical History[1]    SURGICAL HISTORY  Past Surgical History[2]    FAMILY HISTORY  Family History   Problem Relation Age of Onset    Diabetes Mother     Diabetes Father     Diabetes Brother     Heart Attack Brother 42    Heart Disease Brother         pacemaker     SOCIAL HISTORY   reports that he has never smoked. He has never used smokeless tobacco. He reports current alcohol use. He reports current drug use. Drug: Marijuana.    CURRENT MEDICATIONS  Previous Medications    ACETAMINOPHEN (TYLENOL) 500 MG TAB    Take 1,000 mg by mouth one time as needed.    ASPIRIN EC 81 MG EC TABLET    Take 1 Tab by mouth every day.    ATORVASTATIN  "(LIPITOR) 80 MG TABLET    Take 1 tablet by mouth every evening.    CLOPIDOGREL (PLAVIX) 75 MG TAB    Take 1 tablet by mouth every day.    EZETIMIBE (ZETIA) 10 MG TAB    Take 1 tablet by mouth every day.     ALLERGIES  Allergies[3]     PHYSICAL EXAM  Ht 1.753 m (5' 9\")   Wt 83.9 kg (185 lb)   BMI 27.32 kg/m²    Constitutional: Well developed, Well nourished, mild distress,    HENT: Normocephalic, Atraumatic   Eyes: PERRLA, EOMI, Conjunctiva normal, No discharge.   Neck: No tenderness, Supple, No stridor.   Cardiovascular: Normal heart rate, Normal rhythm.   Thorax & Lungs: Clear to auscultation bilaterally, No respiratory distress.   Abdomen: Soft, No tenderness, No masses.   Skin: Warm, Dry, No rash.    Musculoskeletal: No major deformities noted.  Neurologic: Awake, alert. Moves all extremities spontaneously.  Psychiatric: Affect normal, Judgment normal, Mood normal.     DIAGNOSTIC STUDIES    Labs:   Results for orders placed or performed during the hospital encounter of 05/19/25   aPTT    Collection Time: 05/19/25  5:49 PM   Result Value Ref Range    APTT 32.6 24.7 - 36.0 sec   Prothrombin Time    Collection Time: 05/19/25  5:49 PM   Result Value Ref Range    PT 13.0 12.0 - 14.6 sec    INR 0.98 0.87 - 1.13   Heparin Xa (Unfractionated)    Collection Time: 05/19/25  5:49 PM   Result Value Ref Range    Heparin Xa (UFH) <0.10 IU/mL   HOLD BLOOD BANK SPECIMEN (NOT TESTED)    Collection Time: 05/19/25  5:49 PM   Result Value Ref Range    Holding Tube - Bb DONE    POCT activated clotting time device results    Collection Time: 05/19/25  6:30 PM   Result Value Ref Range    Istat Activated Clotting Time 216 (H) 74 - 137 sec   POCT activated clotting time device results    Collection Time: 05/19/25  6:46 PM   Result Value Ref Range    Istat Activated Clotting Time 251 (H) 74 - 137 sec   POCT activated clotting time device results    Collection Time: 05/19/25  7:03 PM   Result Value Ref Range    Istat Activated Clotting " Time 285 (H) 74 - 137 sec     Radiology:     Radiologist interpretation:   CL-LEFT HEART CATHETERIZATION WITH POSSIBLE INTERVENTION    (Results Pending)   EC-ECHOCARDIOGRAM COMPLETE W/ CONT    (Results Pending)   US-EXTREMITY VENOUS LOWER BILAT    (Results Pending)      COURSE & MEDICAL DECISION MAKING     INITIAL ASSESSMENT, COURSE AND PLAN  Differential diagnoses include but not limited to: STEMI     Care Narrative: Patient transferred to outside hospital, had obvious ST elevation at outside hospital, it has normalized but given the patient's history and age it was elected to take the patient to the Cath Lab.  Cardiology was present at the bedside upon the patient's arrival    5:43 PM - Patient was seen and evaluated at trauma bay. Patient presents to the ED for evaluation of a STEMI. Dr. Petty (Interventional Cardiology) at bedside. Patient to go to IR for catheterization.     DISPOSITION AND DISCUSSIONS    I have discussed management of the patient with the following physicians and MELISSA's:  Dr. Petty (Cardiology)    Discussion of management with other QHP or appropriate source(s): None     DISPOSITION:  Patient will be hospitalized by Dr. Villalta in guarded condition.    FINAL DIAGNOSIS  1. ST elevation myocardial infarction (STEMI) involving other coronary artery of inferior wall (HCC)    2. NSTEMI (non-ST elevated myocardial infarction) (HCC)       Ophelia FITCH (Scribe), am scribing for, and in the presence of, Boyd Orourke M.D..    Electronically signed by: Ophelia Maxwell (Scribe), 5/19/2025    IBoyd M.D. personally performed the services described in this documentation, as scribed by Ophelia Maxwell in my presence, and it is both accurate and complete.      The note accurately reflects work and decisions made by me.  Boyd Orourke M.D.  5/19/2025  8:08 PM         [1]   Past Medical History:  Diagnosis Date    PFO with atrial septal aneurysm    [2]   Past Surgical History:  Procedure  Laterality Date    ORIF, FRACTURE, RADIUS AND ULNA  3/17/2014    Performed by Joby Gunderson M.D. at SURGERY [facility] ORS   [3] No Known Allergies

## 2025-05-20 NOTE — PROGRESS NOTES
4 Eyes Skin Assessment Completed by KOLE Guevara and KOLE Pressley.    Head WDL  Ears WDL  Nose WDL  Mouth WDL  Neck WDL  Breast/Chest WDL  Shoulder Blades Redness and Blanching  Spine WDL  (R) Arm/Elbow/Hand cath site to r radial  (L) Arm/Elbow/Hand WDL  Abdomen Scar  Groin WDL  Scrotum/Coccyx/Buttocks WDL  (R) Leg WDL  (L) Leg WDL  (R) Heel/Foot/Toe WDL  (L) Heel/Foot/Toe WDL          Devices In Places ECG, Blood Pressure Cuff, Pulse Ox, and SCD's TR band to R radial       Interventions In Place TAP System, Low Air Loss Mattress, and Pressure Redistribution Mattress    Possible Skin Injury No    Pictures Uploaded Into Epic N/A  Wound Consult Placed N/A  RN Wound Prevention Protocol Ordered No

## 2025-05-20 NOTE — CARE PLAN
The patient is Stable - Low risk of patient condition declining or worsening    Shift Goals  Clinical Goals: hemodynamic stability, monitor for hest pain, manage jaw pain  Patient Goals: get rest and pain control  Family Goals: YANELIS    Progress made toward(s) clinical / shift goals:    Problem: Knowledge Deficit - Standard  Goal: Patient and family/care givers will demonstrate understanding of plan of care, disease process/condition, diagnostic tests and medications  Outcome: Progressing     Problem: Pain - Standard  Goal: Alleviation of pain or a reduction in pain to the patient’s comfort goal  Outcome: Progressing       Patient is not progressing towards the following goals:

## 2025-05-20 NOTE — ASSESSMENT & PLAN NOTE
EF 30% on coronary angiography.  No apparent heart failure on exam.  Ordered transthoracic echo and currently its pending.  Monitor daily weight, input and output  Goal-directed medical therapy as tolerated per cardiology.

## 2025-05-20 NOTE — PROGRESS NOTES
4 Eyes Skin Assessment Completed by KOLE Barragan     Head WDL  Ears WDL  Nose WDL  Mouth WDL  Neck WDL  Breast/Chest WDL  Shoulder Blades WDL  Spine WDL  (R) Arm/Elbow/Hand WDL, right cath radial site  (L) Arm/Elbow/Hand WDL  Abdomen WDL  Groin WDL  Scrotum/Coccyx/Buttocks WDL  (R) Leg WDL  (L) Leg WDL  (R) Heel/Foot/Toe WDL  (L) Heel/Foot/Toe WDL          Devices In Places Tele Box, Blood Pressure Cuff, and Pulse Ox      Interventions In Place Pillows    Possible Skin Injury No    Pictures Uploaded Into Epic N/A  Wound Consult Placed N/A  RN Wound Prevention Protocol Ordered No

## 2025-05-20 NOTE — PROGRESS NOTES
Unable to complete med rec at this time. Staff at bedside.I left text with RN to update med rec when they can.Imaging at bedside.

## 2025-05-20 NOTE — DISCHARGE INSTR - DIET
Heart-Healthy Nutrition Therapy    A heart-healthy diet is recommended to reduce your unhealthy blood cholesterol levels, manage high blood pressure, and lower your risk for heart disease.    To follow a heart-healthy diet,    Eat a balanced diet with whole grains, fruits and vegetables, and lean protein sources.  Achieve and maintain a healthy weight.  Choose heart-healthy unsaturated fats. Limit saturated fats, trans fats, and cholesterol intake. Eat more plant-based or vegetarian meals using beans and soy foods for protein.  Eat whole, unprocessed foods to limit the amount of sodium (salt) you eat.  Limit refined carbohydrates especially sugar, sweets and sugar-sweetened beverages.  If you drink alcohol, do so in moderation: one serving per day (women) and two servings per day (men).  One serving is equivalent to 12 ounces beer, 5 ounces wine, or 1.5 ounces distilled spirits    Tips for Choosing Heart-Healthy Fats    Choose lean protein and low-fat dairy foods to reduce saturated fat intake.    Saturated fat is usually found in animal-based protein and is associated with certain health risks. Saturated fat is the biggest contributor to raised low-density lipoprotein (LDL) cholesterol levels in the diet. Research shows that limiting saturated fat lowers unhealthy cholesterol levels. Eat no more than 5-6% of your total calories each day from saturated fat. Ask your registered dietitian nutritionist (RDN) to help you determine how much saturated fat is right for you.  There are many foods that do not contain large amounts of saturated fats. Swapping these foods to replace foods high in saturated fats will help you limit the saturated fat you eat and improve your cholesterol levels. You can also try eating more plant-based or vegetarian meals.        Avoid trans fats    Trans fats increase levels of LDL-cholesterol. Hydrogenated fat in processed foods is the main source of trans fats in foods.   Trans fats can be  found in stick margarine, shortening, processed sweets, baked goods, some fried foods, and packaged foods made with hydrogenated oils. Avoid foods with “partially hydrogenated oil” on the ingredient list such as: cookies, pastries, baked goods, biscuits, crackers, microwave popcorn, and frozen dinners.    Choose foods with heart healthy fats    Polyunsaturated and monounsaturated fat are unsaturated fats that may help lower your blood cholesterol level when used in place of saturated fat in your diet.  Ask your RDN about taking a dietary supplement with plant sterols and stanols to help lower your cholesterol level.  Research shows that substituting saturated fats with unsaturated fats is beneficial to cholesterol levels. Try these easy swaps:      Limit the amount of cholesterol you eat to less than 200 milligrams per day.    Cholesterol is a substance carried through the bloodstream via lipoproteins, which are known as “transporters” of fat. Some body functions need cholesterol to work properly, but too much cholesterol in the bloodstream can damage arteries and build up blood vessel linings (which can lead to heart attack and stroke). You should eat less than 200 milligrams cholesterol per day.  People respond differently to eating cholesterol. There is no test available right now that can figure out which people will respond more to dietary cholesterol and which will respond less. For individuals with high intake of dietary cholesterol, different types of increase (none, small, moderate, large) in LDL-cholesterol levels are all possible.    Food sources of cholesterol include egg yolks and organ meats such as liver, gizzards.  Limit egg yolks to two to four per week and avoid organ meats like liver and gizzards to control cholesterol intake.    Tips for Choosing Heart-Healthy Carbohydrates    Consume foods rich in viscous (soluble) fiber    Viscous, or soluble, fiber is found in the walls of plant cells. Viscous  fiber is found only in plant-based foods--animal-based foods like meat or dairy products do not contain fiber. In the stomach, viscous fibers absorb water and swell to form a thick, jelly-like mass. This helps to lower your unhealthy cholesterol  Rich sources of viscous fiber include asparagus, Currie sprouts, sweet potatoes, turnips, apricots, mangoes, oranges, legumes, barley, oats, and oat bran.  Eat at least 5 to 10 grams of viscous fiber each day. As you increase your fiber intake gradually, also increase the amount of water you drink. This will help prevent constipation.  If you have difficulty achieving this goal, ask your RDN about fiber laxatives. Choose fiber supplements made with viscous fibers such as psyllium seed husks or methylcellulose to help lower unhealthy cholesterol.    Limit refined carbohydrates    There are three types of carbohydrates: starches, sugar, and fiber. Some carbohydrates occur naturally in food, like the starches in rice or corn or the sugars in fruits and milk. Refined carbohydrates--foods with high amounts of simple sugars--can raise triglyceride levels. High triglyceride levels are associated with coronary heart disease.  Some examples of refined carbohydrate foods are table sugar, sweets, and beverages sweetened with added sugar.    Tips for Reducing Sodium (Salt)  Although sodium is important for your body to function, too much sodium can be harmful for people with high blood pressure.  As sodium and fluid buildup in your tissues and bloodstream, your blood pressure increases. High blood pressure may cause damage to other organs and increase your risk for a stroke.    Keep your salt intake to 2300 milligrams or less per day. Even if you take a pill for blood pressure or a water pill (diuretic) to remove fluid, it is still important to have less salt in your diet. Ask your RDN what amount of sodium is right for you.    Avoid processed foods.  Eat more fresh foods.  Fresh  "fruits and vegetables are naturally low in sodium, as well as frozen vegetables and fruits that have no added juices or sauces.  Fresh meats are lower in sodium than processed meats, such as pires, sausage, and hotdogs.  Read the nutrition label or ask your  to help you find a fresh meat that is low in sodium.  Eat less salt--at the table and when cooking.  A single teaspoon of table salt has 2,300 mg of sodium.  Leave the salt out of recipes for pasta, casseroles, and soups.  Ask your RDN how to cook your favorite recipes without sodium  Be a smart .  Look for food packages that say “salt-free” or “sodium-free.” These items contain less than 5 milligrams of sodium per serving.  “Very low-sodium” products contain less than 35 milligrams of sodium per serving.  “Low-sodium” products contain less than 140 milligrams of sodium per serving.   Beware of “reduced salt” or \"reduced sodium\" products.  These items may still be high in sodium. Check the nutrition label.   Add flavors to your food without adding sodium.  Try lemon juice, lime juice, fruit juice or vinegar.    Dry or fresh herbs add flavor. Try basil, bay leaf, dill, rosemary, parsley, antwan, dry mustard, nutmeg, thyme, and paprika.  Pepper, red pepper flakes, and cayenne pepper can add spice to your meals without adding sodium. Hot sauce contains sodium, but if you use just a drop or two, it will not add up to much.  Buy a sodium-free seasoning blend or make your own at home.     Additional Lifestyle Tips    Achieve and maintain a healthy weight.  Talk with your RDN or your doctor about what is a healthy weight for you.  Set goals to reach and maintain that weight.   To lose weight, reduce your calorie intake along with increasing your physical activity. A weight loss of 10 to 15 pounds could reduce LDL-cholesterol by 5 milligrams per deciliter.  Participate in physical activity.    Talk with your health care team to find out what types of " physical activity are best for you. Set a plan to get about 30 minutes of exercise on most days.          Nutrition Counseling  Our expert team offers:   Medical Nutrition Therapy for Chronic Conditions   Weight Management   Diabetes Education and Management   Wellness Services   Body Composition Measurements   Gastrointestinal Health    Nutrition Counseling Services are located at:  [address], [place] [address]  For more information and to schedule a consultation, please call [phone].  A physician referral may be required by your insurance for coverage.

## 2025-05-20 NOTE — DISCHARGE PLANNING
STEMI Response     Referral: Code STEMI Response     Intervention: MSW responded to code stemi.  Pt was BIB [facility] from [facility].  Pt was alert upon arrival.  Pts name is Zeeshan Hewitt (: 1990).  SW obtained the following pt information: pt was experiencing chest and jaw pain today and is going to cath lab for stemi .      Pt wanted to list brother as emergency contact but requested we do not call him at this time.       Michael Hewitt - [phone]     Plan: SW will remain available for support if needed

## 2025-05-20 NOTE — PROGRESS NOTES
[facility] Acceptance Note    Called by Dr. Ronny Petty for admission to IMCU for STEMI with RCA thrombus requiring integrilin.  Will admit to IMCU under the care of the hospitalist.      Kristel Villalta MD  Pulmonary and Critical Care Medicine

## 2025-05-20 NOTE — DIETARY
Nutrition Services: Diet Education Consult   Day 1 of admit.  Zeeshan Hewitt is a 34 y.o. male with admitting DX of STEMI (ST elevation myocardial infarction) (HCC) [I21.3]    RD received referral for cardiac diet education. Dx list includes acute STEMI, systolic dysfunction, PFO w/ atrial septal aneurysm. S/p L heart cath, PCI- angioplasty (RCA). RD provided information via discharge instructions which includes nutrition recommendations and tips to support a heart healthy dietary pattern. This includes outpatient resources to [facility] affiliated Nutrition Program for continued nutrition education and guidance as desired.     No other education needs identified at this time. Please re-consult RD for supplemental education or at the request of patient.    Please re-consult RD PRN

## 2025-05-20 NOTE — PROGRESS NOTES
Cardiology Follow Up Progress Note    Date of Service  5/20/2025    Attending Physician  JOJO López*    Chief Complaint   Acute inferior ST elevation myocardial infarction    YOUNG Hewitt is a 34 y.o. male admitted 5/19/2025 with known history of coronary artery disease with prior stent placement into the mid LAD in 2019, protein acid deficiency presents with acute inferior ST elevation myocardial infarction    Interim Events  No acute overnight events however patient does have some right wrist and forearm discomfort surrounding his IV.  No acute events per nursing.    Review of Systems  Review of Systems    Vital signs in last 24 hours  Temp:  [35.9 °C (96.6 °F)-36.9 °C (98.5 °F)] 36.1 °C (96.9 °F)  Pulse:  [59-79] 70  Resp:  [19-20] 19  BP: ()/(54-92) 103/64  SpO2:  [88 %-97 %] 92 %    Physical Exam  Physical Exam  Constitutional:       Appearance: Normal appearance. He is obese.   HENT:      Head: Normocephalic and atraumatic.      Mouth/Throat:      Mouth: Mucous membranes are moist.      Pharynx: Oropharynx is clear.   Eyes:      Extraocular Movements: Extraocular movements intact.      Conjunctiva/sclera: Conjunctivae normal.   Cardiovascular:      Rate and Rhythm: Normal rate and regular rhythm.      Pulses: Normal pulses.      Heart sounds: Normal heart sounds. No murmur heard.     No friction rub. No gallop.   Pulmonary:      Effort: Pulmonary effort is normal.      Breath sounds: Normal breath sounds. No wheezing, rhonchi or rales.   Abdominal:      General: Bowel sounds are normal. There is no distension.      Palpations: Abdomen is soft.   Musculoskeletal:         General: Normal range of motion.      Cervical back: Normal range of motion and neck supple.      Right lower leg: No edema.      Left lower leg: No edema.      Comments: Right arm tender to palpation with noted ecchymosis around the radial access site   Skin:     General: Skin is warm and dry.   Neurological:  "     General: No focal deficit present.      Mental Status: He is alert and oriented to person, place, and time. Mental status is at baseline.   Psychiatric:         Mood and Affect: Mood normal.         Behavior: Behavior normal.         Thought Content: Thought content normal.         Judgment: Judgment normal.         Lab Review  Lab Results   Component Value Date/Time    WBC 9.1 05/20/2025 03:25 AM    RBC 4.45 (L) 05/20/2025 03:25 AM    HEMOGLOBIN 13.6 (L) 05/20/2025 03:25 AM    HEMATOCRIT 38.9 (L) 05/20/2025 03:25 AM    MCV 87.4 05/20/2025 03:25 AM    MCH 30.6 05/20/2025 03:25 AM    MCHC 35.0 05/20/2025 03:25 AM    MPV 9.7 05/20/2025 03:25 AM      Lab Results   Component Value Date/Time    SODIUM 137 05/20/2025 03:25 AM    POTASSIUM 3.9 05/20/2025 03:25 AM    CHLORIDE 105 05/20/2025 03:25 AM    CO2 22 05/20/2025 03:25 AM    GLUCOSE 124 (H) 05/20/2025 03:25 AM    BUN 15 05/20/2025 03:25 AM    CREATININE 0.94 05/20/2025 03:25 AM      Lab Results   Component Value Date/Time    ASTSGOT 161 (H) 05/20/2025 03:25 AM    ALTSGPT 39 05/20/2025 03:25 AM     Lab Results   Component Value Date/Time    CHOLSTRLTOT 171 04/30/2021 04:22 AM     (H) 04/30/2021 04:22 AM    HDL 30 (A) 04/30/2021 04:22 AM    TRIGLYCERIDE 206 (H) 04/30/2021 04:22 AM    TROPONINT 2093 (H) 04/30/2021 04:22 AM       No results for input(s): \"NTPROBNP\" in the last 72 hours.    Cardiac Imaging and Procedures Review  Cardiac catheterization performed on 5/19/2025 demonstrated thrombotic occlusion of the right coronary artery requiring aspiration thrombectomy with subsequent distal occlusion of the PDA and mid posterolateral system due to shower emboli could not be retrieved.  Elevated left ventricular end-diastolic pressure estimated at 33 mmHg        Assessment/Plan  1.  Acute inferior ST elevation myocardial infarction due to thrombosis  2.  Coronary artery disease with prior stent placement into the LAD  3.  Protein S deficiency  4.  " Dyslipidemia    Clinically, he is doing well and continues to remain on heparin continuous infusion given noted thrombotic etiology to his acute inferior ST elevation myocardial infarction.  This will be continued for the next 24 to 48 hours.  Patient will remain on aspirin and Plavix therapy with eventual transition over to Xarelto 20 mg daily as well as Plavix 75 mg daily.  He will continue ongoing high intensity statin therapy.  Integrilin will currently finished at approximately 11 AM on 5/20/2025.    In terms of other medical conditions I will defer to the primary medicine service      Thank you for allowing me to participate in the care of this patient.  I will continue to follow this patient    Please contact me with any questions.    Jay Christopher D.O.   Cardiologist, [facility]  [phone]

## 2025-05-20 NOTE — PROGRESS NOTES
"Hospital Medicine Daily Progress Note    Date of Service  5/20/2025    Chief Complaint  Zeeshan Hewitt is a 34 y.o. male admitted 5/19/2025 with chest pain    Hospital Course  As per Dr. Shukal note  \"Zeeshan Hewitt is a 34 y.o. male with history of CAD, PCI to LAD for NSTEMI in 2019, NSTEMI in 2021, PFO, protein C deficiency, who presented 5/19/2025 with STEMI from [facility].  He presented at outside hospital complaining of bilateral jaw pain and chest pain started at 12 PM.  He took aspirin 325, received IV heparin and Plavix 300 mg.  He has been taking Plavix and aspirin daily..  EKG showed inferior STEMI.  Patient was taken to Cath Lab emergently, that revealed thrombus in the proximal RCA, suspected paradoxical embolism, successful angioplasty, with residual occlusion of PDA due to embolic material that could not be retrieved, severe LV dysfunction 30%, severe LV EDP.\"  Echocardiogram ordered to evaluate for PFO and ultrasound DVT for bilateral lower extremities.    Interval Problem Update  05/20/25    I evaluated and examined him at the bedside.  Ultrasound DVT for bilateral lower extremities and echocardiogram ordered for PFO and currently its pending.  Currently he is hemodynamically stable on room air and maintaining oxygen saturation of 92%.  CBC showed white blood cell count of 9.1, hemoglobin of 13.6 and platelet count of 220  CMP showed sodium of 137, potassium of 3.9 BUN of 15 and creatinine of 0.94.  Ordered lipid profile and HbA1c for tomorrow.  Later this afternoon patient had swelling in his right upper extremity ordered ultrasound DVT to evaluate for any deep venous thrombosis.  I am continuing heparin gtt. and I am titrating as per Anti Xa levels and monitor for signs of bleeding.      I have discussed this patient's plan of care and discharge plan at IDT rounds today with Case Management, Nursing, Nursing leadership, and other members of the IDT " team.    Consultants/Specialty  cardiology    Code Status  Full Code    Disposition  The patient is not medically cleared for discharge to home or a post-acute facility.      I have placed the appropriate orders for post-discharge needs.    Review of Systems  Review of Systems   Constitutional:  Negative for chills, fever and weight loss.   HENT:  Negative for hearing loss and tinnitus.    Eyes:  Negative for blurred vision, double vision, photophobia and pain.   Respiratory:  Negative for cough, sputum production and shortness of breath.    Cardiovascular:  Negative for chest pain, palpitations, orthopnea and leg swelling.   Gastrointestinal:  Negative for abdominal pain, constipation, diarrhea, nausea and vomiting.   Genitourinary:  Negative for dysuria, frequency and urgency.   Musculoskeletal:  Negative for back pain, joint pain, myalgias and neck pain.   Skin:  Negative for rash.   Neurological:  Negative for dizziness, tingling, tremors, sensory change, speech change, focal weakness and headaches.   Psychiatric/Behavioral:  Negative for hallucinations and substance abuse.    All other systems reviewed and are negative.       Physical Exam  Temp:  [35.9 °C (96.6 °F)-36.9 °C (98.5 °F)] 36.1 °C (96.9 °F)  Pulse:  [59-79] 70  Resp:  [19-20] 19  BP: ()/(54-92) 103/64  SpO2:  [88 %-97 %] 92 %    Physical Exam  Vitals reviewed.   Constitutional:       General: He is not in acute distress.  HENT:      Head: Normocephalic and atraumatic. No contusion.      Right Ear: External ear normal.      Left Ear: External ear normal.      Nose: Nose normal.      Mouth/Throat:      Mouth: Mucous membranes are dry.      Pharynx: No oropharyngeal exudate.   Eyes:      General:         Right eye: No discharge.         Left eye: No discharge.      Pupils: Pupils are equal, round, and reactive to light.   Cardiovascular:      Rate and Rhythm: Normal rate and regular rhythm.      Heart sounds: No murmur heard.     No friction rub.  No gallop.   Pulmonary:      Effort: Pulmonary effort is normal.      Breath sounds: No wheezing or rhonchi.   Abdominal:      General: Bowel sounds are normal. There is no distension.      Palpations: Abdomen is soft.      Tenderness: There is no abdominal tenderness. There is no rebound.   Musculoskeletal:         General: No swelling or tenderness. Normal range of motion.      Cervical back: No rigidity. No muscular tenderness.   Skin:     General: Skin is warm and dry.      Coloration: Skin is not jaundiced.   Neurological:      General: No focal deficit present.      Mental Status: He is alert and oriented to person, place, and time.      Cranial Nerves: No cranial nerve deficit.      Sensory: No sensory deficit.      Comments: Following commands and moving his extremities   Psychiatric:         Mood and Affect: Mood normal.         Fluids    Intake/Output Summary (Last 24 hours) at 5/20/2025 0904  Last data filed at 5/20/2025 0800  Gross per 24 hour   Intake 234.72 ml   Output --   Net 234.72 ml        Laboratory  Recent Labs     05/20/25  0325   WBC 9.1   RBC 4.45*   HEMOGLOBIN 13.6*   HEMATOCRIT 38.9*   MCV 87.4   MCH 30.6   MCHC 35.0   RDW 40.6   PLATELETCT 220   MPV 9.7     Recent Labs     05/20/25  0325   SODIUM 137   POTASSIUM 3.9   CHLORIDE 105   CO2 22   GLUCOSE 124*   BUN 15   CREATININE 0.94   CALCIUM 8.0*     Recent Labs     05/19/25  1749 05/19/25  2150   APTT 32.6 157.1*   INR 0.98 1.06               Imaging  CL-LEFT HEART CATHETERIZATION WITH POSSIBLE INTERVENTION    (Results Pending)   EC-ECHOCARDIOGRAM COMPLETE W/ CONT    (Results Pending)   US-EXTREMITY VENOUS LOWER BILAT    (Results Pending)        Assessment/Plan  * Acute ST elevation myocardial infarction (STEMI) of inferior wall (HCC)  Assessment & Plan  34-year-old male with prior history of PCI to LAD for NSTEMI in 2019, NSTEMI in 2021, presented with chest pain and ST elevation in II, III, aVF on EKG  Status post successful angioplasty  for thrombus in the proximal RCA, suspected paradoxical embolism,  with residual occlusion of PDA due to embolic material that could not be retrieved, severe LV dysfunction 30%, severe LV EDP.  Plan: Monitor on telemetry for reperfusion arrhythmia  Cardiology is following him.  Medical optimization with metoprolol.  Continue Lipitor, Zetia  Ordered HbA1c and lipid profile for tomorrow morning.  Continue telemetry.    Systolic dysfunction  Assessment & Plan  EF 30% on coronary angiography.  No apparent heart failure on exam.  Ordered transthoracic echo and currently its pending.  Monitor daily weight, input and output  Goal-directed medical therapy as tolerated per cardiology.    PFO with atrial septal aneurysm- (present on admission)  Assessment & Plan  Echocardiogram ordered and ultrasound DVT bilateral lower extremities and currently its pending.       I discussed plan of care during multidisciplinary rounds regarding patient's current medical condition and plan of care.    I reviewed cardiology note      Patient is critically ill.   The patient continues to have: ACS/STEMI  The vital organ system that is affected is the: Cardiovascular system  If untreated there is a high chance of deterioration into: Cardiovascular collapse and eventually death.   The critical care that I am providing today is: I am continuing heparin gtt. and I am titrating as per Anti Xa levels and monitor for signs of bleeding.  At the time of evaluation he is on heparin  The critical that has been undertaken is medically complex.   There has been no overlap in critical care time.   Critical Care Time not including procedures: 36 minutes        VTE prophylaxis: I am continuing heparin gtt. and I am titrating as per Anti Xa levels and monitor for signs of bleeding.      I have performed a physical exam and reviewed and updated ROS and Plan today (5/20/2025). In review of yesterday's note (5/19/2025), there are no changes except as documented  above.

## 2025-05-20 NOTE — CARE PLAN
The patient is Watcher - Medium risk of patient condition declining or worsening    Shift Goals  Clinical Goals: hemodynamic stability, monitor for hest pain, manage jaw pain  Patient Goals: get rest and pain control  Family Goals: YANELIS    Progress made toward(s) clinical / shift goals:        Problem: Knowledge Deficit - Standard  Goal: Patient and family/care givers will demonstrate understanding of plan of care, disease process/condition, diagnostic tests and medications  Description: Target End Date:  1-3 days or as soon as patient condition allowsDocument in Patient Education1.  Patient and family/caregiver oriented to unit, equipment, visitation policy and means for communicating concern2.  Complete/review Learning Assessment3.  Assess knowledge level of disease process/condition, treatment plan, diagnostic tests and medications4.  Explain disease process/condition, treatment plan, diagnostic tests and medications  Outcome: Progressing     Problem: Pain - Standard  Goal: Alleviation of pain or a reduction in pain to the patient’s comfort goal  Description: Target End Date:  Prior to discharge or change in level of careDocument on Vitals flowsheet1.  Document pain using the appropriate pain scale per order or unit policy2.  Educate and implement non-pharmacologic comfort measures (i.e. relaxation, distraction, massage, cold/heat therapy, etc.)3.  Pain management medications as ordered4.  Reassess pain after pain med administration per policy5.  If opiods administered assess patient's response to pain medication is appropriate per POSS sedation scale6.  Follow pain management plan developed in collaboration with patient and interdisciplinary team (including palliative care or pain specialists if applicable)  Outcome: Progressing       Patient is not progressing towards the following goals:

## 2025-05-20 NOTE — PROCEDURES
"CARDIAC CATHETERIZATION REPORT    Referring Provider: myself    PROCEDURE PHYSICIAN: Rogerio Petty MD, [facility], [facility]  ASSISTANT: None    IMPRESSIONS:  1.  Inferior STEMI due to thrombus affecting the proximal right coronary artery-suspect paradoxical embolism  2.  Successful angioplasty and aspiration thrombectomy of the proximal RCA thrombus as well as distal RCA treating embolization  3.  Residual occlusion of the PDA and mid posterolateral system due to embolic material which could not be retrieved  4.  Severe LV systolic dysfunction  5.  Severe elevation LVEDP at 33 mmHg    Recommendations:  Assess for PFO and lower extremity DVT  Integrilin and heparin infusions  Plavix and anticoagulant anticipated post discharge-preliminarily I suspect long-term anticoagulation would be most appropriate    Pre-procedure diagnosis: STEMI  Post-procedure diagnosis: Same    Procedure performed  Selective coronary angiography  Left heart catheterization  PCI- Angioplasty (RCA)  IVUS RCA    Conscious sedation was supervised by myself and administered by trained personnel using fentanyl and versed between 1814 and 1923. The patient tolerated sedation without complication.     Procedure Description  1. Access: 5/6 [place] right ulnar artery Micropuncture technique was utilized following local anesthesia with lidocaine.  A radial cocktail containing verapamil and saline was administered in the radial artery sheath Dynamic ultrasound was utilized to gain access.        2. Diagnostic description: The catheter was passed to the central circulation with the aide of J tipped 0.35\" wire. A 5F TIG 4.0 and 6F Pigtail were used to inject the coronary circulation and enter the left ventricle during invasive hemodynamic monitoring. Left ventricular end diastolic pressure was measured using the catheter to guide perioperative fluid management.  Left ventriculography was performed with a pigtail catheter.     3. Description of Intervention: After " "confirming therapeutic anticoagulation intervention proceeded. A 6F Ikari 1.5 Right guiding catheter was used. The lesion in the RCA was crossed with a 0.014\" Prowater wire.  I performed IVUS which demonstrated healthy appearing intima with obstructive material involving approximately 50% of the lumen compatible with the angiographic diagnosis of thrombus.  I used a 4.0 NC balloon to break up the thrombus.  This improved the situation proximally but resulted embolization in the middle portion of the vessel.  I then used a aspiration thrombectomy catheter and made multiple passes aspirating material from the proximal mid and distal portions of the vessel.  A large-approximately 5 x 2 mm mixed red and white thrombus was aspirated from the proximal vessel and several other smaller bits of mixed red and white as well as white frothy type of thrombus were all aspirated.  There was persistence of thrombus distally as well as proximally.  I did assess with IVUS to ensure it was not simply a angulation of the vessel.  Thrombus was confirmed.  I performed additional angioplasty proximal of the 5.0 balloon and distally with a 3.0 balloon.  There was marked improvement in the AV groove portion of the right coronary artery but there was thrombus occluding 2 of the subbranches of the posterolateral system.  I passed a wire to fenestrate the thrombus with small improvement of blood flow.  The thrombus was felt too far to reach safely with coronary equipment.  The patient was treated with a Integrilin infusion    4. Closing: At completion of the procedure the relevant equipment was removed from the body and hemostasis achieved by Radial band    Findings   Hemodynamics:   Aorta: 116/91 mmHg  LVEDP: 33 mmHg  No significant pullback gradient across the aortic valve    Coronary Anatomy   Left Main: Normal   LAD: Patent stent in the mid segment of the vessel otherwise normal   LCx: Normal   RCA: Dominant, large thrombus in the proximal " portion of the vessel, the PDA is occluded       Left Ventriculography:   LVEF= 30%. Global hypokinesis    Results of intervention to the RCA:  Pre: 70% stenosis and ÁNGEL II flow  Post: 0% residual stenosis and ÁNGEL III flow. No dissection.  Additional material embolized into the posterolateral branch following intervention the more proximal vessel    Technical Factors  1. Complications: None  2. Estimated Blood Loss: <50 cc  3. Specimens: None  4. Contrast Volume: 160 ml  5. Medications: Radial cocktail (Verapamil 2.5 mg, Nitroglycerin 100 mcg) Heparin to maintain ACT >250 Integrillin double bolus Intracoronary nitroglycerin

## 2025-05-20 NOTE — ASSESSMENT & PLAN NOTE
34-year-old male with prior history of PCI to LAD for NSTEMI in 2019, NSTEMI in 2021, presented with chest pain and ST elevation in II, III, aVF on EKG  Status post successful angioplasty for thrombus in the proximal RCA, suspected paradoxical embolism,  with residual occlusion of PDA due to embolic material that could not be retrieved, severe LV dysfunction 30%, severe LV EDP.  Plan: Monitor on telemetry for reperfusion arrhythmia  Cardiology is following him.  Medical optimization with metoprolol.  Continue Lipitor, Zetia  Ordered HbA1c and lipid profile for tomorrow morning.  Continue telemetry.

## 2025-05-20 NOTE — H&P
Hospital Medicine History & Physical Note    Date of Service  5/19/2025    Primary Care Physician  Pcp Pt States None    Consultants  Cardiology    Code Status  Full Code    Chief Complaint  Chest pain    History of Presenting Illness  Zeeshan Hewitt is a 34 y.o. male with history of CAD, PCI to LAD for NSTEMI in 2019, NSTEMI in 2021, PFO, protein C deficiency, who presented 5/19/2025 with STEMI from [facility].  He presented at outside hospital complaining of bilateral jaw pain and chest pain started at 12 PM.  He took aspirin 325, received IV heparin and Plavix 300 mg.  He has been taking Plavix and aspirin daily..  EKG showed inferior STEMI.  Patient was taken to Cath Lab emergently, that revealed thrombus in the proximal RCA, suspected paradoxical embolism, successful angioplasty, with residual occlusion of PDA due to embolic material that could not be retrieved, severe LV dysfunction 30%, severe LV EDP.    I discussed the plan of care with patient and ERP.    Review of Systems  Review of Systems   Constitutional:  Negative for chills, fever and weight loss.   HENT:  Negative for ear pain, hearing loss and tinnitus.    Eyes:  Negative for blurred vision, double vision and photophobia.   Respiratory:  Negative for cough, hemoptysis and sputum production.    Cardiovascular:  Positive for chest pain. Negative for palpitations and orthopnea.   Gastrointestinal:  Negative for heartburn, nausea and vomiting.   Genitourinary:  Negative for dysuria, flank pain, frequency and hematuria.   Musculoskeletal:  Positive for joint pain. Negative for back pain and neck pain.   Skin:  Negative for itching and rash.   Neurological:  Negative for tremors, speech change, focal weakness and headaches.   Endo/Heme/Allergies:  Negative for environmental allergies and polydipsia. Does not bruise/bleed easily.   Psychiatric/Behavioral:  Negative for hallucinations and substance abuse. The patient is not  nervous/anxious.        Past Medical History   has a past medical history of PFO with atrial septal aneurysm.    Surgical History   has a past surgical history that includes orif, fracture, radius and ulna (3/17/2014).     Family History  family history includes Diabetes in his brother, father, and mother; Heart Attack (age of onset: 42) in his brother; Heart Disease in his brother.   Family history reviewed with patient. There is no family history that is pertinent to the chief complaint.     Social History   reports that he has never smoked. He has never used smokeless tobacco. He reports current alcohol use. He reports current drug use. Drug: Marijuana.    Allergies  Allergies[1]    Medications  Prior to Admission Medications   Prescriptions Last Dose Informant Patient Reported? Taking?   acetaminophen (TYLENOL) 500 MG Tab  Patient Yes No   Sig: Take 1,000 mg by mouth one time as needed.   aspirin EC 81 MG EC tablet  Patient No No   Sig: Take 1 Tab by mouth every day.   atorvastatin (LIPITOR) 80 MG tablet   No No   Sig: Take 1 tablet by mouth every evening.   clopidogrel (PLAVIX) 75 MG Tab   No No   Sig: Take 1 tablet by mouth every day.   ezetimibe (ZETIA) 10 MG Tab   No No   Sig: Take 1 tablet by mouth every day.      Facility-Administered Medications: None       Physical Exam                             Physical Exam  Vitals and nursing note reviewed.   Constitutional:       General: He is not in acute distress.     Appearance: Normal appearance.   HENT:      Head: Normocephalic and atraumatic.      Nose: Nose normal.      Mouth/Throat:      Mouth: Mucous membranes are moist.   Eyes:      Extraocular Movements: Extraocular movements intact.      Pupils: Pupils are equal, round, and reactive to light.   Cardiovascular:      Rate and Rhythm: Normal rate and regular rhythm.   Pulmonary:      Effort: Pulmonary effort is normal.      Breath sounds: Normal breath sounds.   Abdominal:      General: Abdomen is flat.  "There is no distension.      Tenderness: There is no abdominal tenderness.   Musculoskeletal:         General: No swelling or deformity. Normal range of motion.      Cervical back: Normal range of motion and neck supple.   Skin:     General: Skin is warm and dry.   Neurological:      General: No focal deficit present.      Mental Status: He is alert and oriented to person, place, and time.   Psychiatric:         Mood and Affect: Mood normal.         Behavior: Behavior normal.         Laboratory:          No results for input(s): \"ALTSGPT\", \"ASTSGOT\", \"ALKPHOSPHAT\", \"TBILIRUBIN\", \"DBILIRUBIN\", \"GAMMAGT\", \"AMYLASE\", \"LIPASE\", \"ALB\", \"PREALBUMIN\", \"GLUCOSE\" in the last 72 hours.  Recent Labs     05/19/25  1749   APTT 32.6   INR 0.98     No results for input(s): \"NTPROBNP\" in the last 72 hours.      No results for input(s): \"TROPONINT\" in the last 72 hours.    Imaging:  CL-LEFT HEART CATHETERIZATION WITH POSSIBLE INTERVENTION    (Results Pending)         Assessment/Plan:  Justification for Admission Status  I anticipate this patient will require at least two midnights for appropriate medical management, necessitating inpatient admission because STEMI    Patient will need a Telemetry bed on CARDIOLOGY service .  The need is secondary to STEMI.    * Acute ST elevation myocardial infarction (STEMI) of inferior wall (HCC)  Assessment & Plan  34-year-old male with prior history of PCI to LAD for NSTEMI in 2019, NSTEMI in 2021, presented with chest pain and ST elevation in II, III, aVF on EKG  Status post successful angioplasty for thrombus in the proximal RCA, suspected paradoxical embolism,  with residual occlusion of PDA due to embolic material that could not be retrieved, severe LV dysfunction 30%, severe LV EDP.  Plan: Monitor on telemetry for reperfusion arrhythmia  Continue IV heparin, antiplatelets.  Patient will need to be discharged on Eliquis and Plavix   Medical optimization with metoprolol.  Continue Lipitor, " Zetia    Systolic dysfunction  Assessment & Plan  EF 30% on coronary angiography  Ordered transthoracic echo  Monitor daily weight, input and output    PFO with atrial septal aneurysm- (present on admission)  Assessment & Plan  Noted        VTE prophylaxis: therapeutic anticoagulation with IV heparin, Integrilin    Patient is critically ill.   The patient continues to have: Acute myocardial infarction  The vital organ system that is affected is the: Cardiovascular, respiratory  If untreated there is a high chance of deterioration into: Cardiogenic shock, fatal arrhythmia  And eventually death.   The critical care that I am providing today is:   I am titrating IV heparin for therapeutic Xa  Reassessing vitals, pain level and for signs of bleeding  Coordination of care  The critical that has been undertaken is medically complex.   There has been no overlap in critical care time.   Critical Care Time not including procedures: 69 min           [1] No Known Allergies

## 2025-05-20 NOTE — PROGRESS NOTES
Bedside report received from off going RN/tech: KOLE Pendleton, assumed care of patient.     Fall Risk Score: NO RISK  Fall risk interventions in place: Provide patient/family education based on risk assessment  Bed type: Regular (Javier Score less than 17 interventions in place)  Patient on cardiac monitor: Yes  IVF/IV medications: Infusion per MAR (List Med(s)) heparin  Oxygen: Room Air  Bedside sitter: Not Applicable   Isolation: Not applicable

## 2025-05-20 NOTE — PROGRESS NOTES
Patient transported to T824 in wheel chair with RN and heparin gtt. Bedside report given to Laurel SHARIF.

## 2025-05-20 NOTE — CONSULTS
"INTERVENTIONAL CARDIOLOGY CONSULTATION    Requesting Provider: Kristel Villalta M.D.  Reason for consultation: STEMI    Impressions:  #.  Supported inferior STEMI  #.  History of LAD PCI for NSTEMI 2019  - Recurrent NSTEMI 2021 with patent cors on cath, but moderate LV systolic dysfunction then resolved by echo  #.  History of PFO    Recommendations:  Plan for urgent coronary angiography, possible PCI    Further recommendations to follow    Will follow    History: Zeeshan Hewitt is a 34 y.o. male with history of LAD PCI in 2019 after presenting with chest pain, new left bundle presenting for assessment of chest discomfort.    He was well until a few weeks ago when he had pain in the jaw reminiscent of prior experiences with coronary artery disease.  He returns today and prompted him to get assessed at the [facility] emergency room.  He was found to have inferior ST elevation which along with symptoms is now resolved.  He was given heparin and antiplatelet therapy.    In 2021 he was hospitalized with non-STEMI.  At that time angiography showed patent coronary arteries and mid inferior hypokinesis along with moderate LV dysfunction (my personal interpretation of the catheterization).  Troponin during that hospitalization was around 2000    ROS:   10 point review systems is otherwise negative except as per the HPI    PE:  Ht 1.753 m (5' 9\")   Wt 83.9 kg (185 lb)   BMI 27.32 kg/m²   /70, HR 77  GEN: NAD  RESP: CTAB  CVS: RRR, No M/R/G  ABD: Soft, NT/ND  EXT: WWP, no edema    Today's encounter addressed an illness with threat to life/bodily function ACS    Past Medical History[1]  Past Surgical History[2]  Allergies[3]  Current Medications[4]  Prescriptions Prior to Admission[5]   Social History     Socioeconomic History    Marital status: Single     Spouse name: Not on file    Number of children: Not on file    Years of education: Not on file    Highest education level: Not on file   Occupational " History    Not on file   Tobacco Use    Smoking status: Never    Smokeless tobacco: Never   Vaping Use    Vaping status: Never Used   Substance and Sexual Activity    Alcohol use: Yes     Comment: occasionally    Drug use: Yes     Types: Marijuana     Comment: weed    Sexual activity: Not on file   Other Topics Concern    Not on file   Social History Narrative    Not on file     Social Drivers of Health     Financial Resource Strain: Not on file   Food Insecurity: Not on file   Transportation Needs: Not on file   Physical Activity: Not on file   Stress: Not on file   Social Connections: Not on file   Intimate Partner Violence: Not on file   Housing Stability: Not on file     Family History   Problem Relation Age of Onset    Diabetes Mother     Diabetes Father     Diabetes Brother     Heart Attack Brother 42    Heart Disease Brother         pacemaker          Studies  Lab Results   Component Value Date/Time    CHOLSTRLTOT 171 04/30/2021 04:22 AM     (H) 04/30/2021 04:22 AM    HDL 30 (A) 04/30/2021 04:22 AM    TRIGLYCERIDE 206 (H) 04/30/2021 04:22 AM       Lab Results   Component Value Date/Time    SODIUM 139 09/04/2021 08:15 PM    POTASSIUM 4.1 09/04/2021 08:15 PM    CHLORIDE 101 09/04/2021 08:15 PM    CO2 24 09/04/2021 08:15 PM    GLUCOSE 93 09/04/2021 08:15 PM    BUN 20 09/04/2021 08:15 PM    CREATININE 0.97 09/04/2021 08:15 PM      Lab Results   Component Value Date/Time    PROTHROMBTM 13.1 04/30/2021 04:22 AM    INR 0.96 04/30/2021 04:22 AM      Lab Results   Component Value Date/Time    WBC 8.0 09/04/2021 08:15 PM    RBC 5.29 09/04/2021 08:15 PM    HEMOGLOBIN 16.2 09/04/2021 08:15 PM    HEMATOCRIT 46.7 09/04/2021 08:15 PM    MCV 88.3 09/04/2021 08:15 PM    MCH 30.6 09/04/2021 08:15 PM    MCHC 34.7 09/04/2021 08:15 PM    MPV 9.5 09/04/2021 08:15 PM    NEUTSPOLYS 76.90 (H) 09/04/2021 08:15 PM    LYMPHOCYTES 16.20 (L) 09/04/2021 08:15 PM    MONOCYTES 5.20 09/04/2021 08:15 PM    EOSINOPHILS 0.90 09/04/2021  08:15 PM    BASOPHILS 0.50 09/04/2021 08:15 PM        Case discussed with Dr. Orourke           [1]   Past Medical History:  Diagnosis Date    PFO with atrial septal aneurysm    [2]   Past Surgical History:  Procedure Laterality Date    ORIF, FRACTURE, RADIUS AND ULNA  3/17/2014    Performed by Joby Gunderson M.D. at [facility]   [3] No Known Allergies  [4]   Current Facility-Administered Medications:     heparin injection 2,500 Units, 30 Units/kg, Intravenous, PRN, Boyd Orourke M.D.    VERAPAMIL HCL 2.5 MG/ML IV SOLN, , , ,     heparin infusion 25,000 units in 500 mL 0.45% NACL, 0-30 Units/kg/hr, Intravenous, Continuous, Boyd Oorurke M.D.    HEPARIN SODIUM (PORCINE) 1000 UNIT/ML INJ SOLN, , , ,     LIDOCAINE HCL 2 % INJ SOLN, , , ,     HEPARIN (PORCINE) IN NACL 2000-0.9 UNIT/L-% IV SOLN, , , ,     NITROGLYCERIN 2 MG IV SOLN, , , ,     Current Outpatient Medications:     ezetimibe (ZETIA) 10 MG Tab, Take 1 tablet by mouth every day., Disp: 30 tablet, Rfl: 11    atorvastatin (LIPITOR) 80 MG tablet, Take 1 tablet by mouth every evening., Disp: 90 tablet, Rfl: 11    clopidogrel (PLAVIX) 75 MG Tab, Take 1 tablet by mouth every day., Disp: 30 tablet, Rfl: 11    acetaminophen (TYLENOL) 500 MG Tab, Take 1,000 mg by mouth one time as needed., Disp: , Rfl:     aspirin EC 81 MG EC tablet, Take 1 Tab by mouth every day., Disp: 30 Tab, Rfl: 5  [5] (Not in a hospital admission)

## 2025-05-21 ENCOUNTER — APPOINTMENT (OUTPATIENT)
Dept: RADIOLOGY | Facility: MEDICAL CENTER | Age: 35
DRG: 251 | End: 2025-05-21
Attending: INTERNAL MEDICINE
Payer: COMMERCIAL

## 2025-05-21 ENCOUNTER — PHARMACY VISIT (OUTPATIENT)
Dept: PHARMACY | Facility: MEDICAL CENTER | Age: 35
End: 2025-05-21
Payer: COMMERCIAL

## 2025-05-21 VITALS
BODY MASS INDEX: 28.15 KG/M2 | OXYGEN SATURATION: 97 % | DIASTOLIC BLOOD PRESSURE: 74 MMHG | WEIGHT: 190.04 LBS | HEART RATE: 78 BPM | TEMPERATURE: 97.3 F | HEIGHT: 69 IN | SYSTOLIC BLOOD PRESSURE: 112 MMHG | RESPIRATION RATE: 16 BRPM

## 2025-05-21 PROBLEM — D68.59 PROTEIN C DEFICIENCY (HCC): Status: ACTIVE | Noted: 2025-05-21

## 2025-05-21 PROBLEM — I21.19 ACUTE ST ELEVATION MYOCARDIAL INFARCTION (STEMI) OF INFERIOR WALL (HCC): Status: RESOLVED | Noted: 2025-05-19 | Resolved: 2025-05-21

## 2025-05-21 LAB
ALBUMIN SERPL BCP-MCNC: 3.7 G/DL (ref 3.2–4.9)
ALBUMIN/GLOB SERPL: 1.5 G/DL
ALP SERPL-CCNC: 79 U/L (ref 30–99)
ALT SERPL-CCNC: 43 U/L (ref 2–50)
ANION GAP SERPL CALC-SCNC: 9 MMOL/L (ref 7–16)
AST SERPL-CCNC: 125 U/L (ref 12–45)
BILIRUB SERPL-MCNC: 0.3 MG/DL (ref 0.1–1.5)
BUN SERPL-MCNC: 11 MG/DL (ref 8–22)
CALCIUM ALBUM COR SERPL-MCNC: 8.6 MG/DL (ref 8.5–10.5)
CALCIUM SERPL-MCNC: 8.4 MG/DL (ref 8.5–10.5)
CHLORIDE SERPL-SCNC: 106 MMOL/L (ref 96–112)
CHOLEST SERPL-MCNC: 131 MG/DL (ref 100–199)
CO2 SERPL-SCNC: 24 MMOL/L (ref 20–33)
CREAT SERPL-MCNC: 0.87 MG/DL (ref 0.5–1.4)
ERYTHROCYTE [DISTWIDTH] IN BLOOD BY AUTOMATED COUNT: 40.5 FL (ref 35.9–50)
EST. AVERAGE GLUCOSE BLD GHB EST-MCNC: 105 MG/DL
GFR SERPLBLD CREATININE-BSD FMLA CKD-EPI: 116 ML/MIN/1.73 M 2
GLOBULIN SER CALC-MCNC: 2.5 G/DL (ref 1.9–3.5)
GLUCOSE SERPL-MCNC: 97 MG/DL (ref 65–99)
HBA1C MFR BLD: 5.3 % (ref 4–5.6)
HCT VFR BLD AUTO: 38.6 % (ref 42–52)
HDLC SERPL-MCNC: 31 MG/DL
HGB BLD-MCNC: 13.6 G/DL (ref 14–18)
LDLC SERPL CALC-MCNC: 62 MG/DL
MAGNESIUM SERPL-MCNC: 1.8 MG/DL (ref 1.5–2.5)
MCH RBC QN AUTO: 30.6 PG (ref 27–33)
MCHC RBC AUTO-ENTMCNC: 35.2 G/DL (ref 32.3–36.5)
MCV RBC AUTO: 86.7 FL (ref 81.4–97.8)
PHOSPHATE SERPL-MCNC: 3 MG/DL (ref 2.5–4.5)
PLATELET # BLD AUTO: 192 K/UL (ref 164–446)
PMV BLD AUTO: 9.9 FL (ref 9–12.9)
POTASSIUM SERPL-SCNC: 4 MMOL/L (ref 3.6–5.5)
PROT SERPL-MCNC: 6.2 G/DL (ref 6–8.2)
RBC # BLD AUTO: 4.45 M/UL (ref 4.7–6.1)
SODIUM SERPL-SCNC: 139 MMOL/L (ref 135–145)
TRIGL SERPL-MCNC: 190 MG/DL (ref 0–149)
WBC # BLD AUTO: 6.8 K/UL (ref 4.8–10.8)

## 2025-05-21 PROCEDURE — 80053 COMPREHEN METABOLIC PANEL: CPT

## 2025-05-21 PROCEDURE — 99232 SBSQ HOSP IP/OBS MODERATE 35: CPT | Performed by: INTERNAL MEDICINE

## 2025-05-21 PROCEDURE — 700102 HCHG RX REV CODE 250 W/ 637 OVERRIDE(OP): Performed by: INTERNAL MEDICINE

## 2025-05-21 PROCEDURE — 85027 COMPLETE CBC AUTOMATED: CPT

## 2025-05-21 PROCEDURE — RXMED WILLOW AMBULATORY MEDICATION CHARGE

## 2025-05-21 PROCEDURE — 83036 HEMOGLOBIN GLYCOSYLATED A1C: CPT

## 2025-05-21 PROCEDURE — 80061 LIPID PANEL: CPT

## 2025-05-21 PROCEDURE — A9270 NON-COVERED ITEM OR SERVICE: HCPCS | Performed by: INTERNAL MEDICINE

## 2025-05-21 PROCEDURE — 93970 EXTREMITY STUDY: CPT

## 2025-05-21 PROCEDURE — 93971 EXTREMITY STUDY: CPT | Mod: RT

## 2025-05-21 PROCEDURE — 83735 ASSAY OF MAGNESIUM: CPT

## 2025-05-21 PROCEDURE — 84100 ASSAY OF PHOSPHORUS: CPT

## 2025-05-21 PROCEDURE — 99239 HOSP IP/OBS DSCHRG MGMT >30: CPT

## 2025-05-21 RX ORDER — METOPROLOL TARTRATE 25 MG/1
25 TABLET, FILM COATED ORAL 2 TIMES DAILY
Qty: 180 TABLET | Refills: 1 | Status: SHIPPED | OUTPATIENT
Start: 2025-05-21

## 2025-05-21 RX ADMIN — METOPROLOL TARTRATE 25 MG: 25 TABLET, FILM COATED ORAL at 05:25

## 2025-05-21 RX ADMIN — ASPIRIN 81 MG: 81 TABLET, COATED ORAL at 05:24

## 2025-05-21 RX ADMIN — CLOPIDOGREL BISULFATE 75 MG: 75 TABLET, FILM COATED ORAL at 05:25

## 2025-05-21 RX ADMIN — EZETIMIBE 10 MG: 10 TABLET ORAL at 05:25

## 2025-05-21 RX ADMIN — APIXABAN 5 MG: 5 TABLET, FILM COATED ORAL at 08:35

## 2025-05-21 ASSESSMENT — FIBROSIS 4 INDEX: FIB4 SCORE: 3.38

## 2025-05-21 NOTE — DISCHARGE SUMMARY
Discharge Summary    CHIEF COMPLAINT ON ADMISSION  No chief complaint on file.      Reason for Admission  STEMI (STEMI)     Admission Date  5/19/2025    CODE STATUS  Prior    HPI & HOSPITAL COURSE    Zeeshan Hewitt is a 34 y.o. male with history of CAD, PCI to LAD for NSTEMI in 2019, NSTEMI in 2021, PFO, protein C deficiency, who presented 5/19/2025 with STEMI from [facility].  He has been taking Plavix and aspirin daily.  At the outside hospital he received IV heparin, Plavix 100 mg, aspirin 325 mL Sandra.  EKG showed inferior STEMI    Patient was taken to the Cath Lab emergently, revealed thrombus in the proximal RCA with suspected paradoxical embolism.  Underwent successful angioplasty and aspiration thrombectomy of the proximal RCA thrombus as well as distal RCA treating embolization.  With residual occlusion of PDA due to embolic material that could not be retrieved.  Found to have severe LV dysfunction 30%, severe LV EDP.    Patient went to IMCU afterwards.  Was on Integrilin.  An echocardiogram was ordered to evaluate for PFO and US DVT for BLE.  Dopplers of the right upper extremity were also ordered as he was noted to have swelling.      Right upper extremity ultrasound showed chronic SVT in the cephalic vein, no DVT appreciated.  Bilateral lower extremity Doppler showed no DVT.  Echo showed preserved LV SF and hypo-/akinesis of the basal and mid inferior and inferolateral wall segments.  There was noted aneurysmal interatrial septum. Patient is to be on Eliquis 5 mg twice daily in addition to Plavix 75 mg daily.  He will be continued on statin and Zetia therapy and metoprolol.  Patient is recommended to follow-up with his primary cardiologist in the next 2 to 4 weeks.  There was a question about possible PFO given noted aneurysmal interatrial septum.  Recommend to discuss with primary cardiologist regarding further evaluation and possible closure.  A referral to oncology has been placed to  establish care regarding his protein C deficiency.    Therefore, he is discharged in fair and stable condition to home with close outpatient follow-up.    The patient met 2-midnight criteria for an inpatient stay at the time of discharge.    Discharge Date  5/21/2025    FOLLOW UP ITEMS POST DISCHARGE  Follow-up with cardiology  Establish care with oncology    DISCHARGE DIAGNOSES  Principal Problem (Resolved):    Acute ST elevation myocardial infarction (STEMI) of inferior wall (HCC) (POA: Unknown)  Active Problems:    PFO with atrial septal aneurysm (Chronic) (POA: Yes)    Systolic dysfunction (POA: Unknown)    Protein C deficiency (HCC) (POA: Unknown)      FOLLOW UP  No future appointments.  [facility]  [address]  [address]  [facility] [address] [phone]  Follow up  Your doctor has referred you for Cardiac Rehab which is important in your recovery. Please call to make an appointment or speak with your local doctor to find a program in your area.    Jaylon Cerna M.D.  [address] [facility] [address]  [phone]    Call      Jaylon Cerna M.D.  [address] [place] [address]  [phone]          [place] [address] [facility] [address]  [phone]  Follow up on 5/28/2025  Please go walk in at 0800 for post MI care      MEDICATIONS ON DISCHARGE     Medication List        START taking these medications        Instructions   Eliquis 5 MG Tabs  Generic drug: apixaban   Take 1 Tablet by mouth 2 times a day.  Dose: 5 mg     metoprolol tartrate 25 MG Tabs  Commonly known as: Lopressor   Take 1 Tablet by mouth 2 times a day.  Dose: 25 mg            CONTINUE taking these medications        Instructions   acetaminophen 500 MG Tabs  Commonly known as: Tylenol   Take 1,000 mg by mouth one time as needed.  Dose: 1,000 mg     atorvastatin 80 MG tablet  Commonly known as: Lipitor   Take 1 tablet by mouth every evening.  Dose: 80 mg     clopidogrel 75 MG  Tabs  Commonly known as: Plavix   Take 1 tablet by mouth every day.  Dose: 75 mg     ezetimibe 10 MG Tabs  Commonly known as: Zetia   Take 1 tablet by mouth every day.  Dose: 10 mg            STOP taking these medications      aspirin 81 MG EC tablet              Allergies  Allergies[1]    DIET  No orders of the defined types were placed in this encounter.      ACTIVITY  As tolerated.  Weight bearing as tolerated    CONSULTATIONS  Cardiology  [facility]CU    PROCEDURES  Cardiac cath    LABORATORY  Lab Results   Component Value Date    SODIUM 139 05/21/2025    POTASSIUM 4.0 05/21/2025    CHLORIDE 106 05/21/2025    CO2 24 05/21/2025    GLUCOSE 97 05/21/2025    BUN 11 05/21/2025    CREATININE 0.87 05/21/2025        Lab Results   Component Value Date    WBC 6.8 05/21/2025    HEMOGLOBIN 13.6 (L) 05/21/2025    HEMATOCRIT 38.6 (L) 05/21/2025    PLATELETCT 192 05/21/2025        Total time of the discharge process exceeds 35 minutes.       [1] No Known Allergies

## 2025-05-21 NOTE — PROGRESS NOTES
Cardiology Follow Up Progress Note    Date of Service  5/21/2025    Attending Physician  JOJO López*    Chief Complaint   Acute inferior ST elevation myocardial infarction    YOUNG Hewitt is a 34 y.o. male admitted 5/19/2025 with known history of coronary artery disease with prior stent placement into the mid LAD in 2019, protein acid deficiency presents with acute inferior ST elevation myocardial infarction    Interim Events  No acute overnight events.  Patient is eager to return home.    Review of Systems  Review of Systems    Vital signs in last 24 hours  Temp:  [36.1 °C (97 °F)-36.8 °C (98.2 °F)] 36.3 °C (97.3 °F)  Pulse:  [72-86] 78  Resp:  [16-18] 16  BP: ()/(57-89) 112/74  SpO2:  [93 %-97 %] 97 %    Physical Exam  Physical Exam  Constitutional:       Appearance: Normal appearance. He is obese.   HENT:      Head: Normocephalic and atraumatic.      Mouth/Throat:      Mouth: Mucous membranes are moist.      Pharynx: Oropharynx is clear.   Eyes:      Extraocular Movements: Extraocular movements intact.      Conjunctiva/sclera: Conjunctivae normal.   Cardiovascular:      Rate and Rhythm: Normal rate and regular rhythm.      Pulses: Normal pulses.      Heart sounds: Normal heart sounds. No murmur heard.     No friction rub. No gallop.   Pulmonary:      Effort: Pulmonary effort is normal.      Breath sounds: Normal breath sounds. No wheezing, rhonchi or rales.   Abdominal:      General: Bowel sounds are normal. There is no distension.      Palpations: Abdomen is soft.   Musculoskeletal:         General: Normal range of motion.      Cervical back: Normal range of motion and neck supple.      Right lower leg: No edema.      Left lower leg: No edema.      Comments: Right arm tender to palpation with noted ecchymosis around the radial access site   Skin:     General: Skin is warm and dry.   Neurological:      General: No focal deficit present.      Mental Status: He is alert and oriented  "to person, place, and time. Mental status is at baseline.   Psychiatric:         Mood and Affect: Mood normal.         Behavior: Behavior normal.         Thought Content: Thought content normal.         Judgment: Judgment normal.         Lab Review  Lab Results   Component Value Date/Time    WBC 6.8 05/21/2025 01:26 AM    RBC 4.45 (L) 05/21/2025 01:26 AM    HEMOGLOBIN 13.6 (L) 05/21/2025 01:26 AM    HEMATOCRIT 38.6 (L) 05/21/2025 01:26 AM    MCV 86.7 05/21/2025 01:26 AM    MCH 30.6 05/21/2025 01:26 AM    MCHC 35.2 05/21/2025 01:26 AM    MPV 9.9 05/21/2025 01:26 AM      Lab Results   Component Value Date/Time    SODIUM 139 05/21/2025 01:26 AM    POTASSIUM 4.0 05/21/2025 01:26 AM    CHLORIDE 106 05/21/2025 01:26 AM    CO2 24 05/21/2025 01:26 AM    GLUCOSE 97 05/21/2025 01:26 AM    BUN 11 05/21/2025 01:26 AM    CREATININE 0.87 05/21/2025 01:26 AM      Lab Results   Component Value Date/Time    ASTSGOT 125 (H) 05/21/2025 01:26 AM    ALTSGPT 43 05/21/2025 01:26 AM     Lab Results   Component Value Date/Time    CHOLSTRLTOT 131 05/21/2025 01:26 AM    LDL 62 05/21/2025 01:26 AM    HDL 31 (A) 05/21/2025 01:26 AM    TRIGLYCERIDE 190 (H) 05/21/2025 01:26 AM    TROPONINT 2093 (H) 04/30/2021 04:22 AM       No results for input(s): \"NTPROBNP\" in the last 72 hours.    Cardiac Imaging and Procedures Review  Cardiac catheterization performed on 5/19/2025 demonstrated thrombotic occlusion of the right coronary artery requiring aspiration thrombectomy with subsequent distal occlusion of the PDA and mid posterolateral system due to shower emboli could not be retrieved.  Elevated left ventricular end-diastolic pressure estimated at 33 mmHg        Assessment/Plan  1.  Acute inferior ST elevation myocardial infarction due to thrombosis  2.  Coronary artery disease with prior stent placement into the LAD  3.  Protein S deficiency  4.  Dyslipidemia    Clinically, he is doing well and remains on appropriate medical therapy following his " acute inferior ST elevation myocardial infarction secondary to coronary thrombus at this time is likely a result of his protein S deficiency and I would recommend that he be on Eliquis therapy 5 mg twice daily in addition to Plavix 75 mg daily.  He will continue ongoing Zetia therapy as well as ongoing metoprolol therapy.  There was question about possible PFO given his noted aneurysmal interatrial septum.  He will work closely with his primary cardiologist in the [facility] for further evaluation and possible closure.  He has preserved LV systolic function on most recent echocardiogram on 5/20/2025.    In terms of other medical conditions I will defer to the primary medicine service      Thank you for allowing me to participate in the care of this patient.  At this time cardiology service will sign off.  Recommended that patient be seen by his primary cardiologist in the next 2 to 4 weeks who is in the [facility]    Please contact me with any questions.    Jay Christopher D.O.   Cardiologist, [facility]  [phone]

## 2025-05-21 NOTE — DISCHARGE INSTRUCTIONS
Please make sure to schedule a follow-up with your cardiologist within the next 1 to 2 months  Please set up an appointment with a hematologist for your protein C deficiency.Diagnosis:  Acute Coronary Syndrome (ACS) is a diagnosis that encompasses cardiac-related chest pain and heart attack. ACS occurs when the blood flow to the heart muscle is severely reduced or cut off completely due to a slow process called atherosclerosis.  Atherosclerosis is a disease in which the coronary arteries become narrow from a buildup of fat, cholesterol, and other substances that combine to form plaque. If the plaque breaks, a blood clot will form and block the blood flow to the heart muscle. This lack of blood flow can cause damage or death to the heart muscle which is called a heart attack or Myocardial Infarction (MI). There are two different types of MIs:  ST Elevation Myocardial Infarction or STEMI (the most severe type of heart attack) and Non-ST Elevation Myocardial Infarction or NSTEMI.    Treatment Plan:  Cardiac Diet  - Low fat, low salt, low cholesterol   Cardiac Rehab  - Your doctor has ordered you a referral to [facility].  Call [phone] to schedule an appointment.  Attend my follow-up appointment with my Cardiologist.  Take my medications as prescribed by my doctor  Exercise daily  Lower my bad cholesterol and raise my good cholesterol, lower my blood pressure, and Reduce stress    Medications:  Certain medications are used to treat ACS.  Remember to always take medications as prescribed and never stop talking medications unless told by your doctor.    You have been prescribed the following medicatons:    Anti-platelet/blood thinner - Your Anti-platelet/Blood thinning medication is called Plavix/Clopidogrel and Apixaban/Eliquis, and is used in combination with aspirin to prevent clots from forming in your heart and/or around your stent.  Your doctor will determine how long you need to be on this  medicine.  Beta-Blocker - Beta-Blocker Metoprolol Tartrate/Lopressor is used to lower blood pressure and heart rate, and/or helps your heart heal after a heart attack.  Statin - Statin Atorvastatin/Lipitor is used to lower cholesterol.

## 2025-05-21 NOTE — PROGRESS NOTES
Bedside report received from off going RN/tech: Laurel, assumed care of patient.     Fall Risk Score: NO RISK  Fall risk interventions in place: Provide patient/family education based on risk assessment, Educate patient/family to call staff for assistance when getting out of bed, Place fall precaution signage outside patient door, and Place patient in room close to nursing station  Bed type: Regular (Javier Score less than 17 interventions in place)  Patient on cardiac monitor: Yes  IVF/IV medications: Infusion per MAR (List Med(s)) Heparin 14 units/kg/hr  Oxygen: Room Air  Bedside sitter: Not Applicable   Isolation: Not applicable

## 2025-05-21 NOTE — PROGRESS NOTES
Bedside report received from off going RN: Sarah assumed care of patient.     Fall Risk Score: NO RISK  Fall risk interventions in place: Provide patient/family education based on risk assessment  Bed type: Regular (Javier Score less than 17 interventions in place)  Patient on cardiac monitor: Yes  IVF/IV medications: Not Applicable   Oxygen: Room Air  Bedside sitter: Not Applicable   Isolation: Not applicable

## 2025-05-21 NOTE — HOSPITAL COURSE
Zeeshan Hewitt is a 34 y.o. male with history of CAD, PCI to LAD for NSTEMI in 2019, NSTEMI in 2021, PFO, protein C deficiency, who presented 5/19/2025 with STEMI from [facility].  He has been taking Plavix and aspirin daily.  At the outside hospital he received IV heparin, Plavix 100 mg, aspirin 325 mL Sandra.  EKG showed inferior STEMI    Patient was taken to the Cath Lab emergently, revealed thrombus in the proximal RCA with suspected paradoxical embolism.  Underwent successful angioplasty and aspiration thrombectomy of the proximal RCA thrombus as well as distal RCA treating embolization.  With residual occlusion of PDA due to embolic material that could not be retrieved.  Found to have severe LV dysfunction 30%, severe LV EDP.    Patient went to [facility] afterwards.  Was on Integrilin.  An echocardiogram was ordered to evaluate for PFO and US DVT for BLE.  Dopplers of the right upper extremity were also ordered as he was noted to have swelling.      Right upper extremity ultrasound showed chronic SVT in the cephalic vein, no DVT appreciated.  Bilateral lower extremity Doppler showed no DVT.  Echo showed preserved LV SF and hypo-/akinesis of the basal and mid inferior and inferolateral wall segments.  There was noted aneurysmal interatrial septum. Patient is to be on Eliquis 5 mg twice daily in addition to Plavix 75 mg daily.  He will be continued on statin and Zetia therapy and metoprolol.  Patient is recommended to follow-up with his primary cardiologist in the next 2 to 4 weeks.  There was a question about possible PFO given noted aneurysmal interatrial septum.  Recommend to discuss with primary cardiologist regarding further evaluation and possible closure.  A referral to oncology has been placed to establish care regarding his protein C deficiency.

## 2025-05-21 NOTE — CARE PLAN
The patient is Stable - Low risk of patient condition declining or worsening    Shift Goals  Clinical Goals: VSS, heparin gtt, US LUE  Patient Goals: go home  Family Goals: travis    Progress made toward(s) clinical / shift goals:    Problem: Knowledge Deficit - Standard  Goal: Patient and family/care givers will demonstrate understanding of plan of care, disease process/condition, diagnostic tests and medications  Description: Target End Date:  1-3 days or as soon as patient condition allowsDocument in Patient Education1.  Patient and family/caregiver oriented to unit, equipment, visitation policy and means for communicating concern2.  Complete/review Learning Assessment3.  Assess knowledge level of disease process/condition, treatment plan, diagnostic tests and medications4.  Explain disease process/condition, treatment plan, diagnostic tests and medications  Outcome: Progressing     Problem: Pain - Standard  Goal: Alleviation of pain or a reduction in pain to the patient’s comfort goal  Description: Target End Date:  Prior to discharge or change in level of careDocument on Vitals flowsheet1.  Document pain using the appropriate pain scale per order or unit policy2.  Educate and implement non-pharmacologic comfort measures (i.e. relaxation, distraction, massage, cold/heat therapy, etc.)3.  Pain management medications as ordered4.  Reassess pain after pain med administration per policy5.  If opiods administered assess patient's response to pain medication is appropriate per POSS sedation scale6.  Follow pain management plan developed in collaboration with patient and interdisciplinary team (including palliative care or pain specialists if applicable)  Outcome: Progressing     Problem: Optimal Care for the Acute Coronary Syndrome Patient  Goal: Optimal Care for the Acute Coronary Syndrome Patient  Description: Target End Date:  1 to 3 days  Outcome: Progressing  Goal: Potential Interventions for the Acute Coronary  Syndrome Patient  Description: Target End Date:  1 to 3 days  Outcome: Progressing     Problem: Optimal Care for the STEMI Patient  Goal: Optimal Care for the STEMI Patient  Description: Target End Date:  1 to 3 days  Outcome: Progressing     Problem: Optimal Care for the Cath Lab Patient  Goal: Optimal Care for the Cath Lab Patient  Description: Target End Date:  1 to 3 days  Outcome: Progressing     Problem: Discharge Care for the Acute Coronary Syndrome Patient  Goal: Patient will be discharged on guideline directed medical therapy  Description: Target End Date:  1 to 3 days  Outcome: Progressing     Problem: Respiratory  Goal: Patient will achieve/maintain optimum respiratory ventilation and gas exchange  Description: Target End Date:  Prior to discharge or change in level of careDocument on Assessment flowsheet1.  Assess and monitor rate, rhythm, depth and effort of respiration2.  Breath sounds assessed qshift and/or as needed3.  Assess O2 saturation, administer/titrate oxygen as ordered4.  Position patient for maximum ventilatory efficiency5.  Turn, cough, and deep breath with splinting to improve effectiveness6.  Collaborate with RT to administer medication/treatments per order7.  Encourage use of incentive spirometer and encourage patient to cough after use and utilize splinting techniques if applicable8.  Airway suctioning9.  Monitor sputum production for changes in color, consistency and hlnrqewdl57. Perform frequent oral piwoaaz28. Alternate physical activity with rest periods  Outcome: Progressing     Problem: Hemodynamics  Goal: Patient's hemodynamics, fluid balance and neurologic status will be stable or improve  Description: Target End Date:  Prior to discharge or change in level of careDocument on Assessment and I/O flowsheet templates1.  Monitor vital signs, pulse oximetry and cardiac monitor per provider order and/or policy2.  Maintain blood pressure per provider order3.  Hemodynamic monitoring per  provider order4.  Manage IV fluids and IV infusions5.  Monitor intake and output6.  Daily weights per unit policy or provider order7.  Assess peripheral pulses and capillary refill8.  Assess color and body temperature9.  Position patient for maximum circulation/cardiac ulpdqh59. Monitor for signs/symptoms of excessive soglivzt25. Assess mental status, restlessness and changes in level of xktgfduirelgj76. Monitor temperature and report fever or hypothermia to provider immediately. Consideration of targeted temperature management.  Outcome: Progressing     Problem: Stemi  Goal: Optimal Care of the Stemi Patient  Outcome: Progressing

## 2025-05-23 NOTE — Clinical Note
REFERRAL APPROVAL NOTICE         Sent on May 23, 2025                   Zeeshan Hewitt  [address] [facility] [address]                   Dear Mr. Hewitt,    After a careful review of the medical information and benefit coverage, [facility] has processed your referral. See below for additional details.    If applicable, you must be actively enrolled with your insurance for coverage of the authorized service. If you have any questions regarding your coverage, please contact your insurance directly.    REFERRAL INFORMATION   Referral #:  [id number]  Referred-To Department    Referred-By Provider:  Cardiac Intensive Care    Rogerio Petty M.D.   Intensive Card Rehab      [address]  [address]  [facility] [address]  [phone] [address]  [address]  [place] [address] [phone]    Referral Start Date:  05/19/2025  Referral End Date:   05/20/2026             SCHEDULING  If you do not already have an appointment, please call [phone] to make an appointment.     MORE INFORMATION  If you do not already have a [employer] account, sign up at: [email or web address]  You can access your medical information, make appointments, see lab results, billing information, and more.  If you have questions regarding this referral, please contact  the [facility] Referrals department at:             [phone]. Monday - Friday 8:00AM - 5:00PM.     Sincerely,    [facility]

## 2025-05-27 NOTE — Clinical Note
REFERRAL APPROVAL NOTICE         Sent on May 27, 2025                   Zeeshan Hewitt  [address] [facility] [address] [facility] [address]                   Dear Mr. Hewitt,    After a careful review of the medical information and benefit coverage, [facility] has processed your referral. See below for additional details.    If applicable, you must be actively enrolled with your insurance for coverage of the authorized service. If you have any questions regarding your coverage, please contact your insurance directly.    REFERRAL INFORMATION   Referral #:  [id number]  Referred-To Provider    Referred-By Provider:  Cardiovascular Disease (Cardiology)    YANIV Tinsley WALTER      [address] [facility] [address]  [phone] [address] [facility] [id number] [phone]    Referral Start Date:  05/21/2025  Referral End Date:   05/21/2026             SCHEDULING  If you do not already have an appointment, please call [phone] to make an appointment.     MORE INFORMATION  If you do not already have a [employer] account, sign up at: [email or web address]  You can access your medical information, make appointments, see lab results, billing information, and more.  If you have questions regarding this referral, please contact  the [facility] Referrals department at:             [phone]. Monday - Friday 8:00AM - 5:00PM.     Sincerely,    [facility]